# Patient Record
Sex: MALE | Race: WHITE
[De-identification: names, ages, dates, MRNs, and addresses within clinical notes are randomized per-mention and may not be internally consistent; named-entity substitution may affect disease eponyms.]

---

## 2017-12-27 ENCOUNTER — HOSPITAL ENCOUNTER (INPATIENT)
Dept: HOSPITAL 74 - JER | Age: 82
LOS: 26 days | Discharge: HOSPICE | DRG: 871 | End: 2018-01-22
Attending: INTERNAL MEDICINE | Admitting: INTERNAL MEDICINE
Payer: COMMERCIAL

## 2017-12-27 VITALS — BODY MASS INDEX: 28.2 KG/M2

## 2017-12-27 DIAGNOSIS — M54.9: ICD-10-CM

## 2017-12-27 DIAGNOSIS — J96.22: ICD-10-CM

## 2017-12-27 DIAGNOSIS — D72.829: ICD-10-CM

## 2017-12-27 DIAGNOSIS — E87.6: ICD-10-CM

## 2017-12-27 DIAGNOSIS — E83.39: ICD-10-CM

## 2017-12-27 DIAGNOSIS — J44.1: ICD-10-CM

## 2017-12-27 DIAGNOSIS — K27.9: ICD-10-CM

## 2017-12-27 DIAGNOSIS — R13.10: ICD-10-CM

## 2017-12-27 DIAGNOSIS — J47.1: ICD-10-CM

## 2017-12-27 DIAGNOSIS — E87.70: ICD-10-CM

## 2017-12-27 DIAGNOSIS — R10.9: ICD-10-CM

## 2017-12-27 DIAGNOSIS — I10: ICD-10-CM

## 2017-12-27 DIAGNOSIS — J96.21: ICD-10-CM

## 2017-12-27 DIAGNOSIS — A41.9: Primary | ICD-10-CM

## 2017-12-27 DIAGNOSIS — J69.0: ICD-10-CM

## 2017-12-27 LAB
ALBUMIN SERPL-MCNC: 2.6 G/DL (ref 3.4–5)
ALP SERPL-CCNC: 96 U/L (ref 45–117)
ALT SERPL-CCNC: 12 U/L (ref 12–78)
ANION GAP SERPL CALC-SCNC: 7 MMOL/L (ref 8–16)
APPEARANCE UR: (no result)
APTT BLD: 40.1 SECONDS (ref 26.9–34.4)
ARTERIAL BLOOD GAS PCO2: 55.6 MMHG (ref 35–45)
ARTERIAL PATENCY WRIST A: (no result)
AST SERPL-CCNC: 14 U/L (ref 15–37)
BACTERIA #/AREA URNS HPF: (no result) /HPF
BASE EXCESS BLDA CALC-SCNC: 7.5 MEQ/L (ref -2–2)
BASOPHILS # BLD: 0.1 % (ref 0–2)
BILIRUB SERPL-MCNC: 0.4 MG/DL (ref 0.2–1)
BILIRUB UR STRIP.AUTO-MCNC: NEGATIVE MG/DL
BUN SERPL-MCNC: 13 MG/DL (ref 7–18)
CALCIUM SERPL-MCNC: 8.8 MG/DL (ref 8.5–10.1)
CHLORIDE SERPL-SCNC: 100 MMOL/L (ref 98–107)
CO2 SERPL-SCNC: 34 MMOL/L (ref 21–32)
COHGB MFR BLD: 1.4 GM% (ref 0.5–2)
COLOR UR: YELLOW
CREAT SERPL-MCNC: 0.4 MG/DL (ref 0.7–1.3)
DEPRECATED RDW RBC AUTO: 16.5 % (ref 11.9–15.9)
EOSINOPHIL # BLD: 0 % (ref 0–4.5)
EPITH CASTS URNS QL MICRO: (no result) /HPF
GLUCOSE SERPL-MCNC: 129 MG/DL (ref 74–106)
HCT VFR BLD CALC: 39.6 % (ref 35.4–49)
HGB BLD-MCNC: 11.7 GM/DL (ref 11.7–16.9)
INR BLD: 1.12 (ref 0.82–1.09)
KETONES UR QL STRIP: NEGATIVE
LEUKOCYTE ESTERASE UR QL STRIP.AUTO: NEGATIVE
LYMPHOCYTES # BLD: 5.7 % (ref 8–40)
MCH RBC QN AUTO: 28.3 PG (ref 25.7–33.7)
MCHC RBC AUTO-ENTMCNC: 29.5 G/DL (ref 32–35.9)
MCV RBC: 95.8 FL (ref 80–96)
MONOCYTES # BLD AUTO: 9.2 % (ref 3.8–10.2)
MUCOUS THREADS URNS QL MICRO: (no result)
NEUTROPHILS # BLD: 85 % (ref 42.8–82.8)
NITRITE UR QL STRIP: NEGATIVE
PH UR: 5 [PH] (ref 5–8)
PLATELET BLD QL SMEAR: (no result)
PMV BLD: 8.5 FL (ref 7.5–11.1)
PO2 BLDA: 63.3 MMHG (ref 68–100)
POTASSIUM SERPLBLD-SCNC: 3.6 MMOL/L (ref 3.5–5.1)
PROT SERPL-MCNC: 6.5 G/DL (ref 6.4–8.2)
PROT UR QL STRIP: (no result)
PROT UR QL STRIP: NEGATIVE
PT PNL PPP: 12.7 SEC (ref 9.98–11.88)
RBC # BLD AUTO: 4.13 M/MM3 (ref 4–5.6)
RBC # UR STRIP: NEGATIVE /UL
SAO2 % BLDA: 87.5 % (ref 90–98.9)
SODIUM SERPL-SCNC: 141 MMOL/L (ref 136–145)
SP GR UR: 1.03 (ref 1–1.03)
UROBILINOGEN UR STRIP-MCNC: NEGATIVE MG/DL (ref 0.2–1)
WBC # BLD AUTO: 12.8 K/MM3 (ref 4–10)

## 2017-12-27 PROCEDURE — G0480 DRUG TEST DEF 1-7 CLASSES: HCPCS

## 2017-12-27 RX ADMIN — METHYLPREDNISOLONE SODIUM SUCCINATE SCH MG: 40 INJECTION, POWDER, FOR SOLUTION INTRAMUSCULAR; INTRAVENOUS at 14:12

## 2017-12-27 RX ADMIN — IPRATROPIUM BROMIDE AND ALBUTEROL SULFATE SCH AMP: .5; 3 SOLUTION RESPIRATORY (INHALATION) at 17:53

## 2017-12-27 RX ADMIN — MUPIROCIN SCH APPLIC: 20 OINTMENT TOPICAL at 21:17

## 2017-12-27 RX ADMIN — PANTOPRAZOLE SODIUM SCH MG: 40 INJECTION, POWDER, FOR SOLUTION INTRAVENOUS at 15:56

## 2017-12-27 RX ADMIN — PIPERACILLIN SODIUM,TAZOBACTAM SODIUM SCH MLS/HR: 3; .375 INJECTION, POWDER, FOR SOLUTION INTRAVENOUS at 17:31

## 2017-12-27 RX ADMIN — HEPARIN SODIUM SCH UNIT: 5000 INJECTION, SOLUTION INTRAVENOUS; SUBCUTANEOUS at 15:56

## 2017-12-27 RX ADMIN — VANCOMYCIN HYDROCHLORIDE SCH MLS/HR: 1 INJECTION, POWDER, LYOPHILIZED, FOR SOLUTION INTRAVENOUS at 21:17

## 2017-12-27 RX ADMIN — HEPARIN SODIUM SCH UNIT: 5000 INJECTION, SOLUTION INTRAVENOUS; SUBCUTANEOUS at 21:17

## 2017-12-27 NOTE — CONSULT
Consult


Consult Specialty:: Pulmonary Critical Care 





- History of Present Illness


Chief Complaint: SOB


History of Present Illness: 





Pt is a 87 yo male with h/o COPD, HTN, diverticulosis, who was recently 

hospitalized (11/14-12/6) for GIB 2/2 duodenal ulcer with course at the time c/

b PNA. He has completed a course of Levaquin and was discharged to NH. Today 

EMS activated for SOB x 2 days,  and pt was brought to ED for evaluation. 

Admission labs notable for WBC 12, Trop 0.18. EKG with no ischemic changes. Pt 

placed on BiPAP with improvement in his respiratory status and SpO2. Pt was 

started on nebs, methylpred, zosyn/vanco and was transferred to the ICU for 

further management. On arrival to the ICU BiPAP changed to HFNC. 








 Current Medications





Acetaminophen (Ofirmev Injection -)  1,000 mg IVPB Q6H PRN


   PRN Reason: FEVER OR PAIN


Albuterol Sulfate (Ventolin 0.083% Nebulizer Soln -)  1 amp NEB Q4H PRN


   PRN Reason: SHORT OF BREATH/WHEEZING


Albuterol/Ipratropium (Duoneb -)  1 amp NEB QIDR [facility]


   Last Admin: 12/27/17 17:53 Dose:  1 amp


Chlorhexidine Gluconate (Hibiclens For Decolonization -)  1 applic TP HS [facility]


Heparin Sodium (Porcine) (Heparin -)  5,000 unit SQ TID [facility]


   Last Admin: 12/27/17 15:56 Dose:  5,000 unit


Dextrose/Sodium Chloride (D5-Ns -)  1,000 mls @ 75 mls/hr IV ASDIR [facility]


   Last Admin: 12/27/17 15:52 Dose:  75 mls/hr


Vancomycin HCl 1,000 mg/ (Dextrose)  250 mls @ 250 mls/hr IVPB BID [facility]


   PRN Reason: Protocol


Piperacillin Sod/Tazobactam (Sod 3.375 gm/ Dextrose)  100 mls @ 200 mls/hr IVPB 

Q8H-IV [facility]


   Last Admin: 12/27/17 17:31 Dose:  200 mls/hr


Methylprednisolone Sodium Succinate (Solu-Medrol -)  40 mg IVPUSH DAILY [facility]


   Last Admin: 12/27/17 14:12 Dose:  40 mg


Mupirocin (Bactroban Ointment (For Decolonization) -)  1 applic NS BID [facility]


   Stop: 01/01/18 21:59


Pantoprazole Sodium (Protonix Iv)  40 mg IVPUSH DAILY [facility]


   Last Admin: 12/27/17 15:56 Dose:  40 mg





 





- Past Medical History


CNS: No: Alzheimer's


Cardio/Vascular: No: AFIB


Pulmonary: Yes: COPD, O2 Dependent, Pneumonia


Gastrointestinal: Yes: Diverticulosis, GI Bleed, Peptic Ulcer Disease, Other (

GASTRIC OUTLET OBSTRUCTION)


Hepatobiliary: No: Cirrhosis


Renal/: No: Renal Failure


Infectious Disease: No: AIDS


Psych: No: Addictions





- Past Surgical History


Additional Surgical History: UNABLE TO OBTAIN ACCURATE INFO/BACK SURG MULTIPLE





- Alcohol/Substance Use


Hx Alcohol Use: No


History of Substance Use: reports: None





- Smoking History


Smoking history: Unknown if ever smoked





- Social History


Usual Living Arrangement: Nursing Home


ADL: Support Services


History of Recent Travel: No





Home Medications





- Allergies


Allergies/Adverse Reactions: 


 Allergies











Allergy/AdvReac Type Severity Reaction Status Date / Time


 


No Known Allergies Allergy   Verified 12/27/17 08:24














- Home Medications


Home Medications: 


Ambulatory Orders





Acetaminophen [Tylenol] 2 tab PO Q6H PRN 12/27/17 


Albuterol 2.5/Ipratropium 0.5 [Duoneb -] 1 amp NEB TID 12/27/17 


Budesonide [Pulmicort 0.5 mg Nebulizer -] 1 neb PO BID 12/27/17 


Ipratropium 0.02% Nebulizer [Atrovent 0.02% Nebulizer -] 1 amp NEB Q6H PRN 12/27 /17 


Menthol/Phenol [Cepastat Lozenge -] 1 each MM QID PRN 12/27/17 


Metoclopramide HCl 10 mg PO TID 12/27/17 


Prednisone 10 mg PO BID 12/27/17 


Tramadol HCl [Ultram] 2 mg PO DAILY 12/27/17 











Physical Exam


Vital Signs: 


 Vital Signs











Temperature  97.5 F L  12/27/17 16:00


 


Pulse Rate  75   12/27/17 16:40


 


Respiratory Rate  19   12/27/17 19:28


 


Blood Pressure  112/61   12/27/17 16:40


 


O2 Sat by Pulse Oximetry (%)  93 L  12/27/17 19:28











Constitutional: Yes: No Distress


Cardiovascular: Yes: Regular Rate and Rhythm, S1, S2


Respiratory: Yes: Regular, On Nasal O2, Other (crackles at bases)


Gastrointestinal: Yes: Normal Bowel Sounds, Soft.  No: Tenderness


Edema: No


Labs: 


 CBC, BMP





 12/27/17 09:15 





 12/27/17 09:15 





 CBCD











WBC  12.8 K/mm3 (4.0-10.0)  H  12/27/17  09:15    


 


RBC  4.13 M/mm3 (4.00-5.60)   12/27/17  09:15    


 


Hgb  11.7 GM/dL (11.7-16.9)   12/27/17  09:15    


 


Hct  39.6 % (35.4-49)   12/27/17  09:15    


 


MCV  95.8 fl (80-96)   12/27/17  09:15    


 


MCHC  29.5 g/dl (32.0-35.9)  L  12/27/17  09:15    


 


RDW  16.5 % (11.9-15.9)  H  12/27/17  09:15    


 


Plt Count  No Result Required.   12/27/17  09:15    


 


MPV  8.5 fl (7.5-11.1)   12/27/17  09:15    








 CMP











Sodium  141 mmol/L (136-145)   12/27/17  09:15    


 


Potassium  3.6 mmol/L (3.5-5.1)   12/27/17  09:15    


 


Chloride  100 mmol/L ()   12/27/17  09:15    


 


Carbon Dioxide  34 mmol/L (21-32)  H  12/27/17  09:15    


 


Anion Gap  7  (8-16)  L  12/27/17  09:15    


 


BUN  13 mg/dL (7-18)   12/27/17  09:15    


 


Creatinine  0.4 mg/dL (0.7-1.3)  L  12/27/17  09:15    


 


Creat Clearance w eGFR  > 60  (>60)   12/27/17  09:15    


 


Calcium  8.8 mg/dL (8.5-10.1)   12/27/17  09:15    


 


Total Bilirubin  0.4 mg/dL (0.2-1.0)   12/27/17  09:15    


 


AST  14 U/L (15-37)  L  12/27/17  09:15    


 


ALT  12 U/L (12-78)   12/27/17  09:15    


 


Alkaline Phosphatase  96 U/L ()   12/27/17  09:15    


 


Total Protein  6.5 g/dl (6.4-8.2)   12/27/17  09:15    


 


Albumin  2.6 g/dl (3.4-5.0)  L  12/27/17  09:15    














Imaging





- Results


Chest X-ray: Report Reviewed





Problem List





- Problems


(1) COPD (chronic obstructive pulmonary disease)


Code(s): J44.9 - CHRONIC OBSTRUCTIVE PULMONARY DISEASE, UNSPECIFIED   


Qualifiers: 


   COPD type: COPD with acute lower respiratory infection   Qualified Code(s): 

J44.0 - Chronic obstructive pulmonary disease with acute lower respiratory 

infection   





(2) Hypertension


Code(s): I10 - ESSENTIAL (PRIMARY) HYPERTENSION   





(3) Pneumonia


Code(s): J18.9 - PNEUMONIA, UNSPECIFIED ORGANISM   


Qualifiers: 


   Pneumonia type: due to unspecified organism   Laterality: left   Lung 

location: lower lobe of lung   Qualified Code(s): J18.1 - Lobar pneumonia, 

unspecified organism   





(4) Respiratory failure


Code(s): J96.90 - RESPIRATORY FAILURE, UNSP, UNSP W HYPOXIA OR HYPERCAPNIA   





Assessment/Plan





Hypoxemic respiratory failure, PNA (bacterial or viral) vs fluid overload vs 

COPD exacerbation 


Troponin leak likely demand ischemia 


HTN


Recent GIB, now with stable Hgb





-f/u respiratory swab


-send sputum if able


-cont Zosyn/Vanco


-cont nebs


-cont methylpred 


-cont HFNC --> wean flow and FiO2 for SpO2 88-94% (currently at 50%/60L)


-TTE 


-stop standing fluids


-consider diuresis if depressed EF 


-trend troponin 


-not hypertensive now, monitor 


-LE dopplers


-heparin SubQ for ppx 


-protonix for GI ppx (recent GIB)


-DNR/DNI/no invasive procedures as per prior GOC conversations





LUIS FELIPE Christianson 


Critical Care time: 35 min

## 2017-12-27 NOTE — CON.PULM
Consult


Consult Specialty:: PULMONARY


Referred by:: DONTE


Reason for Consultation:: RESP FAILURE





- History of Present Illness


Chief Complaint: SOB/COUGH/FEVER


History of Present Illness: 





86 W MALE ADMITTED FROM [facility] FOUND TO BE SOB/WITH O2 DESAT, BIBA.


NOW WITH NIPPV AND IS BEING SEEN IN ER  PT HAS H/O RECENT [facility] ADMISSION FOR 

ACUTE DIVERTICULITIS/PUD WITH HEMORRHAGE


AND POSSIBLE PNEUMONIA. THESE EVENTS ARE OBTAINED FROM THE CHART. 


PATIENT HAS H/O COPD ON O2 AT NIGHT/OA/B/L ILIAC ART ANEURYSMS.





- History Source


History Provided By: Family Member, Medical Record


Limitations to Obtaining History: Clinical Condition





- Past Medical History


CNS: No: Alzheimer's


Cardio/Vascular: No: AFIB


Pulmonary: Yes: COPD, O2 Dependent, Pneumonia


Gastrointestinal: Yes: Diverticulosis, GI Bleed, Peptic Ulcer Disease, Other (

GASTRIC OUTLET OBSTRUCTION)


Hepatobiliary: No: Cirrhosis


Renal/: No: Renal Failure


Heme/Onc: Yes: Anemia


Infectious Disease: No: AIDS


Psych: No: Addictions





- Past Surgical History


Additional Surgical History: UNABLE TO OBTAIN ACCURATE INFO/BACK SURG MULTIPLE





- Alcohol/Substance Use


Hx Alcohol Use: No


History of Substance Use: reports: None





- Smoking History


Smoking history: Unknown if ever smoked





- Social History


Usual Living Arrangement: Nursing Home


ADL: Support Services


Place of Birth: [place]


History of Recent Travel: No





Home Medications





- Allergies


Allergies/Adverse Reactions: 


 Allergies











Allergy/AdvReac Type Severity Reaction Status Date / Time


 


No Known Allergies Allergy   Verified 12/27/17 08:24














- Home Medications


Home Medications: 


Ambulatory Orders





Albuterol 2.5/Ipratropium 0.5 [Duoneb -] 1 amp NEB TID 12/27/17 


Budesonide [Pulmicort Flexhaler] 90 mcg IH BID 12/27/17 


Ipratropium 0.02% Nebulizer [Atrovent 0.02% Nebulizer -] 1 amp NEB Q6H PRN 12/27 /17 


Metoclopramide HCl 10 mg PO TID 12/27/17 


Prednisone 10 mg PO BID 12/27/17 


Tramadol HCl [Ultram] 50 mg PO DAILY 12/27/17 











Family Disease History





- Family Disease History


Family History: Unable to Obtain





Review of Systems


Unable to obtain ROS, reason: UNABLE





Physical Exam


Vital Sings: 


 Vital Signs











Temperature  99 F   12/27/17 14:13


 


Pulse Rate  96 H  12/27/17 12:59


 


Respiratory Rate  24   12/27/17 12:59


 


Blood Pressure  125/78   12/27/17 12:59


 


O2 Sat by Pulse Oximetry (%)  86 L  12/27/17 12:59











Constitutional: Yes: Moderate Distress


Eyes: Yes: EOM Intact


HENT: Yes: Normocephalic


Neck: Yes: Other (NIPPV MASK)


Cardiovascular: Yes: Tachycardia, S1, S2


Respiratory: Yes: Diminished, Rhonchi (SCATTERED B/L)


Gastrointestinal: Yes: Normal Bowel Sounds, Soft


Renal/: Yes: WNL


Extremities: Yes: Other (HEEL OFFLOADERS)


Labs: 


 CBC, BMP





 12/27/17 09:15 





 12/27/17 09:15 





 ABG Results











ABG pH  7.40  (7.35-7.45)   12/27/17  10:25    


 


ABG pCO2 at Pt Temp  55.6 mmHg (35-45)  H  12/27/17  10:25    


 


ABG pO2 at Pt Temp  63.3 mmHg ()  L  12/27/17  10:25    


 


ABG HCO3  33.4 meq/L (22-26)  H  12/27/17  10:25    


 


ABG O2 Sat (Measured)  87.5 % (90-98.9)  L  12/27/17  10:25    


 


ABG O2 Content  13.2 % vol (15-22)  L  12/27/17  10:25    


 


ABG Base Excess  7.5 meq/l (-2-2)  H  12/27/17  10:25    








REST REVIEWED





Imaging





- Results


Chest X-ray: Report Reviewed, Image Reviewed


X-ray: Report Reviewed, Image Reviewed





Problem List





- Problems


(1) COPD (chronic obstructive pulmonary disease)


Code(s): J44.9 - CHRONIC OBSTRUCTIVE PULMONARY DISEASE, UNSPECIFIED   


Qualifiers: 


   COPD type: COPD with acute lower respiratory infection   Qualified Code(s): 

J44.0 - Chronic obstructive pulmonary disease with acute lower respiratory 

infection   





(2) Pneumonia


Code(s): J18.9 - PNEUMONIA, UNSPECIFIED ORGANISM   


Qualifiers: 


   Pneumonia type: due to unspecified organism   Laterality: left   Lung 

location: lower lobe of lung   Qualified Code(s): J18.1 - Lobar pneumonia, 

unspecified organism   





(3) Anemia


Code(s): D64.9 - ANEMIA, UNSPECIFIED   





(4) Sepsis


Code(s): A41.9 - SEPSIS, UNSPECIFIED ORGANISM   


Qualifiers: 


   Sepsis type: sepsis due to unspecified organism   Qualified Code(s): A41.9 - 

Sepsis, unspecified organism   





(5) Hypertension


Code(s): I10 - ESSENTIAL (PRIMARY) HYPERTENSION   





(6) Chronic steroid use


Code(s): COA5812 -    





Assessment/Plan





HYPOXEMIC HYPERCAPNEIC RESP FAILURE LIKELY MULTI-FACTORIAL


R/O PNEUMONIA (NO PREVIOUS XRAYS FOR COMPARISON)


MULTIPLE CO-MORBID CONDITIONS AS LISTED IN HX


FAILURE TO THRIVE





WOULD OBTAIN RECORDS FROM RECENT ADMISSION TO [facility] AS NOT TO 

REPEAT TESTING


PANCULTURE/BLOOD/SPUTUM/URINE


CONTINUE NIPPV WITH O2 TO KEEP SAT GREATER THAN 90%


EMPIRIC ANTIBIOTICS TO COVER FACILITY ACQUIRED PATHOGENS


INFLU SWAB/URINARY ANTIGENS


CYCLE TROPONINS/SERIAL EKG'S/TELEMETRY MONITORING/CARDIAC CONSULT


CHECK ECHO/BNP


FOLLOW CXR





BRIAN TRAN MD

## 2017-12-27 NOTE — PDOC
Attending Attestation





- HPI


HPI: 








12/27/17 10:41


The patient is a 86 year old male, with a significant past medical history of 

COPD, pneumonia (bilaterally, with recent hospitalization at [facility] 2 weeks ago, 

treated with Levaquin), hypertension, diverticulosis, and osteoarthritis, who 

presents to the emergency department [facility] from [facility] with shortness of 

breath for approximately 2 days. Per EMS patients O2 sat was found to be in the 

low 30s earlier this morning. EMS reports patient was given a Duoneb treatment 

with minimal relief of symptoms. However, patients O2 sat went up to 88, which 

is part of his baseline. Patient reports associated diaphoresis and 

lightheadedness, but denies any chest pain or palpitations. He denies any 

recent fever, chills, or headache. He denies any abdominal pain, nausea, or 

vomiting. Patients history is limited, as he only responds to yes or no 

questions due to CPap in place.





Allergies: NKDA


Past Surgical History: None reported








- Physicial Exam


PE: 





12/27/17 10:41


GENERAL: The patient is awake, alert, and fully oriented, Nontoxic - in no 

acute distress.


HEAD: Normocephalic, atraumatic.


EYES: extraocular movements intact, sclera anicteric, conjunctiva clear.


ENT: + Mildly dry mucous membranes. Normal voice.


NECK: Normal range of motion, supple


LUNGS: Breath sounds equal, clear to auscultation bilaterally. No wheezes, no 

rhonchi, no rales.


HEART: Regular rate and rhythm, without murmur, rub or gallop.


ABDOMEN: Soft, nontender, normoactive bowel sounds. No guarding, no rebound.No 

CVA tenderness


EXTREMITIES: Normal range of motion, no edema. No clubbing or cyanosis. No cords

, erythema, or tenderness.


NEUROLOGICAL: No facial asymmetry, Normal speech,


PSYCH: Normal mood, normal affect.


SKIN: Warm, Dry, normal turgor.


Back: No midline tenderness to the cervical, thoracic or lumbar spine


Musculoskelatal: FROM of b/l shoulders, elbows, wrist. FROM of hips, knees, 

ankles - No signs of ecchymosis, erythema, or crepitus noted on palpation 

extremities, chest wall, clavicals, ribs, back.





- Medical Decision Making





12/27/17 10:41








Documentation prepared by Zena Bowden, acting as medical scribe for Omid Miller MD.





<Zena Bowden - Last Filed: 12/27/17 10:41>





- Resident


Resident Name: Vlad Camara





- ED Attending Attestation


I have performed the following: I have examined & evaluated the patient, The 

case was reviewed & discussed with the resident, I agree w/resident's findings 

& plan, Exceptions are as noted





- HPI


HPI: 





12/27/17 10:24


86y M hx of anxiety, osteo, htn, copd on3 l of o2, recent admission for b/l pna 

in early dec sent from [facility] on [address] for evaluation of sob, pt notes he was 

feeling ok yesterday, this morning started feeling sob. pt notes he felt sweaty 

yetserday, but denies any fever/chills, cp, n/v, palptitations, abd pain, 

diarrhea.  


o2 sat in the 80s per EMS, started on cpap in the field with mild improvement





on arrival the pt was started on bipap


noted hypoxic to 80 on RA, improvement with bipap to 90s


with improvement of respiratory effort.


pts pulm exam revealed deiminshed breath sounds at the bases





differential ncludes pna, chf, copd exacerbation


sepsis orderset initiated


continue bipap


anticipate admission for further managment











- Medical Decision Making








12/27/17 11:39





CRITICAL CARE DOCUMENTATION:





I spent ~35 minutes of Critical Care time, excluding separately billable 

procedures, involving high complexity decision making to assess, manipulate and 

support vital system function(s) to treat single or multiple vital organ system 

failure and/or to prevent further life threatening deterioration of the patient'

s condition.





12/27/17 11:51


case dw Dr. Ryder - agree with admission under dr. gupta service





Case discussed in detail with admitting physician including history, physical 

exam and ancillary studies.


Admitting physician has assumed care for the patient, will follow all pending 

diagnostics and will complete the evaluation and treatment. 








<Omid Miller - Last Filed: 12/27/17 11:51>





**Heart Score/ECG Review





- ECG Impressions


Comment:: 





12/27/17 10:35


Twelve-lead EKG was performed and reviewed by me. 


There is normal sinus rhythm 


Right bundle-branch block 


No ST changes suggestive of acute ischemia 








<Omid Miller - Last Filed: 12/27/17 11:51>

## 2017-12-27 NOTE — CONS
INFECTIOUS DISEASE CONSULTATION

 

DATE OF CONSULTATION:  12/27/2017

 

REQUESTING PROVIDER:  The hospitalist service.

 

HISTORY OF PRESENT ILLNESS:  This is an 86-year-old man who was admitted for rehab

following a hospitalization at [facility] from November 14 to December 6.  He had more diverticulosis, a GI bleed, was found to have, on

endoscopy, 3 ulcers in his duodenum.  He was found as well to have bilateral

pneumonia and was treated with a course of Levaquin, apparently which he finished on

the 20th of December.  This morning, he was noted to have respiratory distress.  He

was given 2 nebulizers at the nursing home with no improvement.  He was hypoxic, he

had a fever of 101.1, and he was transferred to the hospital.  He was given

vancomycin and Zosyn in the ER.  His respiratory status improved in the emergency

room.  He is DNR/DNI.  He is currently in the ICU on high-flow oxygen.

 

PAST MEDICAL HISTORY:  He has adjustment disorder.  He has difficulty walking.  He

has primary osteoarthritis.  He has a history of pneumonia; pressure ulcer of the

right hip, stage 2; pressure ulcer of the left hip, stage 2; COPD; rhabdomyolysis;

hypertension; BPH; and he has a history of dysphagia, hypoxemia.

 

ALLERGIES:  He has no known drug allergies.

 

MEDICATIONS:  At the nursing home include Tylenol, Reglan, prednisone, Pulmicort,

Cepastat throat lozenges, DuoNebs, and tramadol.

 

SURGICAL HISTORY:  Not available.

 

SOCIAL HISTORY:  As well is not available.  He is currently residing at the nursing

home.

 

REVIEW OF SYSTEMS:  As per the nursing home note.

 

PHYSICAL EXAMINATION:

General:  He is currently on high-flow oxygen, resting comfortably.  He has been

transferred to the ICU.

Vital Signs:  Temperature is 98.3.  T-max was 100.8, was 101.1 this morning at the

nursing home.  Pulse is 83, blood pressure 123/73, respiratory rate is 26, saturating

87%.

HEENT:  He is normocephalic.  His eyes are anicteric.  He has no thrush.

Neck:  Supple.

Lungs:  Diminished breath sounds at the bases.

Heart:  Regular rate and rhythm.

Abdomen:  Soft, nontender.

Extremities:  Without edema.

 

DIAGNOSTIC DATA:  His white count is 12.8, hemoglobin 11.7, platelet count is

pending.  His INR is 1.1.  BUN is 13 and creatinine 0.4.  Blood cultures are pending.

 

Chest x-ray:  He has a right effusion, with congestive changes with questionable

superimposed infiltrate.

 

In summary, this is an elderly man recently hospitalized with impending respiratory

failure, pneumonia (hospital-acquired), and chronic obstructive pulmonary disease. 

Given the recent Levaquin use, vancomycin and Zosyn appear to be appropriate.  Would

follow up his cultures, obtain an influenza screen, and check legionella and

pneumococcal antigens.  Further recommendations to follow based on his clinical

course.

 

 

CAREY JUSTICE M.D.

 

[id number]

DD: 12/27/2017 16:41

DT: 12/27/2017 17:29

Job #:  [id number]

## 2017-12-27 NOTE — PDOC
History of Present Illness





- General


History Source: Patient, Nursing Home Records


Exam Limitations: No Limitations





- History of Present Illness


Initial Comments: 





12/27/17 09:32


Patient is an 86M with history of recent inpatient admission to [facility], left 

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 

head avascular necrosis, duodenal ulcers, gastrict obstruction, COPD, 

osteoarthritis, pressure ulcers in heels and HTN here today with respiratory 

distress. EMS reports patient was found in respiratory distress and was given 3 

rounds of duonebs. Patient denies fevers. Endorses cough, chest pain, shortness 

of breath. States that he just doesn't feel good. Patient was admitted to [facility] 

from 11/14-12/6 for sigmoid diverticulosis. Patient finished levaquin 7 day 

course on 12/20 for bilateral pneumoina. 





<Vlad Camara - Last Filed: 12/27/17 11:39>





<Omid Miller - Last Filed: 12/27/17 11:50>





- General


Chief Complaint: Shortness of Breath


Stated Complaint: SOB


Time Seen by Provider: 12/27/17 08:25





Past History





<Vlad Camara - Last Filed: 12/27/17 11:39>





<Omid Miller - Last Filed: 12/27/17 11:50>





- Past Medical History


Allergies/Adverse Reactions: 


 Allergies











Allergy/AdvReac Type Severity Reaction Status Date / Time


 


No Known Allergies Allergy   Verified 12/27/17 08:24











Home Medications: 


Ambulatory Orders





Albuterol 2.5/Ipratropium 0.5 [Duoneb -] 1 amp NEB TID 12/27/17 


Budesonide [Pulmicort Flexhaler] 90 mcg IH BID 12/27/17 


Ipratropium 0.02% Nebulizer [Atrovent 0.02% Nebulizer -] 1 amp NEB Q6H PRN 12/27 /17 


Metoclopramide HCl 10 mg PO TID 12/27/17 


Prednisone 10 mg PO BID 12/27/17 


Tramadol HCl [Ultram] 50 mg PO DAILY 12/27/17 











**Review of Systems





- Review of Systems


Comments:: 





12/27/17 09:48


GENERAL/CONSTITUTIONAL: No fever or chills.


HEAD, EYES, EARS, NOSE AND THROAT: No change in vision. Positive for sore 

throat.


CARDIOVASCULAR: Positive for chest pain and shortness of breath.


RESPIRATORY: Positive for cough and wheezing.


GASTROINTESTINAL: No nausea, vomiting, diarrhea or constipation.


GENITOURINARY: No dysuria, frequency, or change in urination.


MUSCULOSKELETAL: Positive for arthritis issues, chronic hip/back pain.


SKIN: No rash


NEUROLOGIC: No headache, vertigo, loss of consciousness, or change in strength/

sensation.


HEMATOLOGIC/LYMPHATIC: No anemia, easy bleeding, or history of blood clots.








<Vlad Camara - Last Filed: 12/27/17 11:39>





*Physical Exam





- Vital Signs


 Last Vital Signs











Temp Pulse Resp BP Pulse Ox


 


             91 L


 


             12/27/17 08:35














- Physical Exam


Comments: 





12/27/17 09:57


GENERAL: Awake, alert, and fully oriented, on bipap, ill appearing


HEAD: No signs of trauma, normocephalic, atraumatic


EYES: PERRLA, EOMI, sclera anicteric, conjunctiva clear


ENT: Auricles normal inspection, hearing grossly normal, nares patent, 

oropharynx clear without


exudates. Dry mucosa


NECK: Normal ROM, supple, no lymphadenopathy, JVD, or masses


LUNGS: On bipap, shaking head yes/no, not speaking sentences, equal breath 

rounds, coarse breath sounds


HEART: Regular rate, irregular rhythm, normal S1 and S2, no murmurs, rubs or 

gallops, peripheral pulses normal and equal bilaterally.


ABDOMEN: Soft, nontender, normoactive bowel sounds.  No guarding, no rebound.  

No masses


EXTREMITIES: Normal inspection, Normal range of motion, no edema.  No clubbing 

or cyanosis.


NEUROLOGICAL: Cranial nerves II through XII grossly intact.  Normal speech, no 

focal sensorimotor deficits


SKIN: Warm, Dry, normal turgor, in ulna boots, sacral ulcer








<Vlad Camara - Last Filed: 12/27/17 11:39>





- Vital Signs


 Last Vital Signs











Temp Pulse Resp BP Pulse Ox


 


 100.8 F H  78   22   113/71   86 L


 


 12/27/17 09:46  12/27/17 11:13  12/27/17 11:13  12/27/17 11:13  12/27/17 11:13














<Omid Miller - Last Filed: 12/27/17 11:50>





ED Treatment Course





- LABORATORY


CBC & Chemistry Diagram: 


 12/27/17 09:15





 12/27/17 09:15





<HoaVlad - Last Filed: 12/27/17 11:39>





- LABORATORY


CBC & Chemistry Diagram: 


 12/27/17 09:15





 12/27/17 09:15





- ADDITIONAL ORDERS


Additional order review: 


 Laboratory  Results











  12/27/17 12/27/17 12/27/17





  10:25 09:15 09:15


 


PT with INR   


 


INR   


 


PTT (Actin FS)   


 


Puncture Site  Right brachial  


 


ABG pH  7.40  


 


ABG pCO2 at Pt Temp  55.6 H  


 


ABG pO2 at Pt Temp  63.3 L  


 


ABG HCO3  33.4 H  


 


ABG O2 Sat (Measured)  87.5 L  


 


ABG O2 Content  13.2 L  


 


ABG Base Excess  7.5 H  


 


Jeffy Test  Not applicable  


 


VBG pH   


 


POC VBG pCO2   


 


POC VBG pO2   


 


Mixed VBG HCO3   


 


Carboxyhemoglobin  1.4  


 


Methemoglobin  1.1  


 


O2 Delivery Device  Bipap  


 


Oxygen Flow Rate  50  


 


Vent Mode  S/t 12/6  


 


Vent Rate  10  


 


Mechanical Rate  Yes  


 


Sodium   


 


Potassium   


 


Chloride   


 


Carbon Dioxide   


 


Anion Gap   


 


BUN   


 


Creatinine   


 


Creat Clearance w eGFR   


 


Random Glucose   


 


Lactic Acid    1.5


 


Calcium   


 


Total Bilirubin   


 


AST   


 


ALT   


 


Alkaline Phosphatase   


 


Creatine Kinase   


 


Troponin I   


 


Total Protein   


 


Albumin   


 


Blood Type   A POSITIVE 


 


Antibody Screen   Negative 














  12/27/17 12/27/17 12/27/17





  09:15 09:15 09:15


 


PT with INR    12.70 H


 


INR    1.12


 


PTT (Actin FS)    40.1 H


 


Puncture Site   


 


ABG pH   


 


ABG pCO2 at Pt Temp   


 


ABG pO2 at Pt Temp   


 


ABG HCO3   


 


ABG O2 Sat (Measured)   


 


ABG O2 Content   


 


ABG Base Excess   


 


Jeffy Test   


 


VBG pH   No Result Required. 


 


POC VBG pCO2   No Result Required. 


 


POC VBG pO2   No Result Required. 


 


Mixed VBG HCO3   No Result Required. 


 


Carboxyhemoglobin   


 


Methemoglobin   


 


O2 Delivery Device   


 


Oxygen Flow Rate   


 


Vent Mode   


 


Vent Rate   


 


Mechanical Rate   


 


Sodium  141  


 


Potassium  3.6  


 


Chloride  100  


 


Carbon Dioxide  34 H  


 


Anion Gap  7 L  


 


BUN  13  


 


Creatinine  0.4 L  


 


Creat Clearance w eGFR  > 60  


 


Random Glucose  129 H  


 


Lactic Acid   


 


Calcium  8.8  


 


Total Bilirubin  0.4  


 


AST  14 L  


 


ALT  12  


 


Alkaline Phosphatase  96  


 


Creatine Kinase  36 L  


 


Troponin I  0.18 H  


 


Total Protein  6.5  


 


Albumin  2.6 L  


 


Blood Type   


 


Antibody Screen   








 











  12/27/17





  09:15


 


RBC  4.13


 


MCV  95.8


 


MCHC  29.5 L


 


RDW  16.5 H


 


MPV  8.5


 


Neutrophils %  85.0 H


 


Lymphocytes %  5.7 L


 


Monocytes %  9.2


 


Eosinophils %  0.0


 


Basophils %  0.1














- Medications


Given in the ED: 


ED Medications














Discontinued Medications














Generic Name Dose Route Start Last Admin





  Trade Name Mercedez  PRN Reason Stop Dose Admin


 


Acetaminophen  1,000 mg  12/27/17 09:56  12/27/17 10:15





  Ofirmev Injection -  IVPB  12/27/17 09:57  1,000 mg





  ONCE ONE   Administration


 


Sodium Chloride  1,000 mls @ 1,000 mls/hr  12/27/17 10:07  12/27/17 10:38





  Normal Saline -  IV  12/27/17 11:06  1,000 mls/hr





  ASDIR STA   Administration


 


Magnesium Sulfate  1 gm  12/27/17 08:39  12/27/17 09:35





  Magnesium Sulfate  IVPB  12/27/17 08:40  1 gm





  ONCE ONE   Administration


 


Piperacillin/Tazobactam/Dextrose  3.375 gm  12/27/17 10:15  12/27/17 10:38





  Zosyn 3.375gm Ivpb (Premix)  IVPB  12/27/17 10:16  3.375 gm





  ONCE ONE   Administration














<Omid Miller - Last Filed: 12/27/17 11:50>





Medical Decision Making





- Critical Care Time


Total Critical Care Time (minutes): 30


Critical Care Statement: The care of this patient involved high complexity 

decision making to prevent further life threatening deterioration of the patient

's condition and/or to evaluate & treat vital organ system(s) failure or risk 

of failure.





- Medical Decision Making





12/27/17 10:02


86M with history of recent inpatient admission to [facility], left common iliac and rt 

internal iliac arter aneurysm, chronic bilateral femoral head avascular necrosis

, duodenal ulcers, gastrict obstruction, COPD, osteoarthritis, pressure ulcers 

in heels and HTN here today with respiratory distress. Vital signs notable for 

fever, tachycardia, tachypnea. PE notable for coarse breath sounds and dry 

mucosa. Suspect the patient is septic secondary to pneumonia. Differential also 

includes: pulmonary edema 2/2 CHF, ACS, arrhythmia. Will draw septic workup, 

give 1L NS and vanc/zosyn.





EKG shows sinus tachy (rate 104) with RBBB. QTc 536. . No ST elevations/

depressions. No prior EKG available for comparison. Will give mag 1g for QTc 

prolongation.





12/27/17 10:14


CXR shows possible infiltrate and effusion. 


12/27/17 11:16


 Laboratory Tests











  12/27/17 12/27/17





  09:15 10:25


 


ABG pH   7.40


 


ABG pCO2 at Pt Temp   55.6 H


 


ABG pO2 at Pt Temp   63.3 L


 


ABG HCO3   33.4 H


 


BUN  13 


 


Creatinine  0.4 L 


 


Troponin I  0.18 H 








ABG shows carbon dioxide retention. Trop positive to .18, no pre-renal picture 

on labs. Pending CBC. Believe trop is secondary to demand 2/2 sepsis.


12/27/17 11:23 


 Laboratory Tests











  12/27/17





  09:15


 


WBC  12.8 H


 


Hgb  11.7


 


Hct  39.6





CBC shows leukocytosis, no anemia. Will admit to hospitalist to ICU for sepsis 2

/2 pneumonia.








<Vlad Camara - Last Filed: 12/27/17 11:39>





*DC/Admit/Observation/Transfer





<Vlad Camara - Last Filed: 12/27/17 11:39>





- Discharge Dispostion


Admit: Yes





<Omid Miller - Last Filed: 12/27/17 11:50>


Diagnosis at time of Disposition: 


COPD (chronic obstructive pulmonary disease)


Qualifiers:


 COPD type: COPD with acute lower respiratory infection Qualified Code(s): 

J44.0 - Chronic obstructive pulmonary disease with acute lower respiratory 

infection





Pneumonia


Qualifiers:


 Pneumonia type: due to unspecified organism Laterality: left Lung location: 

lower lobe of lung Qualified Code(s): J18.1 - Lobar pneumonia, unspecified 

organism





Sepsis


Qualifiers:


 Sepsis type: sepsis due to unspecified organism Qualified Code(s): A41.9 - 

Sepsis, unspecified organism








- Discharge Dispostion


Condition at time of disposition: Guarded





- Referrals


Referrals: 


Kostas Reeves MD [Primary Care Provider] - 





- Patient Instructions





- Post Discharge Activity

## 2017-12-27 NOTE — HP
CHIEF COMPLAINT:





PCP:





HISTORY OF PRESENT ILLNESS:


   HPI limited due to medical condition. Information obtained through HCP and 

chart.





87yo M with history of COPD (3LNC at night), HTN, OA, and PUD who was brought 

by ambulance from [facility] due to respiratory distress noted during the 

nursing home rounds this morning. Pt has a recent admission to [facility] 

for GI bleed and received an endoscopy showing duodenal ulcers. Pt was also 

noted during his previous hospitalization to have bilateral lobe PNA and has 

completed a 7 day course of Levaquin (terminated 12/20/17). Pt this morning was 

having respiratory distress and was noted to have tachypnea to 20 breaths per 

minute and notable accessory muscle use.





ER course was notable for:


(1) Vanc and Zosyn x1


(2) CXR - Elevated left hemidiaphragm and congestive changes with questionable 

superimposed infiltrate. R effusion noted


(3) BCx x2


(4) DNR/DNI, no pressors, no central lines per HCP confirmed


(5) QTc on EKG >530; 1gm IV Magnesium administered


Recent Travel:





PAST MEDICAL HISTORY:


   COPD (3LNC at night)


   HTN


   OA


   PUD - recent endoscopy 12/2017





PAST SURGICAL HISTORY:


   Endoscopy





Social History:


Smoking:


Alcohol:


Drugs: 





Family History:


Allergies





No Known Allergies Allergy (Verified 12/27/17 08:24)


 








HOME MEDICATIONS:


 Home Medications











 Medication  Instructions  Recorded


 


Albuterol 2.5/Ipratropium 0.5 1 amp NEB TID 12/27/17





[Duoneb -]  


 


Budesonide [Pulmicort Flexhaler] 90 mcg IH BID 12/27/17


 


Ipratropium 0.02% Nebulizer 1 amp NEB Q6H PRN 12/27/17





[Atrovent 0.02% Nebulizer -]  


 


Metoclopramide HCl 10 mg PO TID 12/27/17


 


Prednisone 10 mg PO BID 12/27/17


 


Tramadol HCl [Ultram] 50 mg PO DAILY 12/27/17








REVIEW OF SYSTEMS


ROS limited due to medical condition


CONSTITUTIONAL: 


Absent:  fever, chills, generalized weakness


HEENT: 


Absent:  rhinorrhea, nasal congestion, throat pain


CARDIOVASCULAR: 


Absent: chest pain, palpitations, irregular heart rat


RESPIRATORY: 


Present: Shortness of breath, accessory muscle use


Absent: cough, orthopnea, stridor, hemoptysis


GASTROINTESTINAL:


Absent: abdominal pain, nausea, vomiting, diarrhea


NEUROLOGIC: 


Absent: headache, focal weakness or paresthesias, dizziness, bladder or bowel 

incontinence











PHYSICAL EXAMINATION


 Vital Signs - 24 hr











  12/27/17 12/27/17 12/27/17





  08:35 09:46 11:13


 


Temperature  100.8 F H 


 


Pulse Rate  94 H 


 


Pulse Rate [   78





Apical]   


 


Respiratory  26 H 22





Rate   


 


Blood Pressure  120/68 


 


Blood Pressure   113/71





[Left Arm]   


 


O2 Sat by Pulse 91 L 91 L 86 L





Oximetry (%)   














  12/27/17





  12:59


 


Temperature 


 


Pulse Rate 


 


Pulse Rate [ 96 H





Apical] 


 


Respiratory 24





Rate 


 


Blood Pressure 


 


Blood Pressure 125/78





[Left Arm] 


 


O2 Sat by Pulse 86 L





Oximetry (%) 











GENERAL: Moderate distress, awake, unable to speak in full sentences 


EYES: DESEAN, sclera anicteric, conjunctiva clear. No lid lag.


LUNGS: Coarse breath sounds noted bilaterally.  Accessory muscle use. On BIPAP


HEART: Tachycardic with regular rhythm, normal S1 and S2 without murmur, rub or 

gallop.


ABDOMEN: Soft, nontender, nondistended, normoactive bowel sounds, no guarding, 

no masses. 


EXTREMITIES: No peripheral edema. 2+ DP pulses, no calf tenderness, ecchymosis 

on R anterior lower leg


NEUROLOGICAL:  unable to fully assess due to patients medical condition. Face 

symmetrical at baseline





 Laboratory Results - last 24 hr











  12/27/17 12/27/17 12/27/17





  09:15 09:15 09:15


 


WBC  12.8 H  


 


RBC  4.13  


 


Hgb  11.7  


 


Hct  39.6  


 


MCV  95.8  


 


MCH  28.3  


 


MCHC  29.5 L  


 


RDW  16.5 H  


 


MPV  8.5  


 


Neutrophils %  85.0 H  


 


Lymphocytes %  5.7 L  


 


Monocytes %  9.2  


 


Eosinophils %  0.0  


 


Basophils %  0.1  


 


Platelet Estimate  Decreased  


 


Platelet Comment  Present  


 


PT with INR   12.70 H 


 


INR   1.12 


 


PTT (Actin FS)   40.1 H 


 


Puncture Site   


 


ABG pH   


 


ABG pCO2 at Pt Temp   


 


ABG pO2 at Pt Temp   


 


ABG HCO3   


 


ABG O2 Sat (Measured)   


 


ABG O2 Content   


 


ABG Base Excess   


 


Jeffy Test   


 


VBG pH    No Result Required.


 


POC VBG pCO2    No Result Required.


 


POC VBG pO2    No Result Required.


 


Mixed VBG HCO3    No Result Required.


 


Carboxyhemoglobin   


 


Methemoglobin   


 


O2 Delivery Device   


 


Oxygen Flow Rate   


 


Vent Mode   


 


Vent Rate   


 


Mechanical Rate   


 


Sodium   


 


Potassium   


 


Chloride   


 


Carbon Dioxide   


 


Anion Gap   


 


BUN   


 


Creatinine   


 


Creat Clearance w eGFR   


 


Random Glucose   


 


Lactic Acid   


 


Calcium   


 


Total Bilirubin   


 


AST   


 


ALT   


 


Alkaline Phosphatase   


 


Creatine Kinase   


 


Troponin I   


 


Total Protein   


 


Albumin   


 


Blood Type   


 


Antibody Screen   














  12/27/17 12/27/17 12/27/17





  09:15 09:15 09:15


 


WBC   


 


RBC   


 


Hgb   


 


Hct   


 


MCV   


 


MCH   


 


MCHC   


 


RDW   


 


MPV   


 


Neutrophils %   


 


Lymphocytes %   


 


Monocytes %   


 


Eosinophils %   


 


Basophils %   


 


Platelet Estimate   


 


Platelet Comment   


 


PT with INR   


 


INR   


 


PTT (Actin FS)   


 


Puncture Site   


 


ABG pH   


 


ABG pCO2 at Pt Temp   


 


ABG pO2 at Pt Temp   


 


ABG HCO3   


 


ABG O2 Sat (Measured)   


 


ABG O2 Content   


 


ABG Base Excess   


 


Jeffy Test   


 


VBG pH   


 


POC VBG pCO2   


 


POC VBG pO2   


 


Mixed VBG HCO3   


 


Carboxyhemoglobin   


 


Methemoglobin   


 


O2 Delivery Device   


 


Oxygen Flow Rate   


 


Vent Mode   


 


Vent Rate   


 


Mechanical Rate   


 


Sodium  141  


 


Potassium  3.6  


 


Chloride  100  


 


Carbon Dioxide  34 H  


 


Anion Gap  7 L  


 


BUN  13  


 


Creatinine  0.4 L  


 


Creat Clearance w eGFR  > 60  


 


Random Glucose  129 H  


 


Lactic Acid   1.5 


 


Calcium  8.8  


 


Total Bilirubin  0.4  


 


AST  14 L  


 


ALT  12  


 


Alkaline Phosphatase  96  


 


Creatine Kinase  36 L  


 


Troponin I  0.18 H  


 


Total Protein  6.5  


 


Albumin  2.6 L  


 


Blood Type    A POSITIVE


 


Antibody Screen    Negative














  12/27/17





  10:25


 


WBC 


 


RBC 


 


Hgb 


 


Hct 


 


MCV 


 


MCH 


 


MCHC 


 


RDW 


 


MPV 


 


Neutrophils % 


 


Lymphocytes % 


 


Monocytes % 


 


Eosinophils % 


 


Basophils % 


 


Platelet Estimate 


 


Platelet Comment 


 


PT with INR 


 


INR 


 


PTT (Actin FS) 


 


Puncture Site  Right brachial


 


ABG pH  7.40


 


ABG pCO2 at Pt Temp  55.6 H


 


ABG pO2 at Pt Temp  63.3 L


 


ABG HCO3  33.4 H


 


ABG O2 Sat (Measured)  87.5 L


 


ABG O2 Content  13.2 L


 


ABG Base Excess  7.5 H


 


Jeffy Test  Not applicable


 


VBG pH 


 


POC VBG pCO2 


 


POC VBG pO2 


 


Mixed VBG HCO3 


 


Carboxyhemoglobin  1.4


 


Methemoglobin  1.1


 


O2 Delivery Device  Bipap


 


Oxygen Flow Rate  50


 


Vent Mode  S/t 12/6


 


Vent Rate  10


 


Mechanical Rate  Yes


 


Sodium 


 


Potassium 


 


Chloride 


 


Carbon Dioxide 


 


Anion Gap 


 


BUN 


 


Creatinine 


 


Creat Clearance w eGFR 


 


Random Glucose 


 


Lactic Acid 


 


Calcium 


 


Total Bilirubin 


 


AST 


 


ALT 


 


Alkaline Phosphatase 


 


Creatine Kinase 


 


Troponin I 


 


Total Protein 


 


Albumin 


 


Blood Type 


 


Antibody Screen 











ASSESSMENT/PLAN:


87yo M with history of COPD (3LNC at night), HTN, OA, and PUD who was brought 

by ambulance from [facility] due to respiratory distress noted during the 

nursing home rounds this morning








1) Severe sepsis 2/2 suspected aspiration PNA


   --SIRS 4/4 met


   --CXR noted with superimposed infiltrates


   --f/u Cultures


   --Sputum culture, urine antigens, mycoplasma IgM, and flu swab ordered


   --Continue Vancomycin and Zosyn


      --ID consulted


   --IVF D5NS @75cc/hr


   --Ofirmev PRN for fever


   --Aspiration precautions


      --head of bed elevated >30 degrees





2) Acute hypoxic hypercapnic respiratory failure


   --Pulmonology consulted


   --Duoneb QID scheduled


   --Albuterol PRN


   --Medrol 40mg qDaily


   --Would benefit from high-flow O2, however if unable continue BiPap due to 

improving status


   --Maintain SpO2 >90%





3) Elevated Troponin


   --Most likely deman ischemia in the setting of severe sepsis


   --Continue to trend (initial trop 0.18)


   --EKG - RBBB (unknown if new), no ST elevations or depressions noted; QTc 530


      --Mag given in ED





4) HTN


   --Continue to monitor


   --No home medications noted; will have to confirm





FEN:


   Fluids: D5NS @75cc/hr


   Electrolyte abnormalities: None currently


   Nutrition: None currently; will need speech and swallow eval eventually





PPX


   DVT - Moderate risk pt; Heparin SQ TID


   GI - Due to initiation of steroids and possible prolonged ICU stay: Protonix 

40mg IV qDaily





Prognosis: Guarded


Code Status: DNR/DNI, no central lines, no pressor support; confirmed by HCP 

Smita


Dispoisition: Admit to ICU





Case discussed with Dr. Turner and Dr. Aleksey Davila, DO - Internal Medicine PGY-1





Visit type





- Emergency Visit


Emergency Visit: Yes


ED Registration Date: 12/27/17


Care time: The patient presented to the Emergency Department on the above date 

and was hospitalized for further evaluation of their emergent condition.





- New Patient


This patient is new to me today: Yes


Date on this admission: 01/15/18





- Critical Care


Critical Care patient: Yes


Total Critical Care Time (in minutes): 38


Critical Care Statement: The care of this patient involved high complexity 

decision making to prevent further life threatening deterioration of the patient

's condition and/or to evaluate & treat vital organ system(s) failure or risk 

of failure.

## 2017-12-27 NOTE — PN
Teaching Attending Note


Name of Resident: Earl Davila





ATTENDING PHYSICIAN STATEMENT





I saw and evaluated the patient.


I reviewed the resident's note and discussed the case with the resident.


I agree with the resident's findings and plan as documented.








SUBJECTIVE:pt is non verbal. HPI is from resident report


86 year old male with a PMHx of HTN, COPD (3L NC at night), Osteoarthritis, 

Dysphagia, PUD with recent admission to [facility] for GI bleed, who was BIBEMS from 

[facility] due to respiratory distress during rounds at the nursing 

[place] at approximately 07:15 this morning.  Patient's Respiratory rate was in 

the mid 20's with accessory muscle use and abdominal breathing.  According to 

the [facility] records, patient stated that he "doesn't feel good."  Patient 

was given two rounds of DuoNebs at the [facility] with no improvement and 

transferred here.  


Patient's health care proxy at bedside who states he was recently hospitalized 

for GI bleed with an endoscopy done that revealed duodenal ulcers and 

eventually acquired bilateral pneumonia for which he just completed a 7 day 

course of Levaquin on 12/20/17.  











OBJECTIVE:


 Last Vital Signs











Temp Pulse Resp BP Pulse Ox


 


 97.5 F L  75   19   112/61   89 L


 


 12/27/17 16:00  12/27/17 16:40  12/27/17 16:40  12/27/17 16:40  12/27/17 16:13








General resting comfortbale, non verbal, alert


CV S1 S2 +


Lungs coarse breath sounds anteriorly


Abdomen soft NT/ND


Extremities no pedal edema





ASSESSMENT AND PLAN:


86 year old male with a PMHx of HTN, COPD (3L NC at night), Osteoarthritis, 

Dysphagia, PUD presented to the ER with acute hypoxic respiratory distress


1. Acute hypoxic hypercapnic respiratory distress- ICU admission. due to 

possible PNA vs acute on chronic COPD. currently saturating well on high flow 

oxygen. start medrol 40mg Daily. supplemental oxygen to maintain spO2 >88%. 

pulmonary on board


2. Sepsis due to PNA- Tm 100.8 with tachycardia and leukocytosis,. lactic acid 

normal. y[place] HCAP with recent hospitalization. start on Vanco/zosyn. check flu

, legionella, strep. consult ID


3. HTN- currently normotensive. not on medications at home. cont to monitor. 


4. PUD- no sings of bleeding. start ppi as on steroids


5. DVT ppx- hep sq


6. family decided to make status DNR/DNI, no central line or pressors. pt nods 

in agreement to decision.





The care of this patient involved high complexity decision making to prevent 

further life threatening deterioration of the patient's condition and/or to 

evaluate & treat vital organ system(s) failure or risk of failure. 40 minutes

## 2017-12-27 NOTE — EKG
Test Reason : 

Blood Pressure : ***/*** mmHG

Vent. Rate : 104 BPM     Atrial Rate : 104 BPM

   P-R Int : 156 ms          QRS Dur : 148 ms

    QT Int : 408 ms       P-R-T Axes : [phone] degrees

   QTc Int : 536 ms

 

SINUS TACHYCARDIA WITH PREMATURE ATRIAL COMPLEXES WITH ABERRANT

CONDUCTION

RIGHT BUNDLE BRANCH BLOCK

ABNORMAL ECG

NO PREVIOUS ECGS AVAILABLE

Confirmed by CARLA BORJAS MD ([id number]) on 12/27/2017 1:05:52 PM

 

Referred By:             Confirmed By:CARLA BORJAS MD

## 2017-12-27 NOTE — PN
Progress Note (short form)





- Note


Progress Note: 





ID consult dictated





imp/reccd





86 [facility] old man [facility] with fever/hypoxia impending respiratory 

failure


treated with nebs in the field, now awake and alert on high flow nasal canulla





recent admission to [facility] 11/24 to 12/6 for  gi bleed with endoscopy showing 

duodenal ulcers- had bilateral pneumonia- treated with levaquin that he 

completed 12/20





impending resp failure


pneumonia (HAP)


COPD








vanco/zosyn to continue 


f/u cultures


influenza screen


legionella/pneumococcal urinary antigens














Problem List





- Problems


(1) Pneumonia


Code(s): J18.9 - PNEUMONIA, UNSPECIFIED ORGANISM   


Qualifiers: 


   Pneumonia type: due to unspecified organism   Laterality: left   Lung 

location: lower lobe of lung   Qualified Code(s): J18.1 - Lobar pneumonia, 

unspecified organism   





(2) Respiratory failure


Code(s): J96.90 - RESPIRATORY FAILURE, UNSP, UNSP W HYPOXIA OR HYPERCAPNIA   





(3) COPD (chronic obstructive pulmonary disease)


Code(s): J44.9 - CHRONIC OBSTRUCTIVE PULMONARY DISEASE, UNSPECIFIED   


Qualifiers: 


   COPD type: COPD with acute lower respiratory infection   Qualified Code(s): 

J44.0 - Chronic obstructive pulmonary disease with acute lower respiratory 

infection

## 2017-12-27 NOTE — HP
HISTORY OF PRESENT ILLNESS: Unable to obtain information due to patients 

medical condition.  Most information obtained from health care proxy and ED 

staff. 





Patient is an 86 year old male with a PMHx of HTN, COPD (oxygen at night), 

Osteoarthritis, Dysphagia, PUD with recent admission to [facility] for GI bleed, who 

was BIBEMS from [facility] due to respiratory distress during rounds 

at the [facility] at approximately 07:15 this morning.  Patient's Respiratory 

rate was in the mid 20's with accessory muscle use and abdominal breathing.  

According to the [facility] records, patient stated that he "doesn't feel 

good."  Patient was given two rounds of DuoNebs at the [facility] with no 

improvement and transferred here.  


Patient's health care proxy at bedside who states he was recently hospitalized 

for GI bleed with an endoscopy done that revealed duodenal ulcers and 

eventually acquired bilateral pneumonia for which he just completed a 7 day 

course of Levaquin on 12/20/17.  








PHYSICAL EXAMINATION


 Vital Signs - 24 hr











  12/27/17 12/27/17 12/27/17





  08:35 09:46 11:13


 


Temperature  100.8 F H 


 


Pulse Rate  94 H 


 


Pulse Rate [   78





Apical]   


 


Respiratory  26 H 22





Rate   


 


Blood Pressure  120/68 


 


Blood Pressure   113/71





[Left Arm]   


 


O2 Sat by Pulse 91 L 91 L 86 L





Oximetry (%)   











GENERAL: Awake, alert, in respiratory distress on BIPAP, unable to speak in 

full sentences 


EYES: Pupils equal, round and reactive to light, extraocular movements intact, 

sclera anicteric, conjunctiva clear. No lid lag.


LUNGS: Bilateral coarse breath sounds nteriorly.  Accessory muscle use and 

abdominal breathing on BIPAP


HEART: Tachycardic with regular rhythm, normal S1 and S2 without murmur, rub or 

gallop.


ABDOMEN: Soft, nontender, not distended, normoactive bowel sounds, no guarding, 

no rebound, no masses. 


UPPER EXTREMITIES: No peripheral edema.


LOWER EXTREMITIES: 2+ pulses, warm, well-perfused. No calf tenderness. No 

peripheral edema. Echymosis of the right anterior shin 


NEUROLOGICAL:  unable to fully assess due to patients medical condition. No 

facial droop.





 Laboratory Results - last 24 hr











  12/27/17 12/27/17 12/27/17





  09:15 09:15 09:15


 


WBC  12.8 H  


 


RBC  4.13  


 


Hgb  11.7  


 


Hct  39.6  


 


MCV  95.8  


 


MCH  28.3  


 


MCHC  29.5 L  


 


RDW  16.5 H  


 


MPV  8.5  


 


Neutrophils %  85.0 H  


 


Lymphocytes %  5.7 L  


 


Monocytes %  9.2  


 


Eosinophils %  0.0  


 


Basophils %  0.1  


 


PT with INR   12.70 H 


 


INR   1.12 


 


PTT (Actin FS)   40.1 H 


 


Puncture Site   


 


ABG pH   


 


ABG pCO2 at Pt Temp   


 


ABG pO2 at Pt Temp   


 


ABG HCO3   


 


ABG O2 Sat (Measured)   


 


ABG O2 Content   


 


ABG Base Excess   


 


Jeffy Test   


 


VBG pH    No Result Required.


 


POC VBG pCO2    No Result Required.


 


POC VBG pO2    No Result Required.


 


Mixed VBG HCO3    No Result Required.


 


Carboxyhemoglobin   


 


Methemoglobin   


 


O2 Delivery Device   


 


Oxygen Flow Rate   


 


Vent Mode   


 


Vent Rate   


 


Mechanical Rate   


 


Sodium   


 


Potassium   


 


Chloride   


 


Carbon Dioxide   


 


Anion Gap   


 


BUN   


 


Creatinine   


 


Creat Clearance w eGFR   


 


Random Glucose   


 


Lactic Acid   


 


Calcium   


 


Total Bilirubin   


 


AST   


 


ALT   


 


Alkaline Phosphatase   


 


Creatine Kinase   


 


Troponin I   


 


Total Protein   


 


Albumin   


 


Blood Type   


 


Antibody Screen   














  12/27/17 12/27/17 12/27/17





  09:15 09:15 09:15


 


WBC   


 


RBC   


 


Hgb   


 


Hct   


 


MCV   


 


MCH   


 


MCHC   


 


RDW   


 


MPV   


 


Neutrophils %   


 


Lymphocytes %   


 


Monocytes %   


 


Eosinophils %   


 


Basophils %   


 


PT with INR   


 


INR   


 


PTT (Actin FS)   


 


Puncture Site   


 


ABG pH   


 


ABG pCO2 at Pt Temp   


 


ABG pO2 at Pt Temp   


 


ABG HCO3   


 


ABG O2 Sat (Measured)   


 


ABG O2 Content   


 


ABG Base Excess   


 


Jeffy Test   


 


VBG pH   


 


POC VBG pCO2   


 


POC VBG pO2   


 


Mixed VBG HCO3   


 


Carboxyhemoglobin   


 


Methemoglobin   


 


O2 Delivery Device   


 


Oxygen Flow Rate   


 


Vent Mode   


 


Vent Rate   


 


Mechanical Rate   


 


Sodium  141  


 


Potassium  3.6  


 


Chloride  100  


 


Carbon Dioxide  34 H  


 


Anion Gap  7 L  


 


BUN  13  


 


Creatinine  0.4 L  


 


Creat Clearance w eGFR  > 60  


 


Random Glucose  129 H  


 


Lactic Acid   1.5 


 


Calcium  8.8  


 


Total Bilirubin  0.4  


 


AST  14 L  


 


ALT  12  


 


Alkaline Phosphatase  96  


 


Creatine Kinase  36 L  


 


Troponin I  0.18 H  


 


Total Protein  6.5  


 


Albumin  2.6 L  


 


Blood Type    A POSITIVE


 


Antibody Screen    Negative














  12/27/17





  10:25


 


WBC 


 


RBC 


 


Hgb 


 


Hct 


 


MCV 


 


MCH 


 


MCHC 


 


RDW 


 


MPV 


 


Neutrophils % 


 


Lymphocytes % 


 


Monocytes % 


 


Eosinophils % 


 


Basophils % 


 


PT with INR 


 


INR 


 


PTT (Actin FS) 


 


Puncture Site  Right brachial


 


ABG pH  7.40


 


ABG pCO2 at Pt Temp  55.6 H


 


ABG pO2 at Pt Temp  63.3 L


 


ABG HCO3  33.4 H


 


ABG O2 Sat (Measured)  87.5 L


 


ABG O2 Content  13.2 L


 


ABG Base Excess  7.5 H


 


Jeffy Test  Not applicable


 


VBG pH 


 


POC VBG pCO2 


 


POC VBG pO2 


 


Mixed VBG HCO3 


 


Carboxyhemoglobin  1.4


 


Methemoglobin  1.1


 


O2 Delivery Device  Bipap


 


Oxygen Flow Rate  50


 


Vent Mode  S/t 12/6


 


Vent Rate  10


 


Mechanical Rate  Yes


 


Sodium 


 


Potassium 


 


Chloride 


 


Carbon Dioxide 


 


Anion Gap 


 


BUN 


 


Creatinine 


 


Creat Clearance w eGFR 


 


Random Glucose 


 


Lactic Acid 


 


Calcium 


 


Total Bilirubin 


 


AST 


 


ALT 


 


Alkaline Phosphatase 


 


Creatine Kinase 


 


Troponin I 


 


Total Protein 


 


Albumin 


 


Blood Type 


 


Antibody Screen 





IMAGES





Chest X-Ray (12/27/17): Imaging reveals scoliosis with degenerative changes, 

convexity to the right, elevated left hemidiaphragm, normal heart, sclerotic 

knob and congestive ages with questionable superimposed infiltrate. There is a 

right effusion. The soft tissues are intact. 





ASSESSMENT/PLAN:


Patient is an 86 year old male who was found to be in respiratory distress at 

the nursing home and was found to have sepsis with acute respiratory distress.  

Patient admitted for further monitoring and management.





Sepsis Likely secondary to Healthcare Acquired Pneumonia


-Fever 100.8, tachy >90, Tachypnia >20, Leukocytosis 12.8 


-Chest X-Ray showing infiltrates 


-Recently discharged from [facility] due to GI bleed from duodenal ulcer 

and bilateral PNA.  Completed a course of Levaquin on 12/20/17 due to bilateral 

Pneumonia. 


-Urine antigens, Mycoplasma IgM, sputum cultures, and influenza swab ordered 


-Zosyn 3.375mg IVPB ordered Q8H with one dose of Vancomycin 1000mg given. Will 

resume if ID approves.


-IV fluids with D5NS @75mls/hr 


-IV Tylenol 1000mg IVPB PRN for fevers


-Blood cultures pending 


-HOB


-Aspiration precautions


-ICU monitoring 


-Pulmonology consult appreciated


-ID consult placed 





Acute Hypoxic Hypercapnic Respiratory failure 


-Possibly secondary to PNA vs. COPD exacerbation. Patient has history of COPD 

with 02 dependence (3L).  Patient also has history of Dysphagia and possibly 

aspirated.


-DuoNeb QID standing 


-Albuterol PRN


-MethylPrednisone 40mg daily 


-Continue IV antibiotics with Zosyn and Vancomycin


-Continue NIPPV and maintain 02 >90% 





Elevated Troponins


-Likely secondary to demand ischemia 


-Initial Troponins 0.18


-EKG revealed no ST elevations or depressions but did show RBBB. No previous 

EKG for comparison


-Will continue to trend troponins


-Cardiac monitoring 





QTc Prolongation


-Initial EKG revealed >530


-Magnesium 1gm IV given in ED


-Will need to avoid medications that prolong QTc such as Zofran, azithromycin 

and Levaquin 


-Serial EKG's





HTN


-On no medications currently 


-Will continue to monitor BP 





F/E/N


-IV D5/NS@75mls/hr


-Electrolytes wnl


-NPO





Prophylaxis


-Moderate risk. Heparin 5000 units SQ Q8H for DVT


-Protonix 40mg IV daily due to steroid use 





Disposition


-DNR/DNI. Patient's healthcare proxy at bedside who has official documentation 

of DNR/DNI.  Also asked patient three times and nods with agreement  


-Patient remains hypoxic on BIPAP and will need to be in the ICU for further 

monitoring.  Will likely require three days minimum as inpatient. 








Visit type





- Emergency Visit


Emergency Visit: Yes


ED Registration Date: 12/27/17


Care time: The patient presented to the Emergency Department on the above date 

and was hospitalized for further evaluation of their emergent condition.





- New Patient


This patient is new to me today: Yes


Date on this admission: 12/27/17





- Critical Care


Critical Care patient: Yes


Total Critical Care Time (in minutes): 45


Critical Care Statement: The care of this patient involved high complexity 

decision making to prevent further life threatening deterioration of the patient

's condition and/or to evaluate & treat vital organ system(s) failure or risk 

of failure.

## 2017-12-28 LAB
ANION GAP SERPL CALC-SCNC: 5 MMOL/L (ref 8–16)
ARTERIAL BLOOD GAS PCO2: 53.1 MMHG (ref 35–45)
ARTERIAL PATENCY WRIST A: POSITIVE
BASE EXCESS BLDA CALC-SCNC: 8.6 MEQ/L (ref -2–2)
BUN SERPL-MCNC: 9 MG/DL (ref 7–18)
CALCIUM SERPL-MCNC: 8.3 MG/DL (ref 8.5–10.1)
CHLORIDE SERPL-SCNC: 101 MMOL/L (ref 98–107)
CO2 SERPL-SCNC: 35 MMOL/L (ref 21–32)
CREAT SERPL-MCNC: 0.2 MG/DL (ref 0.7–1.3)
DEPRECATED RDW RBC AUTO: 16.4 % (ref 11.9–15.9)
GLUCOSE SERPL-MCNC: 124 MG/DL (ref 74–106)
HCT VFR BLD CALC: 35.4 % (ref 35.4–49)
HGB BLD-MCNC: 11 GM/DL (ref 11.7–16.9)
MAGNESIUM SERPL-MCNC: 2.2 MG/DL (ref 1.8–2.4)
MCH RBC QN AUTO: 29.6 PG (ref 25.7–33.7)
MCHC RBC AUTO-ENTMCNC: 31.1 G/DL (ref 32–35.9)
MCV RBC: 95.1 FL (ref 80–96)
PHOSPHATE SERPL-MCNC: 2 MG/DL (ref 2.5–4.9)
PLATELET # BLD AUTO: 217 K/MM3 (ref 134–434)
PMV BLD: 8.5 FL (ref 7.5–11.1)
PO2 BLDA: 62.3 MMHG (ref 68–100)
POTASSIUM SERPLBLD-SCNC: 3.4 MMOL/L (ref 3.5–5.1)
RBC # BLD AUTO: 3.72 M/MM3 (ref 4–5.6)
SAO2 % BLDA: 92.7 % (ref 90–98.9)
SODIUM SERPL-SCNC: 141 MMOL/L (ref 136–145)
WBC # BLD AUTO: 14.7 K/MM3 (ref 4–10)

## 2017-12-28 RX ADMIN — HEPARIN SODIUM SCH UNIT: 5000 INJECTION, SOLUTION INTRAVENOUS; SUBCUTANEOUS at 13:21

## 2017-12-28 RX ADMIN — IPRATROPIUM BROMIDE AND ALBUTEROL SULFATE SCH AMP: .5; 3 SOLUTION RESPIRATORY (INHALATION) at 22:20

## 2017-12-28 RX ADMIN — PIPERACILLIN SODIUM,TAZOBACTAM SODIUM SCH MLS/HR: 3; .375 INJECTION, POWDER, FOR SOLUTION INTRAVENOUS at 02:01

## 2017-12-28 RX ADMIN — HEPARIN SODIUM SCH UNIT: 5000 INJECTION, SOLUTION INTRAVENOUS; SUBCUTANEOUS at 06:27

## 2017-12-28 RX ADMIN — IPRATROPIUM BROMIDE AND ALBUTEROL SULFATE SCH AMP: .5; 3 SOLUTION RESPIRATORY (INHALATION) at 00:00

## 2017-12-28 RX ADMIN — PANTOPRAZOLE SODIUM SCH MG: 40 INJECTION, POWDER, FOR SOLUTION INTRAVENOUS at 10:03

## 2017-12-28 RX ADMIN — VANCOMYCIN HYDROCHLORIDE SCH MLS/HR: 1 INJECTION, POWDER, LYOPHILIZED, FOR SOLUTION INTRAVENOUS at 21:05

## 2017-12-28 RX ADMIN — MUPIROCIN SCH APPLIC: 20 OINTMENT TOPICAL at 10:38

## 2017-12-28 RX ADMIN — PIPERACILLIN SODIUM,TAZOBACTAM SODIUM SCH MLS/HR: 3; .375 INJECTION, POWDER, FOR SOLUTION INTRAVENOUS at 10:04

## 2017-12-28 RX ADMIN — IPRATROPIUM BROMIDE AND ALBUTEROL SULFATE SCH AMP: .5; 3 SOLUTION RESPIRATORY (INHALATION) at 05:44

## 2017-12-28 RX ADMIN — IPRATROPIUM BROMIDE AND ALBUTEROL SULFATE SCH AMP: .5; 3 SOLUTION RESPIRATORY (INHALATION) at 12:36

## 2017-12-28 RX ADMIN — IPRATROPIUM BROMIDE AND ALBUTEROL SULFATE SCH AMP: .5; 3 SOLUTION RESPIRATORY (INHALATION) at 18:34

## 2017-12-28 RX ADMIN — VANCOMYCIN HYDROCHLORIDE SCH MLS/HR: 1 INJECTION, POWDER, LYOPHILIZED, FOR SOLUTION INTRAVENOUS at 10:04

## 2017-12-28 RX ADMIN — METHYLPREDNISOLONE SODIUM SUCCINATE SCH MG: 40 INJECTION, POWDER, FOR SOLUTION INTRAMUSCULAR; INTRAVENOUS at 10:04

## 2017-12-28 RX ADMIN — HEPARIN SODIUM SCH UNIT: 5000 INJECTION, SOLUTION INTRAVENOUS; SUBCUTANEOUS at 21:06

## 2017-12-28 RX ADMIN — PIPERACILLIN SODIUM,TAZOBACTAM SODIUM SCH MLS/HR: 3; .375 INJECTION, POWDER, FOR SOLUTION INTRAVENOUS at 17:39

## 2017-12-28 NOTE — PN
Progress Note (short form)





- Note


Progress Note: 





much more comfortable today


on nc 


alert and conversant


no complaints





 Vital Signs











 Period  Temp  Pulse  Resp  BP Sys/Penaloza  Pulse Ox


 


 Last 24 Hr  97.5 F-98.8 F  71-89  13-32  107-134/50-73  89-98








cor-rrr


lungs decreased bs at bases


abd soft,nt


ext no edema





 CBC, BMP





 12/28/17 05:10 





 12/28/17 05:10 





 Microbiology





12/27/17 09:15   Blood - Peripheral Venous   Blood Culture - Preliminary


                            NO GROWTH OBTAINED AFTER 24 HOURS, INCUBATION TO 

CONTINUE


                            FOR 4 DAYS.


12/27/17 09:15   Blood - Peripheral Venous   Blood Culture - Preliminary


                            NO GROWTH OBTAINED AFTER 24 HOURS, INCUBATION TO 

CONTINUE


                            FOR 4 DAYS.


12/27/17 17:30   Nasopharyngeal Swab   Influenza Types A,B Antigen (JAISON) - Final


12/27/17 17:30   Nasopharyngeal Swab    - Final





Active Medications











Generic Name Dose Route Start Last Admin





  Trade Name Freq  PRN Reason Stop Dose Admin


 


Acetaminophen  1,000 mg  12/27/17 13:29  12/28/17 09:00





  Ofirmev Injection -  IVPB   1,000 mg





  Q6H PRN   Administration





  FEVER OR PAIN   


 


Albuterol Sulfate  1 amp  12/27/17 12:43  





  Ventolin 0.083% Nebulizer Soln -  NEB   





  Q4H PRN   





  SHORT OF BREATH/WHEEZING   


 


Albuterol/Ipratropium  1 amp  12/27/17 18:00  12/28/17 12:36





  Duoneb -  NEB   1 amp





  QIDR LIV   Administration


 


Chlorhexidine Gluconate  1 applic  12/27/17 22:00  12/27/17 21:18





  Hibiclens For Decolonization -  TP   1 applic





  HS LIV   Administration


 


Heparin Sodium (Porcine)  5,000 unit  12/27/17 12:30  12/28/17 13:21





  Heparin -  SQ   5,000 unit





  TID LIV   Administration


 


Vancomycin HCl 1,000 mg/  250 mls @ 250 mls/hr  12/27/17 22:00  12/28/17 10:04





  Dextrose  IVPB   250 mls/hr





  BID LIV   Administration





  Protocol   


 


Piperacillin Sod/Tazobactam  100 mls @ 200 mls/hr  12/27/17 18:00  12/28/17 10:

04





  Sod 3.375 gm/ Dextrose  IVPB   200 mls/hr





  Q8H-IV LIV   Administration


 


Methylprednisolone Sodium Succinate  40 mg  12/27/17 13:30  12/28/17 10:04





  Solu-Medrol -  IVPUSH   40 mg





  DAILY LIV   Administration


 


Mupirocin  1 applic  12/27/17 22:00  12/28/17 10:38





  Bactroban Ointment (For Decolonization) -  NS  01/01/18 21:59  1 applic





  BID LIV   Administration


 


Pantoprazole Sodium  40 mg  12/27/17 15:15  12/28/17 10:03





  Protonix Iv  IVPUSH   40 mg





  DAILY LIV   Administration








a/p


i


pneumonia (HAP)


COPD








vanco/zosyn to continue 


f/u cultures


influenza screen negative


legionella/pneumococcal urinary antigens-reorder


check vanco trough














Problem List





- Problems


(1) Pneumonia


Code(s): J18.9 - PNEUMONIA, UNSPECIFIED ORGANISM   


Qualifiers: 


   Pneumonia type: due to unspecified organism   Laterality: left   Lung 

location: lower lobe of lung   Qualified Code(s): J18.1 - Lobar pneumonia, 

unspecified organism   





(2) Respiratory failure


Code(s): J96.90 - RESPIRATORY FAILURE, UNSP, UNSP W HYPOXIA OR HYPERCAPNIA   





(3) COPD (chronic obstructive pulmonary disease)


Code(s): J44.9 - CHRONIC OBSTRUCTIVE PULMONARY DISEASE, UNSPECIFIED   


Qualifiers: 


   COPD type: COPD with acute lower respiratory infection   Qualified Code(s): 

J44.0 - Chronic obstructive pulmonary disease with acute lower respiratory 

infection

## 2017-12-28 NOTE — PN
Teaching Attending Note


Name of Resident: Earl Davila





ATTENDING PHYSICIAN STATEMENT





I saw and evaluated the patient.


I reviewed the resident's note and discussed the case with the resident.


I agree with the resident's findings and plan as documented.








SUBJECTIVE:resting comfortable. answers no to all questions








OBJECTIVE:


 Last Vital Signs











Temp Pulse Resp BP Pulse Ox


 


 98.6 F   80   24   129/64   90 L


 


 12/28/17 16:00  12/28/17 16:00  12/28/17 16:00  12/28/17 16:00  12/28/17 12:19











General NAD


CV S1 S2 +


Lungs coarse breath sounds anteriorly


Abdomen soft NT/ND


Extremities no pedal edema





ASSESSMENT AND PLAN:


86 year old male with a PMHx of HTN, COPD (3L NC at night), Osteoarthritis, 

Dysphagia, PUD presented to the ER with acute hypoxic respiratory distress


1. Acute hypoxic hypercapnic respiratory distress- due to possible PNA vs acute 

on chronic COPD. currently saturating well on high flow oxygen.on medrol 40mg 

Daily,. supplemental oxygen to maintain spO2 >88%. pulmonary on board


2. Sepsis due to PNA- afebrile. leukocytosis improving. on Vanco/zosyn day 2. 

Flu negative. check legionella, strep. consult ID


3. HTN- currently normotensive. not on medications at home. cont to monitor. 


4. PUD- no sings of bleeding. stress ulcer ppx


5. DVT ppx- hep sq


6. family decided to make status DNR/DNI, no central line or pressors. 





The care of this patient involved high complexity decision making to prevent 

further life threatening deterioration of the patient's condition and/or to 

evaluate & treat vital organ system(s) failure or risk of failure. 35 minutes

## 2017-12-28 NOTE — RAPID
Physical Examination


Vital Signs: 


 Vital Signs











Temperature  97.5 F L  12/28/17 17:41


 


Pulse Rate  98 H  12/28/17 17:41


 


Respiratory Rate  20   12/28/17 17:41


 


Blood Pressure  127/73   12/28/17 17:41


 


O2 Sat by Pulse Oximetry (%)  90 L  12/28/17 12:19











Constitutional: Yes: Anxious


Eyes: Yes: WNL


HENT: Yes: WNL, Atraumatic, Normocephalic


Neck: Yes: WNL, Trachea Midline


Cardiovascular: Yes: WNL, Regular Rate and Rhythm


Respiratory: Yes: Tachypnea, Other (BL diffuse crackles, decreased lung sounds 

at bases)


Gastrointestinal: Yes: WNL, Normal Bowel Sounds, Soft


Extremities: Yes: WNL


Edema: No


Peripheral Pulses WNL: Yes


Labs: 


 CBC, BMP





 12/28/17 05:10 





 12/28/17 05:10 











Rapid Response





- Rapid Response


Assessment: 





Rapid Response called by nurse due to pt tachypnea, decreased sat to 85%, 

increased WOB





Vitals 121/78 pulse 95 sat 85% on 3L NC, T 97.5


PE:


NCAT


S1S2 no m/c/r/g


BL pulmonary crackles, decreased breath sounds at base


Abdomen S/NT/ND


No LE edema. 2+ pulses DP, PT





Patient placed on Venti mask w/ correction of sats to low 90s. Given one dose 

lasix 40mg IV. Stat EKG, CXR, BMP, Trops ordered.





Assessment:


f/u CXR, EKG


f/u BMP, Trops


Venti mask w/ escalation to NRB as needed. If persistently hypoxic, start on 

bipap and send stat ABG


Replete K, given lasix dose

## 2017-12-28 NOTE — PN
Physical Exam: 


SUBJECTIVE: Patient looks comfortable today. Minimal speaking, but verbal with 

yes and no questions. Says no to associated symptoms, but unsure if reliable at 

this point.








OBJECTIVE:





 Vital Signs











 Period  Temp  Pulse  Resp  BP Sys/Penaloza  Pulse Ox


 


 Last 24 Hr  97.6 F-98.8 F  71-89  19-32  107-134/50-71  89-98











GENERAL: NAD, awake, alert to verbal stimulus


HEENT: NC/AT, No JVD, sclera anicteric, EOMI, DESEAN


LUNGS: Coarse breath sounds anteriorly, questionable superimposed rales? No 

accessory muscle use. 


HEART: RRR, S1, S2 without murmur


ABDOMEN: Soft, nontender, nondistended, normoactive bowel sounds, no guarding


EXTREMITIES: 2+ DP pulses, no edema, DIP joint deviation noted on hands b/l 


NEUROLOGICAL: Cannot assess due to clinical condition


SKIN: Warm, no rashes noted, sacral ulcer noted














 Laboratory Results - last 24 hr











  12/27/17 12/27/17 12/27/17





  09:15 09:15 18:00


 


WBC   


 


RBC   


 


Hgb   


 


Hct   


 


MCV   


 


MCH   


 


MCHC   


 


RDW   


 


Plt Count   


 


MPV   


 


Platelet Comment   


 


Puncture Site   


 


ABG pH   


 


ABG pCO2 at Pt Temp   


 


ABG pO2 at Pt Temp   


 


ABG HCO3   


 


ABG O2 Sat (Measured)   


 


ABG O2 Content   


 


ABG Base Excess   


 


Jeffy Test   


 


VBG pH  Np  


 


PEEP   


 


Pressure Support Vent   


 


Sodium   


 


Potassium   


 


Chloride   


 


Carbon Dioxide   


 


Anion Gap   


 


BUN   


 


Creatinine   


 


Random Glucose   


 


Lactic Acid   1.5 


 


Calcium   


 


Phosphorus   


 


Magnesium   


 


Troponin I    0.12 H D


 


B-Natriuretic Peptide   


 


Urine Color   


 


Urine Appearance   


 


Urine pH   


 


Ur Specific Gravity   


 


Urine Protein   


 


Urine Glucose (UA)   


 


Urine Ketones   


 


Urine Blood   


 


Urine Nitrite   


 


Urine Bilirubin   


 


Urine Urobilinogen   


 


Ur Leukocyte Esterase   


 


Urine WBC (Auto)   


 


Urine RBC (Auto)   


 


Ur Epithelial Cells   


 


Urine Bacteria   


 


Urine Mucus   














  12/27/17 12/27/17 12/28/17





  18:00 18:08 05:10


 


WBC   


 


RBC   


 


Hgb   


 


Hct   


 


MCV   


 


MCH   


 


MCHC   


 


RDW   


 


Plt Count   


 


MPV   


 


Platelet Comment   


 


Puncture Site   


 


ABG pH   


 


ABG pCO2 at Pt Temp   


 


ABG pO2 at Pt Temp   


 


ABG HCO3   


 


ABG O2 Sat (Measured)   


 


ABG O2 Content   


 


ABG Base Excess   


 


Jeffy Test   


 


VBG pH   


 


PEEP   


 


Pressure Support Vent   


 


Sodium    141


 


Potassium    3.4 L


 


Chloride    101


 


Carbon Dioxide    35 H


 


Anion Gap    5 L


 


BUN    9  D


 


Creatinine    0.2 L D


 


Random Glucose    124 H


 


Lactic Acid   


 


Calcium    8.3 L


 


Phosphorus    2.0 L


 


Magnesium    2.2


 


Troponin I   


 


B-Natriuretic Peptide  1599.52 H  


 


Urine Color   Yellow 


 


Urine Appearance   Cloudy 


 


Urine pH   5.0 


 


Ur Specific Gravity   1.032 


 


Urine Protein   1+ H 


 


Urine Glucose (UA)   Negative 


 


Urine Ketones   Negative 


 


Urine Blood   Negative 


 


Urine Nitrite   Negative 


 


Urine Bilirubin   Negative 


 


Urine Urobilinogen   Negative 


 


Ur Leukocyte Esterase   Negative 


 


Urine WBC (Auto)   3 


 


Urine RBC (Auto)   7 


 


Ur Epithelial Cells   Rare 


 


Urine Bacteria   Rare 


 


Urine Mucus   Rare 














  12/28/17 12/28/17





  05:10 09:30


 


WBC  14.7 H 


 


RBC  3.72 L 


 


Hgb  11.0 L 


 


Hct  35.4 


 


MCV  95.1 


 


MCH  29.6 


 


MCHC  31.1 L 


 


RDW  16.4 H 


 


Plt Count  217 


 


MPV  8.5 


 


Platelet Comment  No clumping noted 


 


Puncture Site   Right radial


 


ABG pH   7.42


 


ABG pCO2 at Pt Temp   53.1 H


 


ABG pO2 at Pt Temp   62.3 L


 


ABG HCO3   34.1 H


 


ABG O2 Sat (Measured)   92.7


 


ABG O2 Content   14.1 L


 


ABG Base Excess   8.6 H


 


Jeffy Test   Positive


 


VBG pH  


 


PEEP   5.0


 


Pressure Support Vent   300


 


Sodium  


 


Potassium  


 


Chloride  


 


Carbon Dioxide  


 


Anion Gap  


 


BUN  


 


Creatinine  


 


Random Glucose  


 


Lactic Acid  


 


Calcium  


 


Phosphorus  


 


Magnesium  


 


Troponin I  


 


B-Natriuretic Peptide  


 


Urine Color  


 


Urine Appearance  


 


Urine pH  


 


Ur Specific Gravity  


 


Urine Protein  


 


Urine Glucose (UA)  


 


Urine Ketones  


 


Urine Blood  


 


Urine Nitrite  


 


Urine Bilirubin  


 


Urine Urobilinogen  


 


Ur Leukocyte Esterase  


 


Urine WBC (Auto)  


 


Urine RBC (Auto)  


 


Ur Epithelial Cells  


 


Urine Bacteria  


 


Urine Mucus  








Active Medications











Generic Name Dose Route Start Last Admin





  Trade Name Freq  PRN Reason Stop Dose Admin


 


Acetaminophen  1,000 mg  12/27/17 13:29  12/28/17 09:00





  Ofirmev Injection -  IVPB   1,000 mg





  Q6H PRN   Administration





  FEVER OR PAIN   


 


Albuterol Sulfate  1 amp  12/27/17 12:43  





  Ventolin 0.083% Nebulizer Soln -  NEB   





  Q4H PRN   





  SHORT OF BREATH/WHEEZING   


 


Albuterol/Ipratropium  1 amp  12/27/17 18:00  12/28/17 12:36





  Duoneb -  NEB   1 amp





  QIDR LIV   Administration


 


Chlorhexidine Gluconate  1 applic  12/27/17 22:00  12/27/17 21:18





  Hibiclens For Decolonization -  TP   1 applic





  HS LIV   Administration


 


Heparin Sodium (Porcine)  5,000 unit  12/27/17 12:30  12/28/17 13:21





  Heparin -  SQ   5,000 unit





  TID LIV   Administration


 


Vancomycin HCl 1,000 mg/  250 mls @ 250 mls/hr  12/27/17 22:00  12/28/17 10:04





  Dextrose  IVPB   250 mls/hr





  BID LIV   Administration





  Protocol   


 


Piperacillin Sod/Tazobactam  100 mls @ 200 mls/hr  12/27/17 18:00  12/28/17 10:

04





  Sod 3.375 gm/ Dextrose  IVPB   200 mls/hr





  Q8H-IV LIV   Administration


 


Methylprednisolone Sodium Succinate  40 mg  12/27/17 13:30  12/28/17 10:04





  Solu-Medrol -  IVPUSH   40 mg





  DAILY LIV   Administration


 


Mupirocin  1 applic  12/27/17 22:00  12/28/17 10:38





  Bactroban Ointment (For Decolonization) -  NS  01/01/18 21:59  1 applic





  BID LIV   Administration


 


Pantoprazole Sodium  40 mg  12/27/17 15:15  12/28/17 10:03





  Protonix Iv  IVPUSH   40 mg





  DAILY LIV   Administration











ASSESSMENT/PLAN:


87yo M with history of COPD (3LNC at night), HTN, OA, and PUD who was brought 

by ambulance from [facility] due to respiratory distress noted during the 

nursing home rounds this morning





1) Severe sepsis 2/2 suspected aspiration PNA


   --SIRS 4/4 met


   --CXR noted with superimposed infiltrates


   --f/u Cultures


   --Sputum culture, urine antigens, mycoplasma IgM, and flu swab ordered


   --Continue Vancomycin and Zosyn Day 2


      --ID consulted


   --Ofirmev PRN for fever


   --Aspiration precautions


      --head of bed elevated >30 degrees





2) Acute hypoxic hypercapnic respiratory failure


   --Pulmonology consulted


   --Duoneb QID scheduled


   --Albuterol PRN


   --Continue solu-medrol 40mg qDaily


   --Would benefit from high-flow O2, however if unable continue BiPap due to 

improving status


   --Maintain SpO2 >90%





3) Elevated Troponin


   --Most likely demand ischemia in the setting of severe sepsis


   --Continue to trend (initial trop 0.18)


   --EKG - RBBB (unknown if new), no ST elevations or depressions noted; QTc 530


      --Mag given in ED





4) HTN


   --Continue to monitor


   --No home medications noted; will have to confirm





FEN:


   Fluids: Hold fluids due to overload?


   Electrolyte abnormalities: None currently


   Nutrition: None currently; will need speech and swallow eval eventually





PPX


   DVT - Moderate risk pt; Heparin SQ TID


   GI - Due to initiation of steroids and possible prolonged ICU stay: Protonix 

40mg IV qDaily





Code Status: DNR/DNI, no central lines, no pressor support; confirmed by HCP 

Smita


Dispoisition: Continue ICU monitoring





Case discussed with Dr. Johnny Davila, DO - Internal Medicine PGY-1








Visit type





- Emergency Visit


Emergency Visit: No





- New Patient


This patient is new to me today: No





- Critical Care


Critical Care patient: Yes


Total Critical Care Time (in minutes): 36


Critical Care Statement: The care of this patient involved high complexity 

decision making to prevent further life threatening deterioration of the patient

's condition and/or to evaluate & treat vital organ system(s) failure or risk 

of failure.

## 2017-12-28 NOTE — CONSULT
Admitting History and Physical





- Primary Care Physician


PCP: Sonia Turner





- Admission


History of Present Illness: 


Per EMR:


hypoxemic respiratory failure


R/O PNA vs fluid overload 


Acute COPD exacerbation 


Troponin leak likely demand ischemia 


HTN


Recent GIB, now with stable Hgb








pt transferred from [facility], with h/o PNA/dysphagia on transfer summary. Pt was 

on pureed diet and thin liquid. Per [facility] Sp path, he refuses to sit up at NH.





Pt referred as noted to cough on thin liquids in ICU. He is on a soft diet, 

mashed by CNA, with persistent difficulty.


History Source: Patient, Medical Record


Limitations to Obtaining History: No Limitations





- Past Medical History


CNS: No: Alzheimer's


Cardiovascular: No: AFIB


Pulmonary: Yes: COPD, O2 Dependent, Pneumonia


Gastrointestinal: Yes: Diverticulosis, GI Bleed, Peptic Ulcer Disease, Other (

GASTRIC OUTLET OBSTRUCTION)


Hepatobiliary: No: Cirrhosis


Renal/: No: Renal Failure


Heme/Onc: Yes: Anemia


Infectious Disease: No: AIDS


Psych: No: Addictions





- Advance Directives


Advance Directives: Yes: Health Care Proxy, DNR





- Smoking History


Smoking history: Unknown if ever smoked





- Alcohol/Substance Use


Hx Alcohol Use: No


History of Substance Use: reports: None





- Social History


ADL: Support Services


History of Recent Travel: No





History





- Admission


Reason For Visit: SEPSIS,COPD,PNEUMONIA





- Diagnostics


X-ray: Report Reviewed





- General


Mental Status: Alert and Oriented, Awake and Alert, Able to Follow Commands


Attention: Intact


Ability to Follow Directions: Good


Head/Neck Control: WFL





- Hearing


Hearing: Impaired





Speech Evaluation





- Communication


Primary Language: ENGLISH


Communication: Yes: Within Normal Limits


Oral Expression Ability: Yes: No Impairment





- Speech Production


Able to Make Needs Known: Yes: WNL


Intelligibility: Yes: Mildly Impaired





- Speech Characteristics


Voice Loudness: Mildly Soft/Quiet, Hypophonia


Voice Pitch: Yes: Pitch Breaks


Voice Phonatory-based Quality: Yes: Breathy, Weak, Dysphonia


Speech Pattern: Impaired


Speech Clarity: < 100%


Nasal Resonance: Normal


Articulation: Yes: Precise





- Language/Auditory Comprehension


Follows: Yes: 2 Stage Simple Commands





- Language/Verbal Expression


Able to Respond to Simple Queries: Yes: WNL


Able to Communicate Wants and Needs: Yes: WNL


Functional Communication Status: Yes: WNL





- Memory/Perception


Long term Memory: Yes: WNL


Short Term Memory: Yes: WNL





- Swallow Evaluation/Bedside Assessment


Current Nutritional Intake: Soft, Thin Liquids


Dentition: Yes: Edentulous


Facial Symmetry at Rest: Symmetrical


Facial Symmetry on Retraction: Symmetrical


Facial Movement: Controlled


Sensation: Normal


Jaw Position: Open at Rest


Against Resistance Opening: Weak


Pucker Lips: Normal


Smile: Normal


Lingual Movement: Symmetric


Lingual Speed of Movement: Normal


Lingual Movement Strgth Against Opposition: Normal


Lingual Movement Characteristics: Normal


Velopharyngeal Movement: Normal


Laryngeal Elevation: Impaired


Laryngeal Movement: Reduced Excursion, Labored,delay initiation, Reduced 

Velocity


Bolus Size: WFL


Labial Seal: WFL


Chewing: Impaired


Oral Prep Time: WFL


A-P Transit: WFL


Pocketing: None


Timing of Swallow: Delayed


Coughing/Throat Clear: Yes





Recommendations





- Speech Evaluation, Impression/Plan


Impression: Dysphonia with impaired breath support for speech. Swallow reflex 

is quite weak, with delayed onset and reduced laryngeal excursion and rate. 

Suspect pharyngeal stasis and intermittent aspiration. Pt was a good historian 

for me. o2 saturation was 85-93 with nasal o2.





- Disposition


[facility]





- Dysphagia Impressions/Plan


Swallowing Skills: Impaired


Dysphagia Impressions: Moderate Impairment


*Silent aspiration: cannot be R/O at bedside


Dysphagia Treatment Plan: Small Bites, Chin Tuck/Down, 1/2 tsp. at a time, 

Other (Swallow is weak. HOB fully elevated, chin tucked down, tell pt to 

swallow HARD, TWICE, per 1/2 tsp)


Recommendations: Modified Barium Swallow, Other (monitor tolerance. NPO if cough

, congestion, fever)





- Recommendations


Diet Consistency: Dysphagia Pureed (thinned out with gravy)


Liquids: Nectar Thick


Supplement: Other (ensure compact)

## 2017-12-28 NOTE — PN
Physical Exam: 


SUBJECTIVE: Patient seen and examined. Patient did not respond well to Bipap 

and was put on high flow oxygen. Had a choking episode on water this am.








OBJECTIVE:





 Vital Signs











 Period  Temp  Pulse  Resp  BP Sys/Penaloza  Pulse Ox


 


 Last 24 Hr  97.5 F-100.8 F  71-96  13-32  112-134/57-78  86-96











GENERAL: The patient is awake, alert, and fully oriented, on high flow oxygen


HEAD: Normal with no signs of trauma.


EYES: PERRL, extraocular movements intact, sclera anicteric, conjunctiva clear. 

No ptosis. 


ENT: Ears normal, nares patent, oropharynx clear without exudates, moist mucous 


membranes.


NECK: supple. 


LUNGS: tachypneic, rhonchi and scattered creps 


HEART: Regular rate and rhythm, S1, S2 without murmur, rub or gallop.


ABDOMEN: Soft, nontender, nondistended, normoactive bowel sounds, no guarding, 

no 


rebound, no hepatosplenomegaly, no masses.


EXTREMITIES: 2+ pulses, warm, well-perfused, no edema. 


NEUROLOGICAL: Cranial nerves II through XII grossly intact. Normal speech, gait 

not 


observed.


PSYCH: Normal mood, normal affect.


SKIN: Warm, dry, normal turgor, no rashes or lesions noted














 Laboratory Results - last 24 hr











  12/27/17 12/27/17 12/27/17





  09:15 09:15 09:15


 


WBC  12.8 H  


 


RBC  4.13  


 


Hgb  11.7  


 


Hct  39.6  


 


MCV  95.8  


 


MCH  28.3  


 


MCHC  29.5 L  


 


RDW  16.5 H  


 


Plt Count  No Result Required.  


 


MPV  8.5  


 


Neutrophils %  85.0 H  


 


Lymphocytes %  5.7 L  


 


Monocytes %  9.2  


 


Eosinophils %  0.0  


 


Basophils %  0.1  


 


Platelet Estimate  Decreased  


 


Platelet Comment  Present  


 


PT with INR   12.70 H 


 


INR   1.12 


 


PTT (Actin FS)   40.1 H 


 


Puncture Site   


 


ABG pH   


 


ABG pCO2 at Pt Temp   


 


ABG pO2 at Pt Temp   


 


ABG HCO3   


 


ABG O2 Sat (Measured)   


 


ABG O2 Content   


 


ABG Base Excess   


 


Jeffy Test   


 


VBG pH    Np


 


POC VBG pCO2    No Result Required.


 


POC VBG pO2    No Result Required.


 


Mixed VBG HCO3    No Result Required.


 


Carboxyhemoglobin   


 


Methemoglobin   


 


O2 Delivery Device   


 


Oxygen Flow Rate   


 


Vent Mode   


 


Vent Rate   


 


Mechanical Rate   


 


Sodium   


 


Potassium   


 


Chloride   


 


Carbon Dioxide   


 


Anion Gap   


 


BUN   


 


Creatinine   


 


Creat Clearance w eGFR   


 


Random Glucose   


 


Lactic Acid   


 


Calcium   


 


Phosphorus   


 


Magnesium   


 


Total Bilirubin   


 


AST   


 


ALT   


 


Alkaline Phosphatase   


 


Creatine Kinase   


 


Troponin I   


 


B-Natriuretic Peptide   


 


Total Protein   


 


Albumin   


 


Urine Color   


 


Urine Appearance   


 


Urine pH   


 


Ur Specific Gravity   


 


Urine Protein   


 


Urine Glucose (UA)   


 


Urine Ketones   


 


Urine Blood   


 


Urine Nitrite   


 


Urine Bilirubin   


 


Urine Urobilinogen   


 


Ur Leukocyte Esterase   


 


Urine WBC (Auto)   


 


Urine RBC (Auto)   


 


Ur Epithelial Cells   


 


Urine Bacteria   


 


Urine Mucus   


 


Blood Type   


 


Antibody Screen   














  12/27/17 12/27/17 12/27/17





  09:15 09:15 09:15


 


WBC   


 


RBC   


 


Hgb   


 


Hct   


 


MCV   


 


MCH   


 


MCHC   


 


RDW   


 


Plt Count   


 


MPV   


 


Neutrophils %   


 


Lymphocytes %   


 


Monocytes %   


 


Eosinophils %   


 


Basophils %   


 


Platelet Estimate   


 


Platelet Comment   


 


PT with INR   


 


INR   


 


PTT (Actin FS)   


 


Puncture Site   


 


ABG pH   


 


ABG pCO2 at Pt Temp   


 


ABG pO2 at Pt Temp   


 


ABG HCO3   


 


ABG O2 Sat (Measured)   


 


ABG O2 Content   


 


ABG Base Excess   


 


Jeffy Test   


 


VBG pH   


 


POC VBG pCO2   


 


POC VBG pO2   


 


Mixed VBG HCO3   


 


Carboxyhemoglobin   


 


Methemoglobin   


 


O2 Delivery Device   


 


Oxygen Flow Rate   


 


Vent Mode   


 


Vent Rate   


 


Mechanical Rate   


 


Sodium  141  


 


Potassium  3.6  


 


Chloride  100  


 


Carbon Dioxide  34 H  


 


Anion Gap  7 L  


 


BUN  13  


 


Creatinine  0.4 L  


 


Creat Clearance w eGFR  > 60  


 


Random Glucose  129 H  


 


Lactic Acid   1.5 


 


Calcium  8.8  


 


Phosphorus   


 


Magnesium   


 


Total Bilirubin  0.4  


 


AST  14 L  


 


ALT  12  


 


Alkaline Phosphatase  96  


 


Creatine Kinase  36 L  


 


Troponin I  0.18 H  


 


B-Natriuretic Peptide   


 


Total Protein  6.5  


 


Albumin  2.6 L  


 


Urine Color   


 


Urine Appearance   


 


Urine pH   


 


Ur Specific Gravity   


 


Urine Protein   


 


Urine Glucose (UA)   


 


Urine Ketones   


 


Urine Blood   


 


Urine Nitrite   


 


Urine Bilirubin   


 


Urine Urobilinogen   


 


Ur Leukocyte Esterase   


 


Urine WBC (Auto)   


 


Urine RBC (Auto)   


 


Ur Epithelial Cells   


 


Urine Bacteria   


 


Urine Mucus   


 


Blood Type    A POSITIVE


 


Antibody Screen    Negative














  12/27/17 12/27/17 12/27/17





  10:25 18:00 18:00


 


WBC   


 


RBC   


 


Hgb   


 


Hct   


 


MCV   


 


MCH   


 


MCHC   


 


RDW   


 


Plt Count   


 


MPV   


 


Neutrophils %   


 


Lymphocytes %   


 


Monocytes %   


 


Eosinophils %   


 


Basophils %   


 


Platelet Estimate   


 


Platelet Comment   


 


PT with INR   


 


INR   


 


PTT (Actin FS)   


 


Puncture Site  Right brachial  


 


ABG pH  7.40  


 


ABG pCO2 at Pt Temp  55.6 H  


 


ABG pO2 at Pt Temp  63.3 L  


 


ABG HCO3  33.4 H  


 


ABG O2 Sat (Measured)  87.5 L  


 


ABG O2 Content  13.2 L  


 


ABG Base Excess  7.5 H  


 


Jeffy Test  Not applicable  


 


VBG pH   


 


POC VBG pCO2   


 


POC VBG pO2   


 


Mixed VBG HCO3   


 


Carboxyhemoglobin  1.4  


 


Methemoglobin  1.1  


 


O2 Delivery Device  Bipap  


 


Oxygen Flow Rate  50  


 


Vent Mode  S/t 12/6  


 


Vent Rate  10  


 


Mechanical Rate  Yes  


 


Sodium   


 


Potassium   


 


Chloride   


 


Carbon Dioxide   


 


Anion Gap   


 


BUN   


 


Creatinine   


 


Creat Clearance w eGFR   


 


Random Glucose   


 


Lactic Acid   


 


Calcium   


 


Phosphorus   


 


Magnesium   


 


Total Bilirubin   


 


AST   


 


ALT   


 


Alkaline Phosphatase   


 


Creatine Kinase   


 


Troponin I   0.12 H D 


 


B-Natriuretic Peptide    1599.52 H


 


Total Protein   


 


Albumin   


 


Urine Color   


 


Urine Appearance   


 


Urine pH   


 


Ur Specific Gravity   


 


Urine Protein   


 


Urine Glucose (UA)   


 


Urine Ketones   


 


Urine Blood   


 


Urine Nitrite   


 


Urine Bilirubin   


 


Urine Urobilinogen   


 


Ur Leukocyte Esterase   


 


Urine WBC (Auto)   


 


Urine RBC (Auto)   


 


Ur Epithelial Cells   


 


Urine Bacteria   


 


Urine Mucus   


 


Blood Type   


 


Antibody Screen   














  12/27/17 12/28/17 12/28/17





  18:08 05:10 05:10


 


WBC    14.7 H


 


RBC    3.72 L


 


Hgb    11.0 L


 


Hct    35.4


 


MCV    95.1


 


MCH    29.6


 


MCHC    31.1 L


 


RDW    16.4 H


 


Plt Count   


 


MPV   


 


Neutrophils %   


 


Lymphocytes %   


 


Monocytes %   


 


Eosinophils %   


 


Basophils %   


 


Platelet Estimate   


 


Platelet Comment   


 


PT with INR   


 


INR   


 


PTT (Actin FS)   


 


Puncture Site   


 


ABG pH   


 


ABG pCO2 at Pt Temp   


 


ABG pO2 at Pt Temp   


 


ABG HCO3   


 


ABG O2 Sat (Measured)   


 


ABG O2 Content   


 


ABG Base Excess   


 


Jeffy Test   


 


VBG pH   


 


POC VBG pCO2   


 


POC VBG pO2   


 


Mixed VBG HCO3   


 


Carboxyhemoglobin   


 


Methemoglobin   


 


O2 Delivery Device   


 


Oxygen Flow Rate   


 


Vent Mode   


 


Vent Rate   


 


Mechanical Rate   


 


Sodium   141 


 


Potassium   3.4 L 


 


Chloride   101 


 


Carbon Dioxide   35 H 


 


Anion Gap   5 L 


 


BUN   9  D 


 


Creatinine   0.2 L D 


 


Creat Clearance w eGFR   


 


Random Glucose   124 H 


 


Lactic Acid   


 


Calcium   8.3 L 


 


Phosphorus   2.0 L 


 


Magnesium   2.2 


 


Total Bilirubin   


 


AST   


 


ALT   


 


Alkaline Phosphatase   


 


Creatine Kinase   


 


Troponin I   


 


B-Natriuretic Peptide   


 


Total Protein   


 


Albumin   


 


Urine Color  Yellow  


 


Urine Appearance  Cloudy  


 


Urine pH  5.0  


 


Ur Specific Gravity  1.032  


 


Urine Protein  1+ H  


 


Urine Glucose (UA)  Negative  


 


Urine Ketones  Negative  


 


Urine Blood  Negative  


 


Urine Nitrite  Negative  


 


Urine Bilirubin  Negative  


 


Urine Urobilinogen  Negative  


 


Ur Leukocyte Esterase  Negative  


 


Urine WBC (Auto)  3  


 


Urine RBC (Auto)  7  


 


Ur Epithelial Cells  Rare  


 


Urine Bacteria  Rare  


 


Urine Mucus  Rare  


 


Blood Type   


 


Antibody Screen   








Active Medications











Generic Name Dose Route Start Last Admin





  Trade Name Freq  PRN Reason Stop Dose Admin


 


Acetaminophen  1,000 mg  12/27/17 13:29  





  Ofirmev Injection -  IVPB   





  Q6H PRN   





  FEVER OR PAIN   


 


Albuterol Sulfate  1 amp  12/27/17 12:43  





  Ventolin 0.083% Nebulizer Soln -  NEB   





  Q4H PRN   





  SHORT OF BREATH/WHEEZING   


 


Albuterol/Ipratropium  1 amp  12/27/17 18:00  12/28/17 05:44





  Duoneb -  NEB   1 amp





  QIDR LIV   Administration


 


Chlorhexidine Gluconate  1 applic  12/27/17 22:00  12/27/17 21:18





  Hibiclens For Decolonization -  TP   1 applic





  HS LIV   Administration


 


Heparin Sodium (Porcine)  5,000 unit  12/27/17 12:30  12/28/17 06:27





  Heparin -  SQ   5,000 unit





  TID LIV   Administration


 


Vancomycin HCl 1,000 mg/  250 mls @ 250 mls/hr  12/27/17 22:00  12/27/17 21:17





  Dextrose  IVPB   250 mls/hr





  BID LIV   Administration





  Protocol   


 


Piperacillin Sod/Tazobactam  100 mls @ 200 mls/hr  12/27/17 18:00  12/28/17 02:

01





  Sod 3.375 gm/ Dextrose  IVPB   200 mls/hr





  Q8H-IV LIV   Administration


 


Methylprednisolone Sodium Succinate  40 mg  12/27/17 13:30  12/27/17 14:12





  Solu-Medrol -  IVPUSH   40 mg





  DAILY LIV   Administration


 


Mupirocin  1 applic  12/27/17 22:00  12/27/17 21:17





  Bactroban Ointment (For Decolonization) -  NS  01/01/18 21:59  1 applic





  BID LIV   Administration


 


Pantoprazole Sodium  40 mg  12/27/17 15:15  12/27/17 15:56





  Protonix Iv  IVPUSH   40 mg





  DAILY LIV   Administration











ASSESSMENT/PLAN:





86 year old male with a PMHx of HTN, COPD (3L NC at night), Osteoarthritis, 

Dysphagia, PUD presented to the ER with acute hypoxic respiratory distress





Resp:


Acute hypoxic hypercapnic respiratory distress


due to possible PNA vs acute on chronic COPD.


currently saturating well on high flow oxygen.


on medrol 40mg Daily


supplemental oxygen to maintain spO2 >88%. 


pulmonary on board


wean to nasal cannular


monitor respiratory status





ID:


Sepsis due to PNA- afebrile. 


leukocytosis improving. 


on Vanco/zosyn day 2. 


Flu negative.


check legionella, strep. 


consult ID





Cardio:


HTN- currently normotensive. 


not on medications at home.


monitor. 





GI:


R/o poor swallow 


speech and swallow consult


PUD- no signs of bleeding. 


stress ulcer ppx





Prophylaxis:


DVT ppx- hep sq


GI prophylaxis








Dispo:


Currently DNR/DNI, 


no central line or pressors. 


For likely transfer to [facility] if stable on NC





Visit type





- Emergency Visit


Emergency Visit: Yes


ED Registration Date: 12/27/17


Care time: The patient presented to the Emergency Department on the above date 

and was hospitalized for further evaluation of their emergent condition.





- New Patient


This patient is new to me today: Yes


Date on this admission: 01/02/18





- Critical Care


Critical Care patient: Yes


Total Critical Care Time (in minutes): 35


Critical Care Statement: The care of this patient involved high complexity 

decision making to prevent further life threatening deterioration of the patient

's condition and/or to evaluate & treat vital organ system(s) failure or risk 

of failure.





- Discharge Referral


Referred to [facility] No

## 2017-12-28 NOTE — PN
Teaching Attending Note


Name of Resident: Chel Irby





ATTENDING PHYSICIAN STATEMENT





I saw and evaluated the patient.


I reviewed the resident's note and discussed the case with the resident.


I agree with the resident's findings and plan as documented.








SUBJECTIVE:


Patient seen and examined in the ICU.  Awake and responsive on HFOT.  


Reports some improvement in his breathing. 


Congested cough.





CXR: rotated, minimal improvement in congestion/infiltrates. 





 Intake & Output











 12/25/17 12/26/17 12/27/17 12/28/17





 23:59 23:59 23:59 23:59


 


Intake Total   550 950


 


Output Total   75 


 


Balance   475 950


 


Weight   219 lb 15.988 oz 164 lb 6 oz








 Last Vital Signs











Temp Pulse Resp BP Pulse Ox


 


 98.6 F   80   24   129/65   90 L


 


 12/28/17 12:00  12/28/17 12:19  12/28/17 12:00  12/28/17 12:00  12/28/17 12:19








Active Medications





Acetaminophen (Ofirmev Injection -)  1,000 mg IVPB Q6H PRN


   PRN Reason: FEVER OR PAIN


   Last Admin: 12/28/17 09:00 Dose:  1,000 mg


Albuterol Sulfate (Ventolin 0.083% Nebulizer Soln -)  1 amp NEB Q4H PRN


   PRN Reason: SHORT OF BREATH/WHEEZING


Albuterol/Ipratropium (Duoneb -)  1 amp NEB QIDR LIV


   Last Admin: 12/28/17 12:36 Dose:  1 amp


Chlorhexidine Gluconate (Hibiclens For Decolonization -)  1 applic TP HS LIV


   Last Admin: 12/27/17 21:18 Dose:  1 applic


Heparin Sodium (Porcine) (Heparin -)  5,000 unit SQ TID LIV


   Last Admin: 12/28/17 06:27 Dose:  5,000 unit


Vancomycin HCl 1,000 mg/ (Dextrose)  250 mls @ 250 mls/hr IVPB BID LIV


   PRN Reason: Protocol


   Last Admin: 12/28/17 10:04 Dose:  250 mls/hr


Piperacillin Sod/Tazobactam (Sod 3.375 gm/ Dextrose)  100 mls @ 200 mls/hr IVPB 

Q8H-IV LIV


   Last Admin: 12/28/17 10:04 Dose:  200 mls/hr


Methylprednisolone Sodium Succinate (Solu-Medrol -)  40 mg IVPUSH DAILY LIV


   Last Admin: 12/28/17 10:04 Dose:  40 mg


Mupirocin (Bactroban Ointment (For Decolonization) -)  1 applic NS BID [facility]


   Stop: 01/01/18 21:59


   Last Admin: 12/28/17 10:38 Dose:  1 applic


Pantoprazole Sodium (Protonix Iv)  40 mg IVPUSH DAILY [facility]


   Last Admin: 12/28/17 10:03 Dose:  40 mg








Constitutional: Yes: Mildly tachypneic 


Cardiovascular: Yes: Regular Rate and Rhythm, S1, S2


Respiratory: Yes: On HFOT, bibasilar crackles, mild expiratory wheeze 


Gastrointestinal: Yes: Normal Bowel Sounds, Soft.  No: Tenderness


Edema: No








Labs: 


 Laboratory Results - last 24 hr











  12/27/17 12/27/17 12/27/17





  09:15 09:15 09:15


 


WBC   


 


RBC   


 


Hgb   


 


Hct   


 


MCV   


 


MCH   


 


MCHC   


 


RDW   


 


Plt Count  No Result Required.  


 


Platelet Estimate  Decreased  


 


Platelet Comment  Present  


 


Puncture Site   


 


ABG pH   


 


ABG pCO2 at Pt Temp   


 


ABG pO2 at Pt Temp   


 


ABG HCO3   


 


ABG O2 Sat (Measured)   


 


ABG O2 Content   


 


ABG Base Excess   


 


Jeffy Test   


 


VBG pH   Np 


 


PEEP   


 


Pressure Support Vent   


 


Sodium   


 


Potassium   


 


Chloride   


 


Carbon Dioxide   


 


Anion Gap   


 


BUN   


 


Creatinine   


 


Random Glucose   


 


Lactic Acid    1.5


 


Calcium   


 


Phosphorus   


 


Magnesium   


 


Troponin I   


 


B-Natriuretic Peptide   


 


Urine Color   


 


Urine Appearance   


 


Urine pH   


 


Ur Specific Gravity   


 


Urine Protein   


 


Urine Glucose (UA)   


 


Urine Ketones   


 


Urine Blood   


 


Urine Nitrite   


 


Urine Bilirubin   


 


Urine Urobilinogen   


 


Ur Leukocyte Esterase   


 


Urine WBC (Auto)   


 


Urine RBC (Auto)   


 


Ur Epithelial Cells   


 


Urine Bacteria   


 


Urine Mucus   














  12/27/17 12/27/17 12/27/17





  18:00 18:00 18:08


 


WBC   


 


RBC   


 


Hgb   


 


Hct   


 


MCV   


 


MCH   


 


MCHC   


 


RDW   


 


Plt Count   


 


Platelet Estimate   


 


Platelet Comment   


 


Puncture Site   


 


ABG pH   


 


ABG pCO2 at Pt Temp   


 


ABG pO2 at Pt Temp   


 


ABG HCO3   


 


ABG O2 Sat (Measured)   


 


ABG O2 Content   


 


ABG Base Excess   


 


Jeffy Test   


 


VBG pH   


 


PEEP   


 


Pressure Support Vent   


 


Sodium   


 


Potassium   


 


Chloride   


 


Carbon Dioxide   


 


Anion Gap   


 


BUN   


 


Creatinine   


 


Random Glucose   


 


Lactic Acid   


 


Calcium   


 


Phosphorus   


 


Magnesium   


 


Troponin I  0.12 H D  


 


B-Natriuretic Peptide   1599.52 H 


 


Urine Color    Yellow


 


Urine Appearance    Cloudy


 


Urine pH    5.0


 


Ur Specific Gravity    1.032


 


Urine Protein    1+ H


 


Urine Glucose (UA)    Negative


 


Urine Ketones    Negative


 


Urine Blood    Negative


 


Urine Nitrite    Negative


 


Urine Bilirubin    Negative


 


Urine Urobilinogen    Negative


 


Ur Leukocyte Esterase    Negative


 


Urine WBC (Auto)    3


 


Urine RBC (Auto)    7


 


Ur Epithelial Cells    Rare


 


Urine Bacteria    Rare


 


Urine Mucus    Rare














  12/28/17 12/28/17 12/28/17





  05:10 05:10 09:30


 


WBC   14.7 H 


 


RBC   3.72 L 


 


Hgb   11.0 L 


 


Hct   35.4 


 


MCV   95.1 


 


MCH   29.6 


 


MCHC   31.1 L 


 


RDW   16.4 H 


 


Plt Count   


 


Platelet Estimate   


 


Platelet Comment   


 


Puncture Site    Right radial


 


ABG pH    7.42


 


ABG pCO2 at Pt Temp    53.1 H


 


ABG pO2 at Pt Temp    62.3 L


 


ABG HCO3    34.1 H


 


ABG O2 Sat (Measured)    92.7


 


ABG O2 Content    14.1 L


 


ABG Base Excess    8.6 H


 


Jeffy Test    Positive


 


VBG pH   


 


PEEP    5.0


 


Pressure Support Vent    300


 


Sodium  141  


 


Potassium  3.4 L  


 


Chloride  101  


 


Carbon Dioxide  35 H  


 


Anion Gap  5 L  


 


BUN  9  D  


 


Creatinine  0.2 L D  


 


Random Glucose  124 H  


 


Lactic Acid   


 


Calcium  8.3 L  


 


Phosphorus  2.0 L  


 


Magnesium  2.2  


 


Troponin I   


 


B-Natriuretic Peptide   


 


Urine Color   


 


Urine Appearance   


 


Urine pH   


 


Ur Specific Gravity   


 


Urine Protein   


 


Urine Glucose (UA)   


 


Urine Ketones   


 


Urine Blood   


 


Urine Nitrite   


 


Urine Bilirubin   


 


Urine Urobilinogen   


 


Ur Leukocyte Esterase   


 


Urine WBC (Auto)   


 


Urine RBC (Auto)   


 


Ur Epithelial Cells   


 


Urine Bacteria   


 


Urine Mucus   














Problem List


- Problems


(1) COPD (chronic obstructive pulmonary disease)


Code(s): J44.9 - CHRONIC OBSTRUCTIVE PULMONARY DISEASE, UNSPECIFIED   


Qualifiers: 


   COPD type: COPD with acute lower respiratory infection   Qualified Code(s): 

J44.0 - Chronic obstructive pulmonary disease with acute lower respiratory 

infection   





(2) Hypertension


Code(s): I10 - ESSENTIAL (PRIMARY) HYPERTENSION   





(3) Pneumonia


Code(s): J18.9 - PNEUMONIA, UNSPECIFIED ORGANISM   


Qualifiers: 


   Pneumonia type: due to unspecified organism   Laterality: left   Lung 

location: lower lobe of lung   Qualified Code(s): J18.1 - Lobar pneumonia, 

unspecified organism   





(4) Respiratory failure


Code(s): J96.90 - RESPIRATORY FAILURE, UNSP, UNSP W HYPOXIA OR HYPERCAPNIA   








Assessment/Plan


Hypoxemic respiratory failure


R/O PNA vs fluid overload 


Acute COPD exacerbation 


Troponin leak likely demand ischemia 


HTN


Recent GIB, now with stable Hgb











-f/u respiratory swab


-Check sputum if able


-ABX per ID 


-BD TX 


-Medrol  


-Wean HFOT 


-TTE 


-Monitor off IVF 


-trend troponin 


-LE dopplers


-heparin SubQ


-protonix for GI ppx (recent GIB)


-DNR/DNI/no invasive procedures as per prior GOC conversations








Dr Miner 


Critical care time spent in reviewing chart, evaluating patient and formulating 

plan - 40 minutes.

## 2017-12-29 LAB
ALBUMIN SERPL-MCNC: 2.2 G/DL (ref 3.4–5)
ALP SERPL-CCNC: 102 U/L (ref 45–117)
ALT SERPL-CCNC: 20 U/L (ref 12–78)
ANION GAP SERPL CALC-SCNC: 11 MMOL/L (ref 8–16)
ANION GAP SERPL CALC-SCNC: 9 MMOL/L (ref 8–16)
AST SERPL-CCNC: 34 U/L (ref 15–37)
BASOPHILS # BLD: 0.2 % (ref 0–2)
BILIRUB SERPL-MCNC: 0.5 MG/DL (ref 0.2–1)
BUN SERPL-MCNC: 11 MG/DL (ref 7–18)
BUN SERPL-MCNC: 11 MG/DL (ref 7–18)
CALCIUM SERPL-MCNC: 8.9 MG/DL (ref 8.5–10.1)
CALCIUM SERPL-MCNC: 8.9 MG/DL (ref 8.5–10.1)
CHLORIDE SERPL-SCNC: 97 MMOL/L (ref 98–107)
CHLORIDE SERPL-SCNC: 98 MMOL/L (ref 98–107)
CO2 SERPL-SCNC: 33 MMOL/L (ref 21–32)
CO2 SERPL-SCNC: 35 MMOL/L (ref 21–32)
CREAT SERPL-MCNC: 0.3 MG/DL (ref 0.7–1.3)
CREAT SERPL-MCNC: 0.5 MG/DL (ref 0.7–1.3)
DEPRECATED RDW RBC AUTO: 16.4 % (ref 11.9–15.9)
EOSINOPHIL # BLD: 0 % (ref 0–4.5)
GLUCOSE SERPL-MCNC: 234 MG/DL (ref 74–106)
GLUCOSE SERPL-MCNC: 94 MG/DL (ref 74–106)
HCT VFR BLD CALC: 36.2 % (ref 35.4–49)
HGB BLD-MCNC: 11.1 GM/DL (ref 11.7–16.9)
LYMPHOCYTES # BLD: 3.7 % (ref 8–40)
MAGNESIUM SERPL-MCNC: 2.1 MG/DL (ref 1.8–2.4)
MCH RBC QN AUTO: 28.6 PG (ref 25.7–33.7)
MCHC RBC AUTO-ENTMCNC: 30.6 G/DL (ref 32–35.9)
MCV RBC: 93.5 FL (ref 80–96)
MONOCYTES # BLD AUTO: 6.4 % (ref 3.8–10.2)
NEUTROPHILS # BLD: 89.7 % (ref 42.8–82.8)
PHOSPHATE SERPL-MCNC: 1.9 MG/DL (ref 2.5–4.9)
PLATELET # BLD AUTO: 278 K/MM3 (ref 134–434)
PMV BLD: 8.4 FL (ref 7.5–11.1)
POTASSIUM SERPLBLD-SCNC: 3.3 MMOL/L (ref 3.5–5.1)
POTASSIUM SERPLBLD-SCNC: 3.9 MMOL/L (ref 3.5–5.1)
PROT SERPL-MCNC: 6.1 G/DL (ref 6.4–8.2)
RBC # BLD AUTO: 3.87 M/MM3 (ref 4–5.6)
SODIUM SERPL-SCNC: 141 MMOL/L (ref 136–145)
SODIUM SERPL-SCNC: 142 MMOL/L (ref 136–145)
WBC # BLD AUTO: 16 K/MM3 (ref 4–10)

## 2017-12-29 RX ADMIN — HEPARIN SODIUM SCH UNIT: 5000 INJECTION, SOLUTION INTRAVENOUS; SUBCUTANEOUS at 05:10

## 2017-12-29 RX ADMIN — VANCOMYCIN HYDROCHLORIDE SCH MLS/HR: 1 INJECTION, POWDER, LYOPHILIZED, FOR SOLUTION INTRAVENOUS at 21:38

## 2017-12-29 RX ADMIN — PIPERACILLIN SODIUM,TAZOBACTAM SODIUM SCH MLS/HR: 3; .375 INJECTION, POWDER, FOR SOLUTION INTRAVENOUS at 09:20

## 2017-12-29 RX ADMIN — HEPARIN SODIUM SCH UNIT: 5000 INJECTION, SOLUTION INTRAVENOUS; SUBCUTANEOUS at 21:38

## 2017-12-29 RX ADMIN — MULTIVITAMIN TABLET SCH TAB: TABLET at 10:45

## 2017-12-29 RX ADMIN — METHYLPREDNISOLONE SODIUM SUCCINATE SCH MG: 40 INJECTION, POWDER, FOR SOLUTION INTRAMUSCULAR; INTRAVENOUS at 09:20

## 2017-12-29 RX ADMIN — PANTOPRAZOLE SODIUM SCH MG: 40 INJECTION, POWDER, FOR SOLUTION INTRAVENOUS at 09:21

## 2017-12-29 RX ADMIN — HEPARIN SODIUM SCH UNIT: 5000 INJECTION, SOLUTION INTRAVENOUS; SUBCUTANEOUS at 14:12

## 2017-12-29 RX ADMIN — HEPARIN SODIUM SCH: 5000 INJECTION, SOLUTION INTRAVENOUS; SUBCUTANEOUS at 00:30

## 2017-12-29 RX ADMIN — IPRATROPIUM BROMIDE AND ALBUTEROL SULFATE SCH AMP: .5; 3 SOLUTION RESPIRATORY (INHALATION) at 23:25

## 2017-12-29 RX ADMIN — PIPERACILLIN SODIUM,TAZOBACTAM SODIUM SCH MLS/HR: 3; .375 INJECTION, POWDER, FOR SOLUTION INTRAVENOUS at 17:33

## 2017-12-29 RX ADMIN — Medication SCH ML: at 17:33

## 2017-12-29 RX ADMIN — IPRATROPIUM BROMIDE AND ALBUTEROL SULFATE SCH AMP: .5; 3 SOLUTION RESPIRATORY (INHALATION) at 18:14

## 2017-12-29 RX ADMIN — VANCOMYCIN HYDROCHLORIDE SCH MLS/HR: 1 INJECTION, POWDER, LYOPHILIZED, FOR SOLUTION INTRAVENOUS at 10:45

## 2017-12-29 RX ADMIN — PIPERACILLIN SODIUM,TAZOBACTAM SODIUM SCH MLS/HR: 3; .375 INJECTION, POWDER, FOR SOLUTION INTRAVENOUS at 02:07

## 2017-12-29 RX ADMIN — IPRATROPIUM BROMIDE AND ALBUTEROL SULFATE SCH AMP: .5; 3 SOLUTION RESPIRATORY (INHALATION) at 06:35

## 2017-12-29 RX ADMIN — IPRATROPIUM BROMIDE AND ALBUTEROL SULFATE SCH AMP: .5; 3 SOLUTION RESPIRATORY (INHALATION) at 11:35

## 2017-12-29 NOTE — PN
Physical Exam: 


SUBJECTIVE: Pt alert, will nod yes and no per baseline and sometimes speak a 

few words. Denies any pain or problems. Overnight rapid response was called for 

hypoxic episode down to 84% SpO2 while on 3LNC. Pt was given one dose of Lasix 

40mg IVP and was placed onto VM 35% mask. Pt rapidly regained saturation 

afterward and had good urine output.








OBJECTIVE:





 Vital Signs











 Period  Temp  Pulse  Resp  BP Sys/Penaloza  Pulse Ox


 


 Last 24 Hr  97.4 F-97.8 F  80-98  20-30  121-142/69-93  83











GENERAL: NAD, awake, alert, laying in bed with VM on face.


HEENT: No JVD, sclera anicteric, EOMI, DESEAN


LUNGS: Rales noted in bilateral lower lobes with good air entry. No wheezes or 

rhonchi noted. No accessory muscle use. Currently on VM 35%


HEART: RRR, S1, S2 without murmur


ABDOMEN: Soft, nontender, nondistended, normoactive bowel sounds, no guarding, 

no hepatomegaly


EXTREMITIES: 2+ DP pulses, warm, well-perfused, no 1+ edema b/l


NEUROLOGICAL: Cranial nerves II through XII grossly intact. Normal speech, gait 

not observed.


SKIN: Warm, fragile skin, dry, no rashes noted














 Laboratory Results - last 24 hr











  12/28/17 12/28/17 12/29/17





  22:00 23:47 06:20


 


WBC    16.0 H


 


RBC    3.87 L


 


Hgb    11.1 L


 


Hct    36.2


 


MCV    93.5


 


MCH    28.6


 


MCHC    30.6 L


 


RDW    16.4 H


 


Plt Count    278  D


 


MPV    8.4


 


Neutrophils %    89.7 H


 


Lymphocytes %    3.7 L D


 


Monocytes %    6.4


 


Eosinophils %    0.0


 


Basophils %    0.2


 


Sodium  Cancelled  141 


 


Potassium  Cancelled  3.3 L 


 


Chloride  Cancelled  97 L 


 


Carbon Dioxide  Cancelled  35 H 


 


Anion Gap  Cancelled  9 


 


BUN  Cancelled  11  D 


 


Creatinine  Cancelled  0.5 L D 


 


Creat Clearance w eGFR   


 


Random Glucose  Cancelled  234 H D 


 


Calcium  Cancelled  8.9 


 


Phosphorus   


 


Magnesium   


 


Total Bilirubin   


 


AST   


 


ALT   


 


Alkaline Phosphatase   


 


Creatine Kinase  Cancelled  18 L 


 


Troponin I  Cancelled  0.04  D 


 


Total Protein   


 


Albumin   














  12/29/17





  06:20


 


WBC 


 


RBC 


 


Hgb 


 


Hct 


 


MCV 


 


MCH 


 


MCHC 


 


RDW 


 


Plt Count 


 


MPV 


 


Neutrophils % 


 


Lymphocytes % 


 


Monocytes % 


 


Eosinophils % 


 


Basophils % 


 


Sodium  142


 


Potassium  3.9


 


Chloride  98


 


Carbon Dioxide  33 H


 


Anion Gap  11


 


BUN  11


 


Creatinine  0.3 L D


 


Creat Clearance w eGFR  > 60


 


Random Glucose  94  D


 


Calcium  8.9


 


Phosphorus  1.9 L


 


Magnesium  2.1


 


Total Bilirubin  0.5  D


 


AST  34  D


 


ALT  20  D


 


Alkaline Phosphatase  102


 


Creatine Kinase 


 


Troponin I 


 


Total Protein  6.1 L


 


Albumin  2.2 L








Active Medications











Generic Name Dose Route Start Last Admin





  Trade Name Freq  PRN Reason Stop Dose Admin


 


Acetaminophen  1,000 mg  12/28/17 16:51  





  Ofirmev Injection -  IVPB   





  Q6H PRN   





  FEVER OR PAIN   


 


Albuterol Sulfate  1 amp  12/28/17 16:51  





  Ventolin 0.083% Nebulizer Soln -  NEB   





  Q4H PRN   





  SHORT OF BREATH/WHEEZING   


 


Albuterol/Ipratropium  1 amp  12/28/17 18:00  12/29/17 11:35





  Duoneb -  NEB   1 amp





  QIDR LIV   Administration


 


Amino Acids  30 ml  12/29/17 17:30  





  Prosource No Carb Liquid Pkt  PO   





  BID@0800,1730 LIV   


 


Heparin Sodium (Porcine)  5,000 unit  12/28/17 22:00  12/29/17 14:12





  Heparin -  SQ   5,000 unit





  TID LIV   Administration


 


Piperacillin Sod/Tazobactam  100 mls @ 200 mls/hr  12/28/17 18:00  12/29/17 09:

20





  Sod 3.375 gm/ Dextrose  IVPB   200 mls/hr





  Q8H-IV LIV   Administration


 


Vancomycin HCl 1,000 mg/  250 mls @ 166.667 mls/hr  12/28/17 22:00  12/29/17 10:

45





  Dextrose  IVPB   166.667 mls/hr





  BID LIV   Administration





  Protocol   


 


Methylprednisolone Sodium Succinate  40 mg  12/29/17 10:00  12/29/17 09:20





  Solu-Medrol -  IVPUSH   40 mg





  DAILY LIV   Administration


 


Multivitamins/Minerals/Vitamin C  1 tab  12/29/17 10:00  12/29/17 10:45





  Tab-A-Vit -  PO   1 tab





  DAILY LIV   Administration


 


Pantoprazole Sodium  40 mg  12/29/17 10:00  12/29/17 09:21





  Protonix Iv  IVPUSH   40 mg





  DAILY LIV   Administration











ASSESSMENT/PLAN:


87yo M with h/o COPD (baseline 3LNC at night), HTN, OA who was found to acute 

hypoxic hypercapnic distress. 





1) Acute hypoxic hypercapnic respiratory distress


   --2/2 to PNA most likely with component of COPD


   --Lasix given last night


      --One more dose of Lasix 40mg IVP 


   --Continue Solumedrol 40mg IVP qDaily


      --Will begin to transition to oral tomorrow


   --Maintain SpO2 >88-90% on VM 35%


   --Pulmonology on board





2) Sepsis 2/2 to PNA


   --Afebrile


   --WBC noted to increase; 2/2 to steroids


   --Suspect aspiration


      --S&S evaluation noted, but pt currently refusing MBS (will reassess later

)


   --Zosyn Day 3





3) HTN


   --Continue to hold medications due to being normotensive


   --Monitor BP and if elevation occurs can start to add on regiment





FEN:


   Fluids: Avoid currently due to hypervolemia


   Electrolyte abnormalities: Hypophosphatemia (repleted with phos packet)


   Nutrition: 





PPX:


   DVT - Heparin SQ TID





Dispo: Continue M/S


Case discussed with Dr. Johnny Davila, DO - Internal Medicine PGY-1








Visit type





- Emergency Visit


Emergency Visit: No





- New Patient


This patient is new to me today: No





- Critical Care


Critical Care patient: No

## 2017-12-29 NOTE — PN
Progress Note, Physician


History of Present Illness: 





PULMONARY





  AWAKE,NON-VERBAL,CONGESTED ,MILDLY DYSPNEIC





- Current Medication List


Current Medications: 


Active Medications





Acetaminophen (Ofirmev Injection -)  1,000 mg IVPB Q6H PRN


   PRN Reason: FEVER OR PAIN


Albuterol Sulfate (Ventolin 0.083% Nebulizer Soln -)  1 amp NEB Q4H PRN


   PRN Reason: SHORT OF BREATH/WHEEZING


Albuterol/Ipratropium (Duoneb -)  1 amp NEB QIDR [facility]


   Last Admin: 12/29/17 11:35 Dose:  1 amp


Amino Acids (Prosource No Carb Liquid Pkt)  30 ml PO BID@0800,1730 [facility]


Heparin Sodium (Porcine) (Heparin -)  5,000 unit SQ TID [facility]


   Last Admin: 12/29/17 05:10 Dose:  5,000 unit


Piperacillin Sod/Tazobactam (Sod 3.375 gm/ Dextrose)  100 mls @ 200 mls/hr IVPB 

Q8H-IV [facility]


   Last Admin: 12/29/17 09:20 Dose:  200 mls/hr


Vancomycin HCl 1,000 mg/ (Dextrose)  250 mls @ 166.667 mls/hr IVPB BID LIV


   PRN Reason: Protocol


   Last Admin: 12/29/17 10:45 Dose:  166.667 mls/hr


Methylprednisolone Sodium Succinate (Solu-Medrol -)  40 mg IVPUSH DAILY [facility]


   Last Admin: 12/29/17 09:20 Dose:  40 mg


Multivitamins/Minerals/Vitamin C (Tab-A-Vit -)  1 tab PO DAILY [facility]


   Last Admin: 12/29/17 10:45 Dose:  1 tab


Pantoprazole Sodium (Protonix Iv)  40 mg IVPUSH DAILY [facility]


   Last Admin: 12/29/17 09:21 Dose:  40 mg











- Objective


Vital Signs: 


 Vital Signs











Temperature  97.8 F   12/29/17 09:33


 


Pulse Rate  98 H  12/29/17 09:33


 


Respiratory Rate  20   12/29/17 09:33


 


Blood Pressure  142/93   12/29/17 09:33


 


O2 Sat by Pulse Oximetry (%)  83 L  12/28/17 20:00











Constitutional: Yes: Well Nourished, Calm, Other (DYSPNEIC)


Eyes: Yes: WNL


HENT: Yes: WNL


Neck: Yes: WNL


Cardiovascular: Yes: Regular Rate and Rhythm, S1, S2


Respiratory: Yes: Rhonchi (SCATTERED GUNJAN RHONCHI)


Gastrointestinal: Yes: Normal Bowel Sounds, Soft


Extremities: Yes: WNL


Edema: No


Labs: 


 CBC, BMP





 12/29/17 06:20 





 12/29/17 06:20 





 INR, PTT











INR  1.12  (0.82-1.09)   12/27/17  09:15    














- ....Imaging


Chest X-ray: Report Reviewed, Image Reviewed





Assessment/Plan








Problem List


- Problems


(1) COPD (chronic obstructive pulmonary disease)


Code(s): J44.9 - CHRONIC OBSTRUCTIVE PULMONARY DISEASE, UNSPECIFIED   


Qualifiers: 


   COPD type: COPD with acute lower respiratory infection   Qualified Code(s): 

J44.0 - Chronic obstructive pulmonary disease with acute lower respiratory 

infection   





(2) Hypertension


Code(s): I10 - ESSENTIAL (PRIMARY) HYPERTENSION   





(3) Pneumonia


Code(s): J18.9 - PNEUMONIA, UNSPECIFIED ORGANISM   


Qualifiers: 


   Pneumonia type: due to unspecified organism   Laterality: left   Lung 

location: lower lobe of lung   Qualified Code(s): J18.1 - Lobar pneumonia, 

unspecified organism   





(4) Respiratory failure


Code(s): J96.90 - RESPIRATORY FAILURE, UNSP, UNSP W HYPOXIA OR HYPERCAPNIA   








Assessment/Plan


Hypoxemic respiratory failure


R/O PNA vs fluid overload 


Acute COPD exacerbation 


Troponin leak likely demand ischemia 


HTN


Recent GIB, now with stable Hgb














-ABX per ID 


-BD TX 


-Medrol  


-TTE 


-trend troponin 


-heparin SubQ


-protonix for GI ppx (recent GIB)


-DNR/DNI/no invasive procedures as per prior GOC conversations





 DR BONNER

## 2017-12-29 NOTE — PN
Teaching Attending Note


Name of Resident: Earl Davila





ATTENDING PHYSICIAN STATEMENT





I saw and evaluated the patient.


I reviewed the resident's note and discussed the case with the resident.


I agree with the resident's findings and plan as documented.








SUBJECTIVE:offers no complaints. RRT called last night for acute hypoxic 

respiratory distress and found to have crackles on exam. now improved. rigo Cp

, SOB, fever, chills, cough








OBJECTIVE:


 Last Vital Signs











Temp Pulse Resp BP Pulse Ox


 


 97.8 F   98 H  20   142/93   83 L


 


 12/29/17 09:33  12/29/17 09:33  12/29/17 09:33  12/29/17 09:33  12/28/17 20:00











General NAD, A&O x2 (self and location)


CV S1 S2 +


Lungs crackles B/L bases


Abdomen soft NT/ND


Extremities no pedal edema





ASSESSMENT AND PLAN:


86 year old male with a PMHx of HTN, COPD (3L NC at night), Osteoarthritis, 

Dysphagia, PUD presented to the ER with acute hypoxic respiratory distress


1. Acute hypoxic hypercapnic respiratory distress- due to possible PNA vs acute 

on chronic COPD. episode of hypoxia. improved with lasix IV. will give 

additional dose of Lasix IV. monitor respiratory status. will cont Medrol 40mg 

today and transition po if improved. currently 90% on 40% face mask. 

Supplemental oxygen to maintain spO2 >88%. pulmonary on board


2. Sepsis due to PNA- afebrile. leukocytosis trending up likely due to 

steroids. concern for aspiration. swallow therapist evaluated but pt refusing 

MBS. on Vanco/zosyn day 3. check vanco trough. Flu and legonella negative. ID 

on board


3. Hypophosphatemia- neutraphos x2


4. HTN- currently normotensive. not on medications at home. cont to monitor. 


5. PUD- no sings of bleeding. stress ulcer ppx


6. DVT ppx- hep sq


7. family decided to make status DNR/DNI, no central line or pressors. 


8. d/c leatha

## 2017-12-29 NOTE — PN
Progress Note (short form)





- Note


Progress Note: 





more dyspneic today





 Vital Signs











 Period  Temp  Pulse  Resp  BP Sys/Penaloza  Pulse Ox


 


 Last 24 Hr  97.4 F-98.8 F  71-98  20-30  119-142/58-93  83-90








cor-rrr


lungs bibasilar crackles


abd soft,nt


ext no edema








 


cxray-unchanged, ?new right effusion





 CBC, BMP





 12/29/17 06:20 





 12/29/17 06:20 


 Microbiology





12/27/17 09:15   Blood - Peripheral Venous   Blood Culture - Preliminary


                            NO GROWTH OBTAINED AFTER 48 HOURS, INCUBATION TO 

CONTINUE


                            FOR 3 DAYS.


12/27/17 09:15   Blood - Peripheral Venous   Blood Culture - Preliminary


                            NO GROWTH OBTAINED AFTER 48 HOURS, INCUBATION TO 

CONTINUE


                            FOR 3 DAYS.


12/27/17 17:30   Nasopharyngeal Swab   Influenza Types A,B Antigen (JAISON) - Final


12/27/17 17:30   Nasopharyngeal Swab    - Final











a/p


i


pneumonia (HAP)


COPD


?CHF-consider echo








vanco/zosyn to continue 


f/u cultures


influenza screen negative


legionella/pneumococcal urinary antigens-reorder


check vanco trough


suspect leukocytosis may be partially due to steroids

















Problem List





- Problems


(1) Pneumonia


Code(s): J18.9 - PNEUMONIA, UNSPECIFIED ORGANISM   


Qualifiers: 


   Pneumonia type: due to unspecified organism   Laterality: left   Lung 

location: lower lobe of lung   Qualified Code(s): J18.1 - Lobar pneumonia, 

unspecified organism   





(2) Respiratory failure


Code(s): J96.90 - RESPIRATORY FAILURE, UNSP, UNSP W HYPOXIA OR HYPERCAPNIA   





(3) COPD (chronic obstructive pulmonary disease)


Code(s): J44.9 - CHRONIC OBSTRUCTIVE PULMONARY DISEASE, UNSPECIFIED   


Qualifiers: 


   COPD type: COPD with acute lower respiratory infection   Qualified Code(s): 

J44.0 - Chronic obstructive pulmonary disease with acute lower respiratory 

infection

## 2017-12-29 NOTE — PN
Progress Note, SLP





- Note


Progress Note: 





RR yesterday. Now on telemetry. On pureed diet and nectar thick liquid. Cup 

with thin water and pitcher noted at bedside. Pt reports he had thin water last 

night but not today. On VM. dyspneic with reduced respiratory support for 

speech purposes and dysphonia. CXR noted.


 Selected Entries











  12/29/17 12/29/17 12/29/17





  02:38 06:04 09:33


 


Temperature 97.8 F 97.5 F L 97.8 F








 Laboratory Tests











  12/27/17 12/28/17 12/29/17





  09:15 05:10 06:20


 


WBC  12.8 H  14.7 H  16.0 H








MBS indicated to further evaluate swallowing function and r/o silent aspiration 

on PO intake. Pt coughs responsively on thin liquid but not nectar/puree.





Pt with limited cooperation, however, oriented x 3 for me. He is refusing MBS 

for me. "No tests!" If he changes his mind, next date MBS are done is 1/2/18.





If continued aspiration is suspected, consider thick liquid on a tsp only, 

possibly downgrade to honey/NPO, pending on pulmonary status.

## 2017-12-30 LAB
ALBUMIN SERPL-MCNC: 2.3 G/DL (ref 3.4–5)
ALP SERPL-CCNC: 114 U/L (ref 45–117)
ALT SERPL-CCNC: 55 U/L (ref 12–78)
ANION GAP SERPL CALC-SCNC: 8 MMOL/L (ref 8–16)
AST SERPL-CCNC: 77 U/L (ref 15–37)
BILIRUB SERPL-MCNC: 0.7 MG/DL (ref 0.2–1)
BUN SERPL-MCNC: 10 MG/DL (ref 7–18)
CALCIUM SERPL-MCNC: 8.6 MG/DL (ref 8.5–10.1)
CHLORIDE SERPL-SCNC: 92 MMOL/L (ref 98–107)
CO2 SERPL-SCNC: 38 MMOL/L (ref 21–32)
CREAT SERPL-MCNC: 0.4 MG/DL (ref 0.7–1.3)
DEPRECATED RDW RBC AUTO: 16.6 % (ref 11.9–15.9)
GLUCOSE SERPL-MCNC: 143 MG/DL (ref 74–106)
HCT VFR BLD CALC: 38.1 % (ref 35.4–49)
HGB BLD-MCNC: 11.6 GM/DL (ref 11.7–16.9)
MCH RBC QN AUTO: 28.4 PG (ref 25.7–33.7)
MCHC RBC AUTO-ENTMCNC: 30.5 G/DL (ref 32–35.9)
MCV RBC: 93.1 FL (ref 80–96)
PLATELET # BLD AUTO: 285 K/MM3 (ref 134–434)
PMV BLD: 9 FL (ref 7.5–11.1)
POTASSIUM SERPLBLD-SCNC: 3.4 MMOL/L (ref 3.5–5.1)
PROT SERPL-MCNC: 6.4 G/DL (ref 6.4–8.2)
RBC # BLD AUTO: 4.1 M/MM3 (ref 4–5.6)
SODIUM SERPL-SCNC: 138 MMOL/L (ref 136–145)
WBC # BLD AUTO: 13.8 K/MM3 (ref 4–10)

## 2017-12-30 RX ADMIN — MULTIVITAMIN TABLET SCH TAB: TABLET at 10:12

## 2017-12-30 RX ADMIN — IPRATROPIUM BROMIDE AND ALBUTEROL SULFATE SCH AMP: .5; 3 SOLUTION RESPIRATORY (INHALATION) at 18:46

## 2017-12-30 RX ADMIN — PIPERACILLIN SODIUM,TAZOBACTAM SODIUM SCH MLS/HR: 3; .375 INJECTION, POWDER, FOR SOLUTION INTRAVENOUS at 17:30

## 2017-12-30 RX ADMIN — HEPARIN SODIUM SCH UNIT: 5000 INJECTION, SOLUTION INTRAVENOUS; SUBCUTANEOUS at 21:23

## 2017-12-30 RX ADMIN — PIPERACILLIN SODIUM,TAZOBACTAM SODIUM SCH MLS/HR: 3; .375 INJECTION, POWDER, FOR SOLUTION INTRAVENOUS at 10:12

## 2017-12-30 RX ADMIN — IPRATROPIUM BROMIDE AND ALBUTEROL SULFATE SCH AMP: .5; 3 SOLUTION RESPIRATORY (INHALATION) at 04:55

## 2017-12-30 RX ADMIN — IPRATROPIUM BROMIDE AND ALBUTEROL SULFATE SCH AMP: .5; 3 SOLUTION RESPIRATORY (INHALATION) at 23:08

## 2017-12-30 RX ADMIN — HEPARIN SODIUM SCH UNIT: 5000 INJECTION, SOLUTION INTRAVENOUS; SUBCUTANEOUS at 05:11

## 2017-12-30 RX ADMIN — PANTOPRAZOLE SODIUM SCH MG: 40 INJECTION, POWDER, FOR SOLUTION INTRAVENOUS at 10:12

## 2017-12-30 RX ADMIN — METHYLPREDNISOLONE SODIUM SUCCINATE SCH MG: 40 INJECTION, POWDER, FOR SOLUTION INTRAMUSCULAR; INTRAVENOUS at 10:12

## 2017-12-30 RX ADMIN — Medication SCH ML: at 17:30

## 2017-12-30 RX ADMIN — HEPARIN SODIUM SCH UNIT: 5000 INJECTION, SOLUTION INTRAVENOUS; SUBCUTANEOUS at 14:37

## 2017-12-30 RX ADMIN — IPRATROPIUM BROMIDE AND ALBUTEROL SULFATE SCH AMP: .5; 3 SOLUTION RESPIRATORY (INHALATION) at 11:55

## 2017-12-30 RX ADMIN — Medication SCH ML: at 10:12

## 2017-12-30 RX ADMIN — ALBUTEROL SULFATE PRN AMP: 2.5 SOLUTION RESPIRATORY (INHALATION) at 02:16

## 2017-12-30 RX ADMIN — VANCOMYCIN HYDROCHLORIDE SCH MLS/HR: 1 INJECTION, POWDER, LYOPHILIZED, FOR SOLUTION INTRAVENOUS at 11:57

## 2017-12-30 RX ADMIN — ALBUTEROL SULFATE PRN AMP: 2.5 SOLUTION RESPIRATORY (INHALATION) at 21:04

## 2017-12-30 RX ADMIN — PIPERACILLIN SODIUM,TAZOBACTAM SODIUM SCH MLS/HR: 3; .375 INJECTION, POWDER, FOR SOLUTION INTRAVENOUS at 01:31

## 2017-12-30 NOTE — PN
Progress Note (short form)





- Note


Progress Note: 


Remains mildy tachypneic at rest on 5 L NC O@. 


Congested cough is noted. 


 Intake & Output











 12/27/17 12/28/17 12/29/17 12/30/17





 23:59 23:59 23:59 23:59


 


Intake Total 550 1250 640 50


 


Output Total 75   1000


 


Balance 475 1250 640 -950


 


Weight 219 lb 15.988 oz 164 lb 6 oz  








 Last Vital Signs











Temp Pulse Resp BP Pulse Ox


 


 97.7 F   91 H  20   117/72   91 L


 


 12/30/17 10:00  12/30/17 10:00  12/30/17 10:00  12/30/17 10:00  12/29/17 21:00








Active Medications





Acetaminophen (Ofirmev Injection -)  1,000 mg IVPB Q6H PRN


   PRN Reason: FEVER OR PAIN


Albuterol Sulfate (Ventolin 0.083% Nebulizer Soln -)  1 amp NEB Q4H PRN


   PRN Reason: SHORT OF BREATH/WHEEZING


   Last Admin: 12/30/17 02:16 Dose:  1 amp


Albuterol/Ipratropium (Duoneb -)  1 amp NEB QIDR [facility]


   Last Admin: 12/30/17 04:55 Dose:  1 amp


Amino Acids (Prosource No Carb Liquid Pkt)  30 ml PO BID@0800,1730 [facility]


   Last Admin: 12/30/17 10:12 Dose:  30 ml


Heparin Sodium (Porcine) (Heparin -)  5,000 unit SQ TID [facility]


   Last Admin: 12/30/17 05:11 Dose:  5,000 unit


Piperacillin Sod/Tazobactam (Sod 3.375 gm/ Dextrose)  100 mls @ 200 mls/hr IVPB 

Q8H-IV [facility]


   Last Admin: 12/30/17 10:12 Dose:  200 mls/hr


Vancomycin HCl 1,000 mg/ (Dextrose)  250 mls @ 166.667 mls/hr IVPB BID [facility]


   PRN Reason: Protocol


   Last Admin: 12/29/17 21:38 Dose:  166.667 mls/hr


Methylprednisolone Sodium Succinate (Solu-Medrol -)  40 mg IVPUSH DAILY [facility]


   Last Admin: 12/30/17 10:12 Dose:  40 mg


Multivitamins/Minerals/Vitamin C (Tab-A-Vit -)  1 tab PO DAILY [facility]


   Last Admin: 12/30/17 10:12 Dose:  1 tab


Pantoprazole Sodium (Protonix Iv)  40 mg IVPUSH DAILY [facility]


   Last Admin: 12/30/17 10:12 Dose:  40 mg











Constitutional: Yes: Mildly tachypneic at rest 


Eyes: Yes: WNL


HENT: Yes: WNL


Neck: Yes: WNL


Cardiovascular: Yes: Regular Rate and Rhythm, S1, S2


Respiratory: Yes: Bilateral Rhonchi, no wheeze 


Gastrointestinal: Yes: Normal Bowel Sounds, Soft


Extremities: Yes: WNL


Edema: No


Labs: 


 


 Laboratory Results - last 24 hr











  12/30/17 12/30/17 12/30/17





  06:30 06:30 09:15


 


WBC  13.8 H  


 


RBC  4.10  


 


Hgb  11.6 L  


 


Hct  38.1  


 


MCV  93.1  


 


MCH  28.4  


 


MCHC  30.5 L  


 


RDW  16.6 H  


 


Plt Count  285  


 


MPV  9.0  


 


Sodium   138 


 


Potassium   3.4 L 


 


Chloride   92 L 


 


Carbon Dioxide   38 H 


 


Anion Gap   8 


 


BUN   10 


 


Creatinine   0.4 L D 


 


Creat Clearance w eGFR   > 60 


 


Random Glucose   143 H D 


 


Calcium   8.6 


 


Total Bilirubin   0.7  D 


 


AST   77 H D 


 


ALT   55  D 


 


Alkaline Phosphatase   114 


 


Total Protein   6.4 


 


Albumin   2.3 L 


 


Vancomycin Pre-Dose    17.728 H*











Assessment/Plan


Problem List


- Problems


(1) COPD (chronic obstructive pulmonary disease)


Code(s): J44.9 - CHRONIC OBSTRUCTIVE PULMONARY DISEASE, UNSPECIFIED   


Qualifiers: 


   COPD type: COPD with acute lower respiratory infection   Qualified Code(s): 

J44.0 - Chronic obstructive pulmonary disease with acute lower respiratory 

infection   





(2) Hypertension


Code(s): I10 - ESSENTIAL (PRIMARY) HYPERTENSION   





(3) Pneumonia


Code(s): J18.9 - PNEUMONIA, UNSPECIFIED ORGANISM   


Qualifiers: 


   Pneumonia type: due to unspecified organism   Laterality: left   Lung 

location: lower lobe of lung   Qualified Code(s): J18.1 - Lobar pneumonia, 

unspecified organism   





(4) Respiratory failure


Code(s): J96.90 - RESPIRATORY FAILURE, UNSP, UNSP W HYPOXIA OR HYPERCAPNIA   








Assessment/Plan


Hypoxemic respiratory failure


R/O PNA vs fluid overload 


Acute COPD exacerbation 


Troponin leak likely demand ischemia 


HTN


Recent GIB, now with stable Hgb








-ABX per ID 


-BD TX 


-Medrol  


-heparin SubQ


-protonix for GI ppx (recent GIB)


-DNR/DNI/no invasive procedures as per prior GOC conversations





Dr Miner

## 2017-12-30 NOTE — PN
Progress Note (short form)





- Note


Progress Note: 


less sob


congested cough





 


 Vital Signs











 Period  Temp  Pulse  Resp  BP Sys/Penaloza  Pulse Ox


 


 Last 24 Hr  97.3 F-98.2 F  81-96  20-20  116-146/71-86  91








cor-rrr


lungs bilateral rhonchi


abd soft,nt


ext no edema





 CBC, BMP





 12/30/17 06:30 





 12/30/17 06:30 





 Microbiology





12/27/17 22:52   Urine - Urine Clean Catch   Urine Culture - Final


                            NO GROWTH OBTAINED


12/27/17 09:15   Blood - Peripheral Venous   Blood Culture - Preliminary


                            NO GROWTH OBTAINED AFTER 72 HOURS, INCUBATION TO 

CONTINUE


                            FOR 2 DAYS.


12/27/17 09:15   Blood - Peripheral Venous   Blood Culture - Preliminary


                            NO GROWTH OBTAINED AFTER 72 HOURS, INCUBATION TO 

CONTINUE


                            FOR 2 DAYS.


12/28/17 18:15   Urine For Antigen Detection   Legionella Antigen - Final


12/28/17 18:15   Urine For Antigen Detection   Streptococcus pneumoniae Antigen 

(M - Final


12/27/17 17:30   Nasopharyngeal Swab   Influenza Types A,B Antigen (JAISON) - Final


12/27/17 17:30   Nasopharyngeal Swab    - Final





vanco trough 17











a/p


i


pneumonia (HAP)


COPD


?CHF-c








vanco/zosyn to continue , decrease vanco to daily


f/u cultures


influenza screen negative

















Problem List





- Problems


(1) Pneumonia


Code(s): J18.9 - PNEUMONIA, UNSPECIFIED ORGANISM   


Qualifiers: 


   Pneumonia type: due to unspecified organism   Laterality: left   Lung 

location: lower lobe of lung   Qualified Code(s): J18.1 - Lobar pneumonia, 

unspecified organism   





(2) Respiratory failure


Code(s): J96.90 - RESPIRATORY FAILURE, UNSP, UNSP W HYPOXIA OR HYPERCAPNIA   





(3) COPD (chronic obstructive pulmonary disease)


Code(s): J44.9 - CHRONIC OBSTRUCTIVE PULMONARY DISEASE, UNSPECIFIED   


Qualifiers: 


   COPD type: COPD with acute lower respiratory infection   Qualified Code(s): 

J44.0 - Chronic obstructive pulmonary disease with acute lower respiratory 

infection

## 2017-12-30 NOTE — EKG
Test Reason : 

Blood Pressure : ***/*** mmHG

Vent. Rate : 090 BPM     Atrial Rate : 090 BPM

   P-R Int : 168 ms          QRS Dur : 150 ms

    QT Int : 402 ms       P-R-T Axes : 038 072 026 degrees

   QTc Int : 491 ms

 

SINUS RHYTHM WITH OCCASIONAL PREMATURE VENTRICULAR COMPLEXES AND 

PREMATURE ATRIAL COMPLEXES

RIGHT BUNDLE BRANCH BLOCK

ABNORMAL ECG

WHEN COMPARED WITH ECG OF 27-DEC-2017 08:35,

PREMATURE VENTRICULAR COMPLEXES ARE NOW PRESENT

BASELINE ARTIFACT

Confirmed by SHAHRIAR BLACK, CARRIE ([id number]) on 12/30/2017 11:19:28 AM

 

Referred By:             Confirmed By:CARRIE BESS MD

## 2017-12-31 LAB
ANION GAP SERPL CALC-SCNC: 9 MMOL/L (ref 8–16)
BASOPHILS # BLD: 0.2 % (ref 0–2)
BUN SERPL-MCNC: 17 MG/DL (ref 7–18)
CALCIUM SERPL-MCNC: 8.9 MG/DL (ref 8.5–10.1)
CHLORIDE SERPL-SCNC: 90 MMOL/L (ref 98–107)
CO2 SERPL-SCNC: 39 MMOL/L (ref 21–32)
CREAT SERPL-MCNC: 0.4 MG/DL (ref 0.7–1.3)
DEPRECATED RDW RBC AUTO: 16.8 % (ref 11.9–15.9)
EOSINOPHIL # BLD: 0 % (ref 0–4.5)
GLUCOSE SERPL-MCNC: 105 MG/DL (ref 74–106)
HCT VFR BLD CALC: 39.9 % (ref 35.4–49)
HGB BLD-MCNC: 12.2 GM/DL (ref 11.7–16.9)
LYMPHOCYTES # BLD: 8.3 % (ref 8–40)
MAGNESIUM SERPL-MCNC: 2 MG/DL (ref 1.8–2.4)
MCH RBC QN AUTO: 28.5 PG (ref 25.7–33.7)
MCHC RBC AUTO-ENTMCNC: 30.7 G/DL (ref 32–35.9)
MCV RBC: 92.9 FL (ref 80–96)
MONOCYTES # BLD AUTO: 8.9 % (ref 3.8–10.2)
NEUTROPHILS # BLD: 82.6 % (ref 42.8–82.8)
PHOSPHATE SERPL-MCNC: 2.6 MG/DL (ref 2.5–4.9)
PLATELET # BLD AUTO: 381 K/MM3 (ref 134–434)
PMV BLD: 8.6 FL (ref 7.5–11.1)
POTASSIUM SERPLBLD-SCNC: 3.3 MMOL/L (ref 3.5–5.1)
RBC # BLD AUTO: 4.3 M/MM3 (ref 4–5.6)
SODIUM SERPL-SCNC: 138 MMOL/L (ref 136–145)
WBC # BLD AUTO: 12.8 K/MM3 (ref 4–10)

## 2017-12-31 RX ADMIN — PIPERACILLIN SODIUM,TAZOBACTAM SODIUM SCH MLS/HR: 3; .375 INJECTION, POWDER, FOR SOLUTION INTRAVENOUS at 17:06

## 2017-12-31 RX ADMIN — PIPERACILLIN SODIUM,TAZOBACTAM SODIUM SCH MLS/HR: 3; .375 INJECTION, POWDER, FOR SOLUTION INTRAVENOUS at 10:46

## 2017-12-31 RX ADMIN — IPRATROPIUM BROMIDE AND ALBUTEROL SULFATE SCH AMP: .5; 3 SOLUTION RESPIRATORY (INHALATION) at 05:35

## 2017-12-31 RX ADMIN — MULTIVITAMIN TABLET SCH TAB: TABLET at 10:46

## 2017-12-31 RX ADMIN — Medication SCH ML: at 19:13

## 2017-12-31 RX ADMIN — Medication SCH ML: at 10:45

## 2017-12-31 RX ADMIN — PANTOPRAZOLE SODIUM SCH MG: 40 TABLET, DELAYED RELEASE ORAL at 10:46

## 2017-12-31 RX ADMIN — HEPARIN SODIUM SCH UNIT: 5000 INJECTION, SOLUTION INTRAVENOUS; SUBCUTANEOUS at 21:00

## 2017-12-31 RX ADMIN — PIPERACILLIN SODIUM,TAZOBACTAM SODIUM SCH MLS/HR: 3; .375 INJECTION, POWDER, FOR SOLUTION INTRAVENOUS at 01:46

## 2017-12-31 RX ADMIN — PREDNISONE SCH MG: 20 TABLET ORAL at 10:46

## 2017-12-31 RX ADMIN — VANCOMYCIN HYDROCHLORIDE SCH MLS/HR: 1 INJECTION, POWDER, LYOPHILIZED, FOR SOLUTION INTRAVENOUS at 10:48

## 2017-12-31 RX ADMIN — IPRATROPIUM BROMIDE AND ALBUTEROL SULFATE SCH AMP: .5; 3 SOLUTION RESPIRATORY (INHALATION) at 05:57

## 2017-12-31 RX ADMIN — HEPARIN SODIUM SCH UNIT: 5000 INJECTION, SOLUTION INTRAVENOUS; SUBCUTANEOUS at 14:00

## 2017-12-31 RX ADMIN — HEPARIN SODIUM SCH UNIT: 5000 INJECTION, SOLUTION INTRAVENOUS; SUBCUTANEOUS at 05:32

## 2017-12-31 RX ADMIN — IPRATROPIUM BROMIDE AND ALBUTEROL SULFATE SCH AMP: .5; 3 SOLUTION RESPIRATORY (INHALATION) at 23:08

## 2017-12-31 RX ADMIN — IPRATROPIUM BROMIDE AND ALBUTEROL SULFATE SCH AMP: .5; 3 SOLUTION RESPIRATORY (INHALATION) at 12:43

## 2017-12-31 NOTE — PN
Teaching Attending Note


Name of Resident: Josee Dent





ATTENDING PHYSICIAN STATEMENT





I saw and evaluated the patient.


I reviewed the resident's note and discussed the case with the resident.


I agree with the resident's findings and plan as documented.








SUBJECTIVE:states breathing is the same. denies Cp, SOB, fever, chills, cough, N

/V/C/D. +BM yesterday


PVC noted on monitor








OBJECTIVE:


 Last Vital Signs











Temp Pulse Resp BP Pulse Ox


 


 97.5 F L  82   20   134/69   93 L


 


 12/31/17 06:00  12/31/17 06:00  12/31/17 06:00  12/31/17 06:00  12/30/17 21:00








General NAD


Lungs coarse breath sounds no crackles or wheezing


Extremities no pedal edema





ASSESSMENT AND PLAN:


86 year old male with a PMHx of HTN, COPD (3L NC at night), Osteoarthritis, 

Dysphagia, PUD presented to the ER with acute hypoxic respiratory distress


1. Acute hypoxic hypercapnic respiratory distress- due to possible PNA vs acute 

on chronic COPD. saturating 90% on 3L NC. will hold lasix today as CXR looks 

improved. no crackles on exam. on prednison po. Supplemental oxygen to maintain 

spO2 >88%. pulmonary on board


2. Sepsis due to PNA- afebrile. leukocytosis trending down. agreed to MBS and 

placed on puree diet no liquids. on Vanco/zosyn day 5. vanco trough reduced. 

check vanco trough prior to 4th dose ( tues) Flu and legonella negative. ID on 

board


3. Hypophosphatemia- resolved


4. hypokalemia- kcl 40meq po


4. HTN- currently normotensive. not on medications at home. cont to monitor. 


5. PUD- no sings of bleeding. stress ulcer ppx. protonix po


6. DVT ppx- hep sq


7. family decided to make status DNR/DNI, no central line or pressors. will 

need JOE on discharge

## 2017-12-31 NOTE — PN
Physical Exam: 


SUBJECTIVE: Patient seen and examined. He is lethargic, denies pain. No 

overnight events. PVC on monitor recorded.








OBJECTIVE:





 Vital Signs











 Period  Temp  Pulse  Resp  BP Sys/Penaloza  Pulse Ox


 


 Last 24 Hr  97.5 F-98.3 F  82-90  20-20  120-137/68-87  93











GENERAL: NAD, awake, alert, laying in bed with VM on face.


HEENT: No JVD, sclera anicteric, EOMI, MMM


LUNGS: Rhales B/L, no wheezes or rhonchi noted. No accessory muscle use. 


HEART: RRR, S1, S2 without murmur, rubs, gallops.


ABDOMEN: Soft, nontender, nondistended, normoactive bowel sounds, no guarding, 

no hepatomegaly


EXTREMITIES: 2+ DP pulses, warm, well-perfused, no edema b/l


NEUROLOGICAL: Normal speech, gait not observed.


SKIN: Warm, no rashes noted














 Laboratory Results - last 24 hr











  12/27/17 12/31/17 12/31/17





  18:00 07:30 07:30


 


WBC   12.8 H 


 


RBC   4.30 


 


Hgb   12.2 


 


Hct   39.9 


 


MCV   92.9 


 


MCH   28.5 


 


MCHC   30.7 L 


 


RDW   16.8 H 


 


Plt Count   381  D 


 


MPV   8.6 


 


Neutrophils %   82.6 


 


Lymphocytes %   8.3  D 


 


Monocytes %   8.9 


 


Eosinophils %   0.0 


 


Basophils %   0.2 


 


Sodium    138


 


Potassium    3.3 L


 


Chloride    90 L


 


Carbon Dioxide    39 H


 


Anion Gap    9


 


BUN    17  D


 


Creatinine    0.4 L


 


Random Glucose    105  D


 


Calcium    8.9


 


Phosphorus    2.6  D


 


Magnesium    2.0


 


M.pneumoniae IgM Titer  <770  








Active Medications











Generic Name Dose Route Start Last Admin





  Trade Name Freq  PRN Reason Stop Dose Admin


 


Acetaminophen  1,000 mg  12/28/17 16:51  





  Ofirmev Injection -  IVPB   





  Q6H PRN   





  FEVER OR PAIN   


 


Albuterol Sulfate  1 amp  12/28/17 16:51  12/30/17 21:04





  Ventolin 0.083% Nebulizer Soln -  NEB   1 amp





  Q4H PRN   Administration





  SHORT OF BREATH/WHEEZING   


 


Albuterol/Ipratropium  1 amp  12/28/17 18:00  12/31/17 05:57





  Duoneb -  NEB   1 amp





  QIDR LIV   Administration


 


Amino Acids  30 ml  12/29/17 17:30  12/31/17 10:45





  Prosource No Carb Liquid Pkt  PO   30 ml





  BID@0800,1730 LIV   Administration


 


Heparin Sodium (Porcine)  5,000 unit  12/28/17 22:00  12/31/17 05:32





  Heparin -  SQ   5,000 unit





  TID LIV   Administration


 


Piperacillin Sod/Tazobactam  100 mls @ 200 mls/hr  12/28/17 18:00  12/31/17 10:

46





  Sod 3.375 gm/ Dextrose  IVPB   200 mls/hr





  Q8H-IV LIV   Administration


 


Vancomycin HCl 1,000 mg/  250 mls @ 166.667 mls/hr  12/31/17 10:00  12/31/17 10:

48





  Dextrose  IVPB   166.667 mls/hr





  DAILY LIV   Administration


 


Multivitamins/Minerals/Vitamin C  1 tab  12/29/17 10:00  12/31/17 10:46





  Tab-A-Vit -  PO   1 tab





  DAILY LIV   Administration


 


Pantoprazole Sodium  40 mg  12/31/17 10:00  12/31/17 10:46





  Protonix -  PO   40 mg





  DAILY LIV   Administration


 


Prednisone  40 mg  12/31/17 10:00  12/31/17 10:46





  Deltasone -  PO   40 mg





  DAILY LIV   Administration











ASSESSMENT/PLAN:


86 year old male with a PMHx of HTN, COPD (3L NC at night), Osteoarthritis, PUD 

presented to the ER with acute hypoxic respiratory distress.





Acute hypoxic hypercapnic respiratory distress


due to PNA vs acute on chronic COPD.


saturating 93% on VM, 


continue Prednisone 40 mg PO qd 


continue supplemental oxygen


f/u Pulmonary recmmendations








Sepsis due to PNA


afebrile, leukocytosis trending down, today 12.8


on Vanco/zosyn day 5, 


vanco trough prior to 4th dose-will be on Tuesday


Flu and legonella negative


f/u ID recommendations


CXR-improvement on R base today


blood cultures neg





hypokalemia


 kcl 40meq PO ONCE added today





Hypophosphatemia


 resolved





HTN 


monitor, not on meds





PUD


cont Protonix PO





DVT ppx


hep sq





Dispo:


Med surg





Code Status: DNR/DNI








Problem List





- Problems


(1) COPD (chronic obstructive pulmonary disease)


Code(s): J44.9 - CHRONIC OBSTRUCTIVE PULMONARY DISEASE, UNSPECIFIED   


Qualifiers: 


   COPD type: COPD with acute lower respiratory infection   Qualified Code(s): 

J44.0 - Chronic obstructive pulmonary disease with acute lower respiratory 

infection   





(2) Chronic steroid use


Code(s): [id number] -    





(3) Hypertension


Code(s): I10 - ESSENTIAL (PRIMARY) HYPERTENSION   





(4) Pneumonia


Code(s): J18.9 - PNEUMONIA, UNSPECIFIED ORGANISM   


Qualifiers: 


   Pneumonia type: due to unspecified organism   Laterality: left   Lung 

location: lower lobe of lung   Qualified Code(s): J18.1 - Lobar pneumonia, 

unspecified organism   





(5) Respiratory failure


Code(s): J96.90 - RESPIRATORY FAILURE, UNSP, UNSP W HYPOXIA OR HYPERCAPNIA   





(6) Sepsis


Code(s): A41.9 - SEPSIS, UNSPECIFIED ORGANISM   


Qualifiers: 


   Sepsis type: sepsis due to unspecified organism   Qualified Code(s): A41.9 - 

Sepsis, unspecified organism   





Visit type





- Emergency Visit


Emergency Visit: Yes


ED Registration Date: 12/27/17


Care time: The patient presented to the Emergency Department on the above date 

and was hospitalized for further evaluation of their emergent condition.





- New Patient


This patient is new to me today: No





- Critical Care


Critical Care patient: No





- Discharge Referral


Referred to [facility] No

## 2017-12-31 NOTE — PN
Progress Note (short form)





- Note


Progress Note: 


Remains mildy tachypneic at rest on 5 L NC O@. 


Still with some congested cough. 


 


 Intake & Output











 12/28/17 12/29/17 12/30/17 12/31/17





 23:59 23:59 23:59 23:59


 


Intake Total 1250 640 270 100


 


Output Total   1500 500


 


Balance 1250 640 -1230 -400


 


Weight 164 lb 6 oz   








 Last Vital Signs











Temp Pulse Resp BP Pulse Ox


 


 97.5 F L  82   20   134/69   93 L


 


 12/31/17 06:00  12/31/17 06:00  12/31/17 06:00  12/31/17 06:00  12/30/17 21:00








Active Medications





Acetaminophen (Ofirmev Injection -)  1,000 mg IVPB Q6H PRN


   PRN Reason: FEVER OR PAIN


Albuterol Sulfate (Ventolin 0.083% Nebulizer Soln -)  1 amp NEB Q4H PRN


   PRN Reason: SHORT OF BREATH/WHEEZING


   Last Admin: 12/30/17 21:04 Dose:  1 amp


Albuterol/Ipratropium (Duoneb -)  1 amp NEB QIDR [facility]


   Last Admin: 12/31/17 05:57 Dose:  1 amp


Amino Acids (Prosource No Carb Liquid Pkt)  30 ml PO BID@0800,1730 [facility]


   Last Admin: 12/31/17 10:45 Dose:  30 ml


Heparin Sodium (Porcine) (Heparin -)  5,000 unit SQ TID [facility]


   Last Admin: 12/31/17 05:32 Dose:  5,000 unit


Piperacillin Sod/Tazobactam (Sod 3.375 gm/ Dextrose)  100 mls @ 200 mls/hr IVPB 

Q8H-IV [facility]


   Last Admin: 12/31/17 10:46 Dose:  200 mls/hr


Vancomycin HCl 1,000 mg/ (Dextrose)  250 mls @ 166.667 mls/hr IVPB DAILY [facility]


   Last Admin: 12/31/17 10:48 Dose:  166.667 mls/hr


Multivitamins/Minerals/Vitamin C (Tab-A-Vit -)  1 tab PO DAILY [facility]


   Last Admin: 12/31/17 10:46 Dose:  1 tab


Pantoprazole Sodium (Protonix -)  40 mg PO DAILY [facility]


   Last Admin: 12/31/17 10:46 Dose:  40 mg


Prednisone (Deltasone -)  40 mg PO DAILY [facility]


   Last Admin: 12/31/17 10:46 Dose:  40 mg











Constitutional: Yes: Mildly tachypneic at rest 


Eyes: Yes: WNL


HENT: Yes: WNL


Neck: Yes: WNL


Cardiovascular: Yes: Regular Rate and Rhythm, S1, S2


Respiratory: Yes: Bilateral Rhonchi, no wheeze 


Gastrointestinal: Yes: Normal Bowel Sounds, Soft


Extremities: Yes: WNL


Edema: No


Labs: 


 


 


 Laboratory Results - last 24 hr











  12/27/17 12/31/17 12/31/17





  18:00 07:30 07:30


 


WBC   12.8 H 


 


RBC   4.30 


 


Hgb   12.2 


 


Hct   39.9 


 


MCV   92.9 


 


MCH   28.5 


 


MCHC   30.7 L 


 


RDW   16.8 H 


 


Plt Count   381  D 


 


MPV   8.6 


 


Neutrophils %   82.6 


 


Lymphocytes %   8.3  D 


 


Monocytes %   8.9 


 


Eosinophils %   0.0 


 


Basophils %   0.2 


 


Sodium    138


 


Potassium    3.3 L


 


Chloride    90 L


 


Carbon Dioxide    39 H


 


Anion Gap    9


 


BUN    17  D


 


Creatinine    0.4 L


 


Random Glucose    105  D


 


Calcium    8.9


 


Phosphorus    2.6  D


 


Magnesium    2.0


 


M.pneumoniae IgM Titer  <770  











Assessment/Plan


Problem List


- Problems


(1) COPD (chronic obstructive pulmonary disease)


Code(s): J44.9 - CHRONIC OBSTRUCTIVE PULMONARY DISEASE, UNSPECIFIED   


Qualifiers: 


   COPD type: COPD with acute lower respiratory infection   Qualified Code(s): 

J44.0 - Chronic obstructive pulmonary disease with acute lower respiratory 

infection   





(2) Hypertension


Code(s): I10 - ESSENTIAL (PRIMARY) HYPERTENSION   





(3) Pneumonia


Code(s): J18.9 - PNEUMONIA, UNSPECIFIED ORGANISM   


Qualifiers: 


   Pneumonia type: due to unspecified organism   Laterality: left   Lung 

location: lower lobe of lung   Qualified Code(s): J18.1 - Lobar pneumonia, 

unspecified organism   





(4) Respiratory failure


Code(s): J96.90 - RESPIRATORY FAILURE, UNSP, UNSP W HYPOXIA OR HYPERCAPNIA   








Assessment/Plan


Hypoxemic respiratory failure


R/O PNA vs fluid overload 


Acute COPD exacerbation 


Troponin leak likely demand ischemia 


HTN


Recent GIB, now with stable Hgb








-ABX per ID 


-BD TX 


-Prednisone   


-heparin SubQ


-protonix for GI ppx (recent GIB)


-DNR/DNI/no invasive procedures as per prior GOC conversations





Dr Miner

## 2017-12-31 NOTE — PN
Progress Note, SLP





- Note


Progress Note: 


86 seen at bedside for f/up to MBS with recommendations for dysphagia puree and 

honey thicken liquids via spoon when congestion subsides.  Currently pt is 

tolerating the diet with minimal s/s of dysphagia / aspiration.  Chart review 

indicates pt is consuming approximately 50% of most meals at this time.   

Recommendations:  Continue current diet of dysphagia puree and honey thick via 

spoon SLP will continue to monitor and revise diet plan as needed.

## 2018-01-01 LAB
ANION GAP SERPL CALC-SCNC: 8 MMOL/L (ref 8–16)
BUN SERPL-MCNC: 16 MG/DL (ref 7–18)
CALCIUM SERPL-MCNC: 9.6 MG/DL (ref 8.5–10.1)
CHLORIDE SERPL-SCNC: 93 MMOL/L (ref 98–107)
CO2 SERPL-SCNC: 40 MMOL/L (ref 21–32)
CREAT SERPL-MCNC: 0.4 MG/DL (ref 0.7–1.3)
DEPRECATED RDW RBC AUTO: 16.9 % (ref 11.9–15.9)
GLUCOSE SERPL-MCNC: 86 MG/DL (ref 74–106)
HCT VFR BLD CALC: 41.1 % (ref 35.4–49)
HGB BLD-MCNC: 12.7 GM/DL (ref 11.7–16.9)
MCH RBC QN AUTO: 28.9 PG (ref 25.7–33.7)
MCHC RBC AUTO-ENTMCNC: 30.8 G/DL (ref 32–35.9)
MCV RBC: 93.7 FL (ref 80–96)
PLATELET # BLD AUTO: 388 K/MM3 (ref 134–434)
PLATELET BLD QL SMEAR: ADEQUATE
PMV BLD: 8.9 FL (ref 7.5–11.1)
POTASSIUM SERPLBLD-SCNC: 4 MMOL/L (ref 3.5–5.1)
RBC # BLD AUTO: 4.38 M/MM3 (ref 4–5.6)
SODIUM SERPL-SCNC: 141 MMOL/L (ref 136–145)
WBC # BLD AUTO: 11.6 K/MM3 (ref 4–10)

## 2018-01-01 RX ADMIN — IPRATROPIUM BROMIDE AND ALBUTEROL SULFATE SCH AMP: .5; 3 SOLUTION RESPIRATORY (INHALATION) at 23:21

## 2018-01-01 RX ADMIN — PIPERACILLIN SODIUM,TAZOBACTAM SODIUM SCH MLS/HR: 3; .375 INJECTION, POWDER, FOR SOLUTION INTRAVENOUS at 17:44

## 2018-01-01 RX ADMIN — IPRATROPIUM BROMIDE AND ALBUTEROL SULFATE SCH: .5; 3 SOLUTION RESPIRATORY (INHALATION) at 17:39

## 2018-01-01 RX ADMIN — Medication SCH ML: at 10:03

## 2018-01-01 RX ADMIN — VANCOMYCIN HYDROCHLORIDE SCH MLS/HR: 1 INJECTION, POWDER, LYOPHILIZED, FOR SOLUTION INTRAVENOUS at 11:35

## 2018-01-01 RX ADMIN — HEPARIN SODIUM SCH UNIT: 5000 INJECTION, SOLUTION INTRAVENOUS; SUBCUTANEOUS at 21:09

## 2018-01-01 RX ADMIN — PANTOPRAZOLE SODIUM SCH MG: 40 TABLET, DELAYED RELEASE ORAL at 10:03

## 2018-01-01 RX ADMIN — IPRATROPIUM BROMIDE AND ALBUTEROL SULFATE SCH AMP: .5; 3 SOLUTION RESPIRATORY (INHALATION) at 06:48

## 2018-01-01 RX ADMIN — PREDNISONE SCH MG: 20 TABLET ORAL at 10:03

## 2018-01-01 RX ADMIN — MULTIVITAMIN TABLET SCH TAB: TABLET at 10:03

## 2018-01-01 RX ADMIN — HEPARIN SODIUM SCH UNIT: 5000 INJECTION, SOLUTION INTRAVENOUS; SUBCUTANEOUS at 14:45

## 2018-01-01 RX ADMIN — Medication SCH ML: at 17:44

## 2018-01-01 RX ADMIN — IPRATROPIUM BROMIDE AND ALBUTEROL SULFATE SCH: .5; 3 SOLUTION RESPIRATORY (INHALATION) at 13:49

## 2018-01-01 RX ADMIN — PIPERACILLIN SODIUM,TAZOBACTAM SODIUM SCH MLS/HR: 3; .375 INJECTION, POWDER, FOR SOLUTION INTRAVENOUS at 02:12

## 2018-01-01 RX ADMIN — PIPERACILLIN SODIUM,TAZOBACTAM SODIUM SCH MLS/HR: 3; .375 INJECTION, POWDER, FOR SOLUTION INTRAVENOUS at 09:44

## 2018-01-01 RX ADMIN — HEPARIN SODIUM SCH UNIT: 5000 INJECTION, SOLUTION INTRAVENOUS; SUBCUTANEOUS at 05:32

## 2018-01-01 NOTE — PN
Progress Note, Physician


History of Present Illness: 





Awake, lethargic


Offers no complaints


Breathing non-labored


Afebrile


WBC improved





- Current Medication List


Current Medications: 


Active Medications





Acetaminophen (Ofirmev Injection -)  1,000 mg IVPB Q6H PRN


   PRN Reason: FEVER OR PAIN


Albuterol Sulfate (Ventolin 0.083% Nebulizer Soln -)  1 amp NEB Q4H PRN


   PRN Reason: SHORT OF BREATH/WHEEZING


   Last Admin: 12/30/17 21:04 Dose:  1 amp


Albuterol/Ipratropium (Duoneb -)  1 amp NEB QIDR [facility]


   Last Admin: 01/01/18 13:49 Dose:  Not Given


Amino Acids (Prosource No Carb Liquid Pkt)  30 ml PO BID@0800,1730 [facility]


   Last Admin: 01/01/18 10:03 Dose:  30 ml


Heparin Sodium (Porcine) (Heparin -)  5,000 unit SQ TID [facility]


   Last Admin: 01/01/18 14:45 Dose:  5,000 unit


Piperacillin Sod/Tazobactam (Sod 3.375 gm/ Dextrose)  100 mls @ 200 mls/hr IVPB 

Q8H-IV [facility]


   Last Admin: 01/01/18 09:44 Dose:  200 mls/hr


Vancomycin HCl 1,000 mg/ (Dextrose)  250 mls @ 166.667 mls/hr IVPB DAILY [facility]


   Last Admin: 01/01/18 11:35 Dose:  166.667 mls/hr


Multivitamins/Minerals/Vitamin C (Tab-A-Vit -)  1 tab PO DAILY [facility]


   Last Admin: 01/01/18 10:03 Dose:  1 tab


Pantoprazole Sodium (Protonix -)  40 mg PO DAILY [facility]


   Last Admin: 01/01/18 10:03 Dose:  40 mg


Prednisone (Deltasone -)  30 mg PO DAILY [facility]











- Objective


Vital Signs: 


 Vital Signs











Temperature  97.7 F   01/01/18 09:00


 


Pulse Rate  99 H  01/01/18 09:00


 


Respiratory Rate  20   01/01/18 09:00


 


Blood Pressure  132/83   01/01/18 09:00


 


O2 Sat by Pulse Oximetry (%)  90 L  01/01/18 09:00











Constitutional: Yes: No Distress


Eyes: Yes: Conjunctiva Clear


Cardiovascular: Yes: Regular Rate and Rhythm, S1, S2


Respiratory: Yes: Rhonchi


Gastrointestinal: Yes: Normal Bowel Sounds, Soft


Labs: 


 CBC, BMP





 01/01/18 08:20 





 01/01/18 08:20 





 INR, PTT











INR  1.12  (0.82-1.09)   12/27/17  09:15    














Assessment/Plan





Pneumonia


Leukocytosis


Continue zosyn/ vancomycin

## 2018-01-01 NOTE — PN
Progress Note (short form)





- Note


Progress Note: 


Remains mildy tachypneic at rest on 4 L NC O@. 


Still with some congested cough. 


 


 


 Intake & Output











 12/29/17 12/30/17 12/31/17 01/01/18





 23:59 23:59 23:59 23:59


 


Intake Total 640 270 450 100


 


Output Total  1500 900 300


 


Balance 640 -1230 -450 -200


 


Weight    170 lb 8 oz








 Last Vital Signs











Temp Pulse Resp BP Pulse Ox


 


 97.7 F   99 H  20   132/83   90 L


 


 01/01/18 09:00  01/01/18 09:00  01/01/18 09:00  01/01/18 09:00  12/31/17 21:00








Active Medications





Acetaminophen (Ofirmev Injection -)  1,000 mg IVPB Q6H PRN


   PRN Reason: FEVER OR PAIN


Albuterol Sulfate (Ventolin 0.083% Nebulizer Soln -)  1 amp NEB Q4H PRN


   PRN Reason: SHORT OF BREATH/WHEEZING


   Last Admin: 12/30/17 21:04 Dose:  1 amp


Albuterol/Ipratropium (Duoneb -)  1 amp NEB QIDR [facility]


   Last Admin: 01/01/18 06:48 Dose:  1 amp


Amino Acids (Prosource No Carb Liquid Pkt)  30 ml PO BID@0800,1730 [facility]


   Last Admin: 01/01/18 10:03 Dose:  30 ml


Heparin Sodium (Porcine) (Heparin -)  5,000 unit SQ TID [facility]


   Last Admin: 01/01/18 05:32 Dose:  5,000 unit


Piperacillin Sod/Tazobactam (Sod 3.375 gm/ Dextrose)  100 mls @ 200 mls/hr IVPB 

Q8H-IV [facility]


   Last Admin: 01/01/18 09:44 Dose:  200 mls/hr


Vancomycin HCl 1,000 mg/ (Dextrose)  250 mls @ 166.667 mls/hr IVPB DAILY [facility]


   Last Admin: 12/31/17 10:48 Dose:  166.667 mls/hr


Multivitamins/Minerals/Vitamin C (Tab-A-Vit -)  1 tab PO DAILY [facility]


   Last Admin: 01/01/18 10:03 Dose:  1 tab


Pantoprazole Sodium (Protonix -)  40 mg PO DAILY [facility]


   Last Admin: 01/01/18 10:03 Dose:  40 mg


Prednisone (Deltasone -)  40 mg PO DAILY [facility]


   Last Admin: 01/01/18 10:03 Dose:  40 mg











Constitutional: Yes: Mildly tachypneic at rest 


Eyes: Yes: WNL


HENT: Yes: WNL


Neck: Yes: WNL


Cardiovascular: Yes: Regular Rate and Rhythm, S1, S2


Respiratory: Yes: Bilateral Rhonchi, no wheeze 


Gastrointestinal: Yes: Normal Bowel Sounds, Soft


Extremities: Yes: WNL


Edema: No


Labs: 


 


 


 


 Laboratory Results - last 24 hr











  01/01/18 01/01/18





  08:20 08:20


 


WBC  11.6 H 


 


RBC  4.38 


 


Hgb  12.7 


 


Hct  41.1 


 


MCV  93.7 


 


MCH  28.9 


 


MCHC  30.8 L 


 


RDW  16.9 H 


 


Plt Count  388 


 


MPV  8.9 


 


Total Counted  100 


 


Neutrophils %  No Result Required. 


 


Neutrophils % (Manual)  68.0 


 


Band Neutrophils %  8.0 


 


Lymphocytes %  No Result Required. 


 


Lymphocytes % (Manual)  12.0 


 


Monocytes % (Manual)  11 H 


 


Platelet Estimate  Adequate 


 


Sodium   141


 


Potassium   4.0  D


 


Chloride   93 L


 


Carbon Dioxide   40 H


 


Anion Gap   8


 


BUN   16


 


Creatinine   0.4 L


 


Random Glucose   86


 


Calcium   9.6











Assessment/Plan


Problem List


- Problems


(1) COPD (chronic obstructive pulmonary disease)


Code(s): J44.9 - CHRONIC OBSTRUCTIVE PULMONARY DISEASE, UNSPECIFIED   


Qualifiers: 


   COPD type: COPD with acute lower respiratory infection   Qualified Code(s): 

J44.0 - Chronic obstructive pulmonary disease with acute lower respiratory 

infection   





(2) Hypertension


Code(s): I10 - ESSENTIAL (PRIMARY) HYPERTENSION   





(3) Pneumonia


Code(s): J18.9 - PNEUMONIA, UNSPECIFIED ORGANISM   


Qualifiers: 


   Pneumonia type: due to unspecified organism   Laterality: left   Lung 

location: lower lobe of lung   Qualified Code(s): J18.1 - Lobar pneumonia, 

unspecified organism   





(4) Respiratory failure


Code(s): J96.90 - RESPIRATORY FAILURE, UNSP, UNSP W HYPOXIA OR HYPERCAPNIA   








Assessment/Plan


Hypoxemic respiratory failure


Multilobar PNA 


Fluid overload 


Acute COPD exacerbation 


Troponin leak likely demand ischemia 


HTN


Recent GIB








-ABX per ID 


-BD TX 


-Prednisone   


-heparin SubQ


-protonix for GI ppx 


-O2 to maintain saturation 


-DNR/DNI/no invasive procedures as per prior GOC conversations





Dr Miner

## 2018-01-01 NOTE — PN
Progress Note (short form)





- Note


Progress Note: 





states breathing is improved. denies Cp, SOB, fever, chills, cough, N/V/C/D





 Current Medications











Generic Name Dose Route Start Last Admin





  Trade Name Freq  PRN Reason Stop Dose Admin


 


Acetaminophen  1,000 mg  12/28/17 16:51  





  Ofirmev Injection -  IVPB   





  Q6H PRN   





  FEVER OR PAIN   


 


Albuterol Sulfate  1 amp  12/28/17 16:51  12/30/17 21:04





  Ventolin 0.083% Nebulizer Soln -  NEB   1 amp





  Q4H PRN   Administration





  SHORT OF BREATH/WHEEZING   


 


Albuterol/Ipratropium  1 amp  12/28/17 18:00  01/01/18 06:48





  Duoneb -  NEB   1 amp





  QIDR [facility]   Administration


 


Amino Acids  30 ml  12/29/17 17:30  01/01/18 10:03





  Prosource No Carb Liquid Pkt  PO   30 ml





  BID@0800,1730 [facility]   Administration


 


Heparin Sodium (Porcine)  5,000 unit  12/28/17 22:00  01/01/18 05:32





  Heparin -  SQ   5,000 unit





  TID [facility]   Administration


 


Piperacillin Sod/Tazobactam  100 mls @ 200 mls/hr  12/28/17 18:00  01/01/18 09:

44





  Sod 3.375 gm/ Dextrose  IVPB   200 mls/hr





  Q8H-IV [facility]   Administration


 


Vancomycin HCl 1,000 mg/  250 mls @ 166.667 mls/hr  12/31/17 10:00  01/01/18 11:

35





  Dextrose  IVPB   166.667 mls/hr





  DAILY LIV   Administration


 


Multivitamins/Minerals/Vitamin C  1 tab  12/29/17 10:00  01/01/18 10:03





  Tab-A-Vit -  PO   1 tab





  DAILY LIV   Administration


 


Pantoprazole Sodium  40 mg  12/31/17 10:00  01/01/18 10:03





  Protonix -  PO   40 mg





  DAILY LIV   Administration


 


Prednisone  40 mg  12/31/17 10:00  01/01/18 10:03





  Deltasone -  PO   40 mg





  DAILY LIV   Administration








 Last Vital Signs











Temp Pulse Resp BP Pulse Ox


 


 97.7 F   99 H  20   132/83   90 L


 


 01/01/18 09:00  01/01/18 09:00  01/01/18 09:00  01/01/18 09:00  12/31/17 21:00








General NAD


Lungs mild wheezing, no crackles


Extremities no pedal edema





 CBCD











WBC  11.6 K/mm3 (4.0-10.0)  H  01/01/18  08:20    


 


RBC  4.38 M/mm3 (4.00-5.60)   01/01/18  08:20    


 


Hgb  12.7 GM/dL (11.7-16.9)   01/01/18  08:20    


 


Hct  41.1 % (35.4-49)   01/01/18  08:20    


 


MCV  93.7 fl (80-96)   01/01/18  08:20    


 


MCHC  30.8 g/dl (32.0-35.9)  L  01/01/18  08:20    


 


RDW  16.9 % (11.9-15.9)  H  01/01/18  08:20    


 


Plt Count  388 K/MM3 (134-434)   01/01/18  08:20    


 


MPV  8.9 fl (7.5-11.1)   01/01/18  08:20    








 CMP











Sodium  141 mmol/L (136-145)   01/01/18  08:20    


 


Potassium  4.0 mmol/L (3.5-5.1)  D 01/01/18  08:20    


 


Chloride  93 mmol/L ()  L  01/01/18  08:20    


 


Carbon Dioxide  40 mmol/L (21-32)  H  01/01/18  08:20    


 


Anion Gap  8  (8-16)   01/01/18  08:20    


 


BUN  16 mg/dL (7-18)   01/01/18  08:20    


 


Creatinine  0.4 mg/dL (0.7-1.3)  L  01/01/18  08:20    


 


Creat Clearance w eGFR  > 60  (>60)   12/30/17  06:30    


 


Calcium  9.6 mg/dL (8.5-10.1)   01/01/18  08:20    


 


Total Bilirubin  0.7 mg/dL (0.2-1.0)  D 12/30/17  06:30    


 


AST  77 U/L (15-37)  H D 12/30/17  06:30    


 


ALT  55 U/L (12-78)  D 12/30/17  06:30    


 


Alkaline Phosphatase  114 U/L ()   12/30/17  06:30    


 


Total Protein  6.4 g/dl (6.4-8.2)   12/30/17  06:30    


 


Albumin  2.3 g/dl (3.4-5.0)  L  12/30/17  06:30    








 Microbiology











 12/27/17 09:15 Blood Culture - Final





 Blood - Peripheral Venous    NO GROWTH AFTER 5 DAYS INCUBATION


 


 12/27/17 09:15 Blood Culture - Final





 Blood - Peripheral Venous    NO GROWTH AFTER 5 DAYS INCUBATION














ASSESSMENT AND PLAN:


86 year old male with a PMHx of HTN, COPD (3L NC at night), Osteoarthritis, 

Dysphagia, PUD presented to the ER with acute hypoxic respiratory distress


1. Acute hypoxic hypercapnic respiratory distress- due to possible PNA vs acute 

on chronic COPD. saturating 92% on 3.5L NC. clinically improved. less 

tachypnic. on prednisone taper. reduce dose tomorrow to 30mg. Supplemental 

oxygen to maintain spO2 >88%. pulmonary on board. check pre and post prior to 

discharge


2. Sepsis due to PNA- afebrile. leukocytosis trending down. tolerating puree 

diet. on Vanco/zosyn day 6. vanco trough reduced. check vanco trough prior to 

4th dose (tues) Flu and legonella negative. ID on board


3. Hypophosphatemia- resolved


4. hypokalemia-resolved


4. HTN- currently normotensive. not on medications at home. cont to monitor. 


5. PUD- no sings of bleeding. stress ulcer ppx. protonix po


6. DVT ppx- hep sq


7. family decided to make status DNR/DNI, no central line or pressors. will 

need JOE on discharge











Visit type





- Emergency Visit


Emergency Visit: Yes


ED Registration Date: 12/27/17


Care time: The patient presented to the Emergency Department on the above date 

and was hospitalized for further evaluation of their emergent condition.





- New Patient


This patient is new to me today: No





- Critical Care


Critical Care patient: No





- Discharge Referral


Referred to [facility] No

## 2018-01-02 LAB
ANION GAP SERPL CALC-SCNC: 6 MMOL/L (ref 8–16)
BNP SERPL-MCNC: 276.83 PG/ML (ref 5–450)
BUN SERPL-MCNC: 16 MG/DL (ref 7–18)
CALCIUM SERPL-MCNC: 9.5 MG/DL (ref 8.5–10.1)
CHLORIDE SERPL-SCNC: 97 MMOL/L (ref 98–107)
CO2 SERPL-SCNC: 37 MMOL/L (ref 21–32)
CREAT SERPL-MCNC: 0.4 MG/DL (ref 0.7–1.3)
GLUCOSE SERPL-MCNC: 103 MG/DL (ref 74–106)
POTASSIUM SERPLBLD-SCNC: 3.9 MMOL/L (ref 3.5–5.1)
SODIUM SERPL-SCNC: 140 MMOL/L (ref 136–145)

## 2018-01-02 RX ADMIN — HEPARIN SODIUM SCH UNIT: 5000 INJECTION, SOLUTION INTRAVENOUS; SUBCUTANEOUS at 14:19

## 2018-01-02 RX ADMIN — VANCOMYCIN HYDROCHLORIDE SCH MLS/HR: 1 INJECTION, POWDER, LYOPHILIZED, FOR SOLUTION INTRAVENOUS at 11:42

## 2018-01-02 RX ADMIN — PREDNISONE SCH MG: 20 TABLET ORAL at 10:05

## 2018-01-02 RX ADMIN — PIPERACILLIN SODIUM,TAZOBACTAM SODIUM SCH MLS/HR: 3; .375 INJECTION, POWDER, FOR SOLUTION INTRAVENOUS at 17:04

## 2018-01-02 RX ADMIN — MULTIVITAMIN TABLET SCH TAB: TABLET at 10:05

## 2018-01-02 RX ADMIN — IPRATROPIUM BROMIDE AND ALBUTEROL SULFATE SCH AMP: .5; 3 SOLUTION RESPIRATORY (INHALATION) at 11:15

## 2018-01-02 RX ADMIN — IPRATROPIUM BROMIDE AND ALBUTEROL SULFATE SCH AMP: .5; 3 SOLUTION RESPIRATORY (INHALATION) at 17:24

## 2018-01-02 RX ADMIN — HEPARIN SODIUM SCH UNIT: 5000 INJECTION, SOLUTION INTRAVENOUS; SUBCUTANEOUS at 21:57

## 2018-01-02 RX ADMIN — HEPARIN SODIUM SCH UNIT: 5000 INJECTION, SOLUTION INTRAVENOUS; SUBCUTANEOUS at 05:36

## 2018-01-02 RX ADMIN — PANTOPRAZOLE SODIUM SCH MG: 40 TABLET, DELAYED RELEASE ORAL at 10:05

## 2018-01-02 RX ADMIN — PIPERACILLIN SODIUM,TAZOBACTAM SODIUM SCH MLS/HR: 3; .375 INJECTION, POWDER, FOR SOLUTION INTRAVENOUS at 03:26

## 2018-01-02 RX ADMIN — Medication SCH ML: at 17:04

## 2018-01-02 RX ADMIN — IPRATROPIUM BROMIDE AND ALBUTEROL SULFATE SCH AMP: .5; 3 SOLUTION RESPIRATORY (INHALATION) at 06:34

## 2018-01-02 RX ADMIN — PIPERACILLIN SODIUM,TAZOBACTAM SODIUM SCH MLS/HR: 3; .375 INJECTION, POWDER, FOR SOLUTION INTRAVENOUS at 10:02

## 2018-01-02 RX ADMIN — Medication SCH ML: at 10:02

## 2018-01-02 NOTE — PN
Progress Note (short form)





- Note


Progress Note: 


less sob


feels better 


no complaints


alert





 


vanco trough 17





 Vital Signs











 Period  Temp  Pulse  Resp  BP Sys/Penaloza  Pulse Ox


 


 Last 24 Hr  97.4 F-98.2 F  74-93  20-22  117-133/50-79  90-90








cor-rrr


lungs clear, decreased bs at bases


abd soft,nt


ext no edema





 CBC, BMP





 01/01/18 08:20 





 01/02/18 09:05 











a/p





pneumonia (HAP)


COPD


?CHF-








vanco/zosyn day #6 of 7


appears clinically improved














Problem List





- Problems


(1) Pneumonia


Code(s): J18.9 - PNEUMONIA, UNSPECIFIED ORGANISM   


Qualifiers: 


   Pneumonia type: due to unspecified organism   Laterality: left   Lung 

location: lower lobe of lung   Qualified Code(s): J18.1 - Lobar pneumonia, 

unspecified organism   





(2) Respiratory failure


Code(s): J96.90 - RESPIRATORY FAILURE, UNSP, UNSP W HYPOXIA OR HYPERCAPNIA   





(3) COPD (chronic obstructive pulmonary disease)


Code(s): J44.9 - CHRONIC OBSTRUCTIVE PULMONARY DISEASE, UNSPECIFIED   


Qualifiers: 


   COPD type: COPD with acute lower respiratory infection   Qualified Code(s): 

J44.0 - Chronic obstructive pulmonary disease with acute lower respiratory 

infection

## 2018-01-02 NOTE — PN
Progress Note (short form)





- Note


Progress Note: 


Remains mildy tachypneic at rest on 4 L NC O@. 


Still with some congested cough. 


No acute events overnight. 








 


 


 Intake & Output











 12/30/17 12/31/17 01/01/18 01/02/18





 23:59 23:59 23:59 23:59


 


Intake Total 270 450 510 360


 


Output Total 1115 269 8938 


 


Balance -1230 -450 -540 360


 


Weight   170 lb 8 oz 








 Last Vital Signs











Temp Pulse Resp BP Pulse Ox


 


 97.5 F L  93 H  22   124/78   90 L


 


 01/02/18 10:00  01/02/18 10:00  01/02/18 10:00  01/02/18 10:00  01/02/18 09:00








Active Medications





Acetaminophen (Ofirmev Injection -)  1,000 mg IVPB Q6H PRN


   PRN Reason: FEVER OR PAIN


Albuterol Sulfate (Ventolin 0.083% Nebulizer Soln -)  1 amp NEB Q4H PRN


   PRN Reason: SHORT OF BREATH/WHEEZING


   Last Admin: 12/30/17 21:04 Dose:  1 amp


Albuterol/Ipratropium (Duoneb -)  1 amp NEB QIDR [facility]


   Last Admin: 01/02/18 11:15 Dose:  1 amp


Amino Acids (Prosource No Carb Liquid Pkt)  30 ml PO BID@0800,1730 [facility]


   Last Admin: 01/02/18 10:02 Dose:  30 ml


Heparin Sodium (Porcine) (Heparin -)  5,000 unit SQ TID [facility]


   Last Admin: 01/02/18 05:36 Dose:  5,000 unit


Piperacillin Sod/Tazobactam (Sod 3.375 gm/ Dextrose)  100 mls @ 200 mls/hr IVPB 

Q8H-IV [facility]


   Last Admin: 01/02/18 10:02 Dose:  200 mls/hr


Vancomycin HCl 1,000 mg/ (Dextrose)  250 mls @ 166.667 mls/hr IVPB DAILY [facility]


   Last Admin: 01/02/18 11:42 Dose:  166.667 mls/hr


Multivitamins/Minerals/Vitamin C (Tab-A-Vit -)  1 tab PO DAILY [facility]


   Last Admin: 01/02/18 10:05 Dose:  1 tab


Pantoprazole Sodium (Protonix -)  40 mg PO DAILY [facility]


   Last Admin: 01/02/18 10:05 Dose:  40 mg


Prednisone (Deltasone -)  30 mg PO DAILY [facility]


   Last Admin: 01/02/18 10:05 Dose:  30 mg











Constitutional: Yes: Mildly tachypneic at rest 


Eyes: Yes: WNL


HENT: Yes: WNL


Neck: Yes: WNL


Cardiovascular: Yes: Regular Rate and Rhythm, S1, S2


Respiratory: Yes: Bilateral Rhonchi, no wheeze 


Gastrointestinal: Yes: Normal Bowel Sounds, Soft


Extremities: Yes: WNL


Edema: No


Labs: 


 


 


 


 Laboratory Results - last 24 hr











  01/02/18 01/02/18





  09:05 09:05


 


Sodium   140


 


Potassium   3.9


 


Chloride   97 L


 


Carbon Dioxide   37 H


 


Anion Gap   6 L


 


BUN   16


 


Creatinine   0.4 L


 


Random Glucose   103


 


Calcium   9.5


 


Vancomycin Pre-Dose  10.663 H D 











Assessment/Plan


Problem List


- Problems


(1) COPD (chronic obstructive pulmonary disease)


Code(s): J44.9 - CHRONIC OBSTRUCTIVE PULMONARY DISEASE, UNSPECIFIED   


Qualifiers: 


   COPD type: COPD with acute lower respiratory infection   Qualified Code(s): 

J44.0 - Chronic obstructive pulmonary disease with acute lower respiratory 

infection   





(2) Hypertension


Code(s): I10 - ESSENTIAL (PRIMARY) HYPERTENSION   





(3) Pneumonia


Code(s): J18.9 - PNEUMONIA, UNSPECIFIED ORGANISM   


Qualifiers: 


   Pneumonia type: due to unspecified organism   Laterality: left   Lung 

location: lower lobe of lung   Qualified Code(s): J18.1 - Lobar pneumonia, 

unspecified organism   





(4) Respiratory failure


Code(s): J96.90 - RESPIRATORY FAILURE, UNSP, UNSP W HYPOXIA OR HYPERCAPNIA   








Assessment/Plan


Hypoxemic respiratory failure


Multilobar PNA 


Fluid overload 


Acute COPD exacerbation 


Troponin leak likely demand ischemia 


HTN


Recent GIB





-ABX per ID 


-BD TX 


-Prednisone   


-heparin SubQ


-protonix for GI ppx 


-O2 to maintain saturation 


-DNR/DNI/no invasive procedures as per prior GOC conversations





Dr Miner

## 2018-01-02 NOTE — PN
Teaching Attending Note


Name of Resident: Earl Davila





ATTENDING PHYSICIAN STATEMENT


time of evaluation: 11:40 AM


I saw and evaluated the patient.


I reviewed the resident's note and discussed the case with the resident.


I agree with the resident's findings and plan as documented.








SUBJECTIVE:


Patient seen and examined. Eating with assistance from family. no complaints. 





OBJECTIVE:


 Vital Signs











 Period  Temp  Pulse  Resp  BP Sys/Penaloza  Pulse Ox


 


 Last 24 Hr  97.4 F-98.0 F  78-93  20-22  117-133/50-79  90-90








 Intake & Output











 12/30/17 12/31/17 01/01/18 01/02/18





 23:59 23:59 23:59 23:59


 


Intake Total 270 450 510 360


 


Output Total 1065 591 8974 


 


Balance -1230 -450 -540 360


 


Weight   170 lb 8 oz 








general: eating in no acute distress


Chest: scattered expiratory wheezing with mild decrease in air entry


Abdomen: soft, NT, Nd, positive bowel sounds


extremities: no edema





 Home Medication List











 Medication  Instructions  Recorded  Confirmed  Type


 


Acetaminophen [Tylenol] 2 tab PO Q6H PRN 12/27/17 12/27/17 History


 


Albuterol 2.5/Ipratropium 0.5 1 amp NEB TID 12/27/17 12/27/17 History





[Duoneb -]    


 


Budesonide [Pulmicort 0.5 mg 1 neb PO BID 12/27/17 12/27/17 History





Nebulizer -]    


 


Ipratropium 0.02% Nebulizer 1 amp NEB Q6H PRN 12/27/17 12/27/17 History





[Atrovent 0.02% Nebulizer -]    


 


Menthol/Phenol [Cepastat Lozenge -] 1 each MM QID PRN 12/27/17 12/27/17 History


 


Metoclopramide HCl 10 mg PO TID 12/27/17 12/27/17 History


 


Prednisone 10 mg PO BID 12/27/17 12/27/17 History


 


Tramadol HCl [Ultram] 2 mg PO DAILY 12/27/17 12/27/17 History








 Active Medications











Generic Name Dose Route Start Last Admin





  Trade Name Freq  PRN Reason Stop Dose Admin


 


Acetaminophen  1,000 mg  12/28/17 16:51  





  Ofirmev Injection -  IVPB   





  Q6H PRN   





  FEVER OR PAIN   


 


Albuterol Sulfate  1 amp  12/28/17 16:51  12/30/17 21:04





  Ventolin 0.083% Nebulizer Soln -  NEB   1 amp





  Q4H PRN   Administration





  SHORT OF BREATH/WHEEZING   


 


Albuterol/Ipratropium  1 amp  12/28/17 18:00  01/02/18 17:24





  Duoneb -  NEB   1 amp





  QIDR LIV   Administration


 


Amino Acids  30 ml  12/29/17 17:30  01/02/18 17:04





  Prosource No Carb Liquid Pkt  PO   30 ml





  BID@0800,1730 LIV   Administration


 


Heparin Sodium (Porcine)  5,000 unit  12/28/17 22:00  01/02/18 14:19





  Heparin -  SQ   5,000 unit





  TID LIV   Administration


 


Piperacillin Sod/Tazobactam  100 mls @ 200 mls/hr  12/28/17 18:00  01/02/18 17:

04





  Sod 3.375 gm/ Dextrose  IVPB   200 mls/hr





  Q8H-IV LIV   Administration


 


Vancomycin HCl 1,000 mg/  250 mls @ 166.667 mls/hr  12/31/17 10:00  01/02/18 11:

42





  Dextrose  IVPB   166.667 mls/hr





  DAILY LIV   Administration


 


Multivitamins/Minerals/Vitamin C  1 tab  12/29/17 10:00  01/02/18 10:05





  Tab-A-Vit -  PO   1 tab





  DAILY LIV   Administration


 


Pantoprazole Sodium  40 mg  12/31/17 10:00  01/02/18 10:05





  Protonix -  PO   40 mg





  DAILY LIV   Administration


 


Prednisone  30 mg  01/02/18 10:00  01/02/18 10:05





  Deltasone -  PO   30 mg





  DAILY LIV   Administration








 Laboratory Results - last 24 hr











  01/02/18 01/02/18





  09:05 09:05


 


Sodium   140


 


Potassium   3.9


 


Chloride   97 L


 


Carbon Dioxide   37 H


 


Anion Gap   6 L


 


BUN   16


 


Creatinine   0.4 L


 


Random Glucose   103


 


Calcium   9.5


 


Vancomycin Pre-Dose  10.663 H D 











 Microbiology





12/27/17 09:15   Blood - Peripheral Venous   Blood Culture - Final


                            NO GROWTH AFTER 5 DAYS INCUBATION


12/27/17 09:15   Blood - Peripheral Venous   Blood Culture - Final


                            NO GROWTH AFTER 5 DAYS INCUBATION


12/27/17 22:52   Urine - Urine Clean Catch   Urine Culture - Final


                            NO GROWTH OBTAINED


12/28/17 18:15   Urine For Antigen Detection   Legionella Antigen - Final


12/28/17 18:15   Urine For Antigen Detection   Streptococcus pneumoniae Antigen 

(M - Final


12/27/17 17:30   Nasopharyngeal Swab   Influenza Types A,B Antigen (JASION) - Final


12/27/17 17:30   Nasopharyngeal Swab    - Final








ASSESSMENT AND PLAN:


86 year old male with a PMHx of HTN, COPD (3L NC at night), Osteoarthritis, 

Dysphagia, PUD presented to the ER with acute hypoxic respiratory distress


1. Acute hypoxic hypercapnic respiratory distress- due to possible PNA vs acute 

on chronic COPD. saturating 90% on 4L NC. clinically improved. less tachypnic. 

on prednisone taper. reduce dose tomorrow to 30mg. Supplemental oxygen to 

maintain spO2 >88%. pulmonary on board. check pre and post prior to discharge


2. Sepsis due to PNA- afebrile. leukocytosis trending down. tolerating puree 

diet. on Vanco/zosyn day 6/7. vanco trough noted. flu and legionella neg ID on 

board


Also s/p lasix x 2 since admission, CXR with marked improvement, unlikely 

infiltrate related, check BNP, follow up 2D echo. additional lasix prn based on 

oxygenation and volume status. 


3. Hypophosphatemia- resolved


4. hypokalemia-resolved


4. HTN- currently normotensive. not on medications at home. cont to monitor. 


5. PUD- no sings of bleeding. stress ulcer ppx. protonix po


6. DVT ppx- hep sq


7. family decided to make status DNR/DNI, no central line or pressors. will 

need JOE on discharge, PT eval and OOB

## 2018-01-03 LAB
ANION GAP SERPL CALC-SCNC: 7 MMOL/L (ref 8–16)
BUN SERPL-MCNC: 19 MG/DL (ref 7–18)
CALCIUM SERPL-MCNC: 8.9 MG/DL (ref 8.5–10.1)
CHLORIDE SERPL-SCNC: 96 MMOL/L (ref 98–107)
CO2 SERPL-SCNC: 37 MMOL/L (ref 21–32)
CREAT SERPL-MCNC: 0.4 MG/DL (ref 0.7–1.3)
GLUCOSE SERPL-MCNC: 90 MG/DL (ref 74–106)
POTASSIUM SERPLBLD-SCNC: 3.8 MMOL/L (ref 3.5–5.1)
SODIUM SERPL-SCNC: 140 MMOL/L (ref 136–145)

## 2018-01-03 RX ADMIN — PIPERACILLIN SODIUM,TAZOBACTAM SODIUM SCH MLS/HR: 3; .375 INJECTION, POWDER, FOR SOLUTION INTRAVENOUS at 09:51

## 2018-01-03 RX ADMIN — IPRATROPIUM BROMIDE AND ALBUTEROL SULFATE SCH AMP: .5; 3 SOLUTION RESPIRATORY (INHALATION) at 18:19

## 2018-01-03 RX ADMIN — Medication SCH ML: at 17:30

## 2018-01-03 RX ADMIN — PANTOPRAZOLE SODIUM SCH MG: 40 TABLET, DELAYED RELEASE ORAL at 09:51

## 2018-01-03 RX ADMIN — MULTIVITAMIN TABLET SCH TAB: TABLET at 09:51

## 2018-01-03 RX ADMIN — IPRATROPIUM BROMIDE AND ALBUTEROL SULFATE SCH: .5; 3 SOLUTION RESPIRATORY (INHALATION) at 00:00

## 2018-01-03 RX ADMIN — PREDNISONE SCH MG: 20 TABLET ORAL at 09:51

## 2018-01-03 RX ADMIN — HEPARIN SODIUM SCH UNIT: 5000 INJECTION, SOLUTION INTRAVENOUS; SUBCUTANEOUS at 22:03

## 2018-01-03 RX ADMIN — Medication SCH ML: at 08:35

## 2018-01-03 RX ADMIN — IPRATROPIUM BROMIDE AND ALBUTEROL SULFATE SCH AMP: .5; 3 SOLUTION RESPIRATORY (INHALATION) at 11:40

## 2018-01-03 RX ADMIN — PIPERACILLIN SODIUM,TAZOBACTAM SODIUM SCH MLS/HR: 3; .375 INJECTION, POWDER, FOR SOLUTION INTRAVENOUS at 01:23

## 2018-01-03 RX ADMIN — HEPARIN SODIUM SCH UNIT: 5000 INJECTION, SOLUTION INTRAVENOUS; SUBCUTANEOUS at 13:31

## 2018-01-03 RX ADMIN — IPRATROPIUM BROMIDE AND ALBUTEROL SULFATE SCH AMP: .5; 3 SOLUTION RESPIRATORY (INHALATION) at 05:36

## 2018-01-03 RX ADMIN — HEPARIN SODIUM SCH UNIT: 5000 INJECTION, SOLUTION INTRAVENOUS; SUBCUTANEOUS at 06:27

## 2018-01-03 RX ADMIN — IPRATROPIUM BROMIDE AND ALBUTEROL SULFATE SCH AMP: .5; 3 SOLUTION RESPIRATORY (INHALATION) at 23:22

## 2018-01-03 RX ADMIN — PIPERACILLIN SODIUM,TAZOBACTAM SODIUM SCH MLS/HR: 3; .375 INJECTION, POWDER, FOR SOLUTION INTRAVENOUS at 17:30

## 2018-01-03 RX ADMIN — VANCOMYCIN HYDROCHLORIDE SCH MLS/HR: 1 INJECTION, POWDER, LYOPHILIZED, FOR SOLUTION INTRAVENOUS at 09:51

## 2018-01-03 NOTE — PN
Teaching Attending Note


Name of Resident: Earl Dhaliwal





ATTENDING PHYSICIAN STATEMENT


Time of evaluation: 11:15 AM


I saw and evaluated the patient.


I reviewed the resident's note and discussed the case with the resident.


I agree with the resident's findings and plan as documented.








SUBJECTIVE:


Patient seen and examined. No complaints. limited ROS given dementia. 





OBJECTIVE:


 Vital Signs











 Period  Temp  Pulse  Resp  BP Sys/Penaloza  Pulse Ox


 


 Last 24 Hr  97.8 F-98.4 F  70-98  20-20  119-139/63-90  90-93








 Intake & Output











 12/31/17 01/01/18 01/02/18 01/03/18





 23:59 23:59 23:59 23:59


 


Intake Total 450 510 410 518


 


Output Total 900 1050 600 200


 


Balance -450 -540 -190 318


 


Weight  170 lb 8 oz  1626 lb 8 oz








General: lying in bed in mild respiratory distress, mild use of acessory 

muscles of respiration 


Chest: bialteral scattered coarse rhonchi, rales bilaterally, improved air 

entry from yesterday


Extremities: trace pedal edema





 Home Medication List











 Medication  Instructions  Recorded  Confirmed  Type


 


Acetaminophen [Tylenol] 2 tab PO Q6H PRN 12/27/17 12/27/17 History


 


Albuterol 2.5/Ipratropium 0.5 1 amp NEB TID 12/27/17 12/27/17 History





[Duoneb -]    


 


Budesonide [Pulmicort 0.5 mg 1 neb PO BID 12/27/17 12/27/17 History





Nebulizer -]    


 


Ipratropium 0.02% Nebulizer 1 amp NEB Q6H PRN 12/27/17 12/27/17 History





[Atrovent 0.02% Nebulizer -]    


 


Menthol/Phenol [Cepastat Lozenge -] 1 each MM QID PRN 12/27/17 12/27/17 History


 


Metoclopramide HCl 10 mg PO TID 12/27/17 12/27/17 History


 


Prednisone 10 mg PO BID 12/27/17 12/27/17 History


 


Tramadol HCl [Ultram] 2 mg PO DAILY 12/27/17 12/27/17 History








 Active Medications











Generic Name Dose Route Start Last Admin





  Trade Name Freq  PRN Reason Stop Dose Admin


 


Acetaminophen  1,000 mg  12/28/17 16:51  





  Ofirmev Injection -  IVPB   





  Q6H PRN   





  FEVER OR PAIN   


 


Albuterol Sulfate  1 amp  12/28/17 16:51  12/30/17 21:04





  Ventolin 0.083% Nebulizer Soln -  NEB   1 amp





  Q4H PRN   Administration





  SHORT OF BREATH/WHEEZING   


 


Albuterol/Ipratropium  1 amp  12/28/17 18:00  01/03/18 11:40





  Duoneb -  NEB   1 amp





  QIDR LIV   Administration


 


Amino Acids  30 ml  12/29/17 17:30  01/03/18 17:30





  Prosource No Carb Liquid Pkt  PO   30 ml





  BID@0800,1730 LIV   Administration


 


Heparin Sodium (Porcine)  5,000 unit  12/28/17 22:00  01/03/18 13:31





  Heparin -  SQ   5,000 unit





  TID LIV   Administration


 


Piperacillin Sod/Tazobactam  100 mls @ 200 mls/hr  12/28/17 18:00  01/03/18 17:

30





  Sod 3.375 gm/ Dextrose  IVPB   200 mls/hr





  Q8H-IV [facility]IV   Administration


 


Vancomycin HCl 1,000 mg/  250 mls @ 166.667 mls/hr  12/31/17 10:00  01/03/18 09:

51





  Dextrose  IVPB   166.667 mls/hr





  DAILY LIV   Administration


 


Multivitamins/Minerals/Vitamin C  1 tab  12/29/17 10:00  01/03/18 09:51





  Tab-A-Vit -  PO   1 tab





  DAILY LIV   Administration


 


Pantoprazole Sodium  40 mg  12/31/17 10:00  01/03/18 09:51





  Protonix -  PO   40 mg





  DAILY LIV   Administration


 


Prednisone  30 mg  01/02/18 10:00  01/03/18 09:51





  Deltasone -  PO   30 mg





  DAILY LIV   Administration








 Laboratory Results - last 24 hr











  01/02/18 01/03/18 01/03/18





  09:05 06:30 06:30


 


Sodium  140  140 


 


Potassium  3.9  3.8 


 


Chloride  97 L  96 L 


 


Carbon Dioxide  37 H  37 H 


 


Anion Gap  6 L  7 L 


 


BUN  16  19 H 


 


Creatinine  0.4 L  0.4 L 


 


Random Glucose  103  90 


 


Calcium  9.5  8.9 


 


B-Natriuretic Peptide  276.83   232.80








 Microbiology





12/27/17 09:15   Blood - Peripheral Venous   Blood Culture - Final


                            NO GROWTH AFTER 5 DAYS INCUBATION


12/27/17 09:15   Blood - Peripheral Venous   Blood Culture - Final


                            NO GROWTH AFTER 5 DAYS INCUBATION


12/27/17 22:52   Urine - Urine Clean Catch   Urine Culture - Final


                            NO GROWTH OBTAINED


12/28/17 18:15   Urine For Antigen Detection   Legionella Antigen - Final


12/28/17 18:15   Urine For Antigen Detection   Streptococcus pneumoniae Antigen 

(M - Final


12/27/17 17:30   Nasopharyngeal Swab   Influenza Types A,B Antigen (JAISON) - Final


12/27/17 17:30   Nasopharyngeal Swab    - Final











ASSESSMENT AND PLAN:


86 year old male with a PMHx of HTN, COPD (3L NC at night), Osteoarthritis, 

Dysphagia, PUD presented to the ER with acute hypoxic respiratory distress


- Acute hypoxic hypercarpneic respiratory distress, due to possible PNA vs 

acute on chronic COPD


-Sepsis due to PNA


-Hypophosphateia


-Hypokalemia


-HTN


-PUD





Plan: 


Oxygenation slowly improved. still with coarse . clinically improved. less 

tachypneic. on prednisone taper. reduce dose tomorrow to 30mg. Supplemental 

oxygen to maintain spO2 >88%. pulmonary on board. check pre and post prior to 

discharge. s/p lasix x 2 on admission with improvement in CXR, unlikely 

infiltrated, follow up 2D echo, Follow up BNP. Additional lasix 20 mg IV today. 

Strict I/Os. 


Vanco/zosyn day 7/7, d/c antibiotics in AM. suspect fluid +/- micro-aspiration 

more contributory.  


Currently normotensive. not on medications at home. cont to monitor. 


Stress ulcer ppx. protonix po


[id number]


Family decided to make status DNR/DNI, no central line or pressors. will need 

JOE on discharge, PT eval and OOB, dispo planning as respiratory status improves

## 2018-01-03 NOTE — PN
Progress Note (short form)





- Note


Progress Note: 


alert


NAD











 


 Vital Signs











 Period  Temp  Pulse  Resp  BP Sys/Penaloza  Pulse Ox


 


 Last 24 Hr  97.8 F-98.4 F  70-98  20-20  119-139/63-90  90-93








cor-rrr


lungs bilateral rhonchi


abd soft,nt


ext no edema





 CBC, BMP





 01/01/18 08:20 





 01/03/18 06:30 





 Microbiology





12/27/17 09:15   Blood - Peripheral Venous   Blood Culture - Final


                            NO GROWTH AFTER 5 DAYS INCUBATION


12/27/17 09:15   Blood - Peripheral Venous   Blood Culture - Final


                            NO GROWTH AFTER 5 DAYS INCUBATION


12/27/17 22:52   Urine - Urine Clean Catch   Urine Culture - Final


                            NO GROWTH OBTAINED


12/28/17 18:15   Urine For Antigen Detection   Legionella Antigen - Final


12/28/17 18:15   Urine For Antigen Detection   Streptococcus pneumoniae Antigen 

(M - Final


12/27/17 17:30   Nasopharyngeal Swab   Influenza Types A,B Antigen (JAISON) - Final


12/27/17 17:30   Nasopharyngeal Swab    - Final





a/p





pneumonia (HAP)


COPD


?CHF-improved with lasix








vanco/zosyn day #7 of 7-d/c antibiotics in am


please call back if needed














Problem List





- Problems


(1) Pneumonia


Code(s): J18.9 - PNEUMONIA, UNSPECIFIED ORGANISM   


Qualifiers: 


   Pneumonia type: due to unspecified organism   Laterality: left   Lung 

location: lower lobe of lung   Qualified Code(s): J18.1 - Lobar pneumonia, 

unspecified organism   





(2) Respiratory failure


Code(s): J96.90 - RESPIRATORY FAILURE, UNSP, UNSP W HYPOXIA OR HYPERCAPNIA   





(3) COPD (chronic obstructive pulmonary disease)


Code(s): J44.9 - CHRONIC OBSTRUCTIVE PULMONARY DISEASE, UNSPECIFIED   


Qualifiers: 


   COPD type: COPD with acute lower respiratory infection   Qualified Code(s): 

J44.0 - Chronic obstructive pulmonary disease with acute lower respiratory 

infection

## 2018-01-03 NOTE — PN
Physical Exam: 


SUBJECTIVE: Patient seen and examined at bedside. Patient is breathing 

comfortably with his nasal cannula partially off his face when examined. 

Patient denies chest pain, shortness of breath, nausea, vomiting, diarrhea.








OBJECTIVE:





 Vital Signs











 Period  Temp  Pulse  Resp  BP Sys/Penaloza  Pulse Ox


 


 Last 24 Hr  97.8 F-98.4 F  70-98  20-20  119-139/63-90  90-93











GENERAL: The patient is awake, alert, and fully oriented, in no acute distress.


LUNGS: Breath sounds equal but course, crackles noted bilaterally at the lung 

bases, mild wheezes noted bilaterally on exam, accessory muscle use. Saturating 

at 90% on partially-attached nasal cannula @ 4L.


HEART: Regular rate and rhythm, S1, S2 without murmur, rub or gallop.


ABDOMEN: Soft, nontender, nondistended, normoactive bowel sounds, no guarding, 

no rebound, no hepatosplenomegaly, no masses.


EXTREMITIES: 2+ pulses, warm, well-perfused, no edema. 


NEUROLOGICAL: Cranial nerves II through XII grossly intact. Normal speech, gait 

not observed.








 Laboratory Results - last 24 hr











  01/02/18 01/03/18 01/03/18





  09:05 06:30 06:30


 


Sodium  140  140 


 


Potassium  3.9  3.8 


 


Chloride  97 L  96 L 


 


Carbon Dioxide  37 H  37 H 


 


Anion Gap  6 L  7 L 


 


BUN  16  19 H 


 


Creatinine  0.4 L  0.4 L 


 


Random Glucose  103  90 


 


Calcium  9.5  8.9 


 


B-Natriuretic Peptide  276.83   232.80








Active Medications











Generic Name Dose Route Start Last Admin





  Trade Name Freq  PRN Reason Stop Dose Admin


 


Acetaminophen  1,000 mg  12/28/17 16:51  





  Ofirmev Injection -  IVPB   





  Q6H PRN   





  FEVER OR PAIN   


 


Albuterol Sulfate  1 amp  12/28/17 16:51  12/30/17 21:04





  Ventolin 0.083% Nebulizer Soln -  NEB   1 amp





  Q4H PRN   Administration





  SHORT OF BREATH/WHEEZING   


 


Albuterol/Ipratropium  1 amp  12/28/17 18:00  01/03/18 11:40





  Duoneb -  NEB   1 amp





  QIDR LIV   Administration


 


Amino Acids  30 ml  12/29/17 17:30  01/03/18 08:35





  Prosource No Carb Liquid Pkt  PO   30 ml





  BID@0800,1730 LIV   Administration


 


Heparin Sodium (Porcine)  5,000 unit  12/28/17 22:00  01/03/18 13:31





  Heparin -  SQ   5,000 unit





  TID LIV   Administration


 


Piperacillin Sod/Tazobactam  100 mls @ 200 mls/hr  12/28/17 18:00  01/03/18 09:

51





  Sod 3.375 gm/ Dextrose  IVPB   200 mls/hr





  Q8H-IV LIV   Administration


 


Vancomycin HCl 1,000 mg/  250 mls @ 166.667 mls/hr  12/31/17 10:00  01/03/18 09:

51





  Dextrose  IVPB   166.667 mls/hr





  DAILY LIV   Administration


 


Multivitamins/Minerals/Vitamin C  1 tab  12/29/17 10:00  01/03/18 09:51





  Tab-A-Vit -  PO   1 tab





  DAILY LIV   Administration


 


Pantoprazole Sodium  40 mg  12/31/17 10:00  01/03/18 09:51





  Protonix -  PO   40 mg





  DAILY LIV   Administration


 


Prednisone  30 mg  01/02/18 10:00  01/03/18 09:51





  Deltasone -  PO   30 mg





  DAILY LIV   Administration











ASSESSMENT/PLAN:





86 year old male with a PMHx of HTN, COPD (3L NC at night), Osteoarthritis, PUD 

presented to the ER with acute hypoxic respiratory distress.





#Acute hypoxic hypercapnic respiratory distress - PNA vs COPD vs component of 

CHF, patient afebrile


-strict I's/O's


-f/u echocardiogram


-lasix given previously helped clear patient


-continue Prednisone 30 mg PO qd 


-duonebs


-patient completing day 7/7 of abx. Stop in AM per ID recs


-blood cx negative


-O2 by nasal cannula


-f/u Pulmonary recs





#Hypokalemia - resolved


-kcl 40meq PO ONCE added today





#Hypophosphatemia


-resolved





#HTN 


-monitor, not on meds





#Peptic Ulcer Disease


-cont Protonix PO





#Prophylaxis


-Heparin subq 5000 TID





#Disposition:


Med surg


Pt is DNR/DNI








Visit type





- Emergency Visit


Emergency Visit: No





- New Patient


This patient is new to me today: No





- Critical Care


Critical Care patient: No

## 2018-01-03 NOTE — PN
Progress Note (short form)





- Note


Progress Note: 





PULMONARY





AWAKE/ALERT/AFEBRILE





PALE/ANICTERIC


BIBASILAR DIMINISHED BREATH SOUNDS


S1S2


BS+


HEEL OFF- NO EDEMA





LABS/BARIUM SWALLOW/IMAGES/MEDS REVIEWED





Hypoxemic respiratory failure


Multilobar PNA 


Fluid overload 


Acute COPD exacerbation 


Troponin leak likely demand ischemia 


HTN


Recent GIB





-ABX per ID 


-BD TX 


-Prednisone   


-heparin SubQ


-protonix for GI ppx 


-O2 to maintain saturation 


-DNR/DNI


-Aspiration precautions





BRIAN TRAN MD 














Problem List





- Problems


(1) COPD (chronic obstructive pulmonary disease)


Code(s): J44.9 - CHRONIC OBSTRUCTIVE PULMONARY DISEASE, UNSPECIFIED   


Qualifiers: 


   COPD type: COPD with acute lower respiratory infection   Qualified Code(s): 

J44.0 - Chronic obstructive pulmonary disease with acute lower respiratory 

infection   





(2) Pneumonia


Code(s): J18.9 - PNEUMONIA, UNSPECIFIED ORGANISM   


Qualifiers: 


   Pneumonia type: due to unspecified organism   Laterality: left   Lung 

location: lower lobe of lung   Qualified Code(s): J18.1 - Lobar pneumonia, 

unspecified organism   





(3) Anemia


Code(s): D64.9 - ANEMIA, UNSPECIFIED   





(4) Sepsis


Code(s): A41.9 - SEPSIS, UNSPECIFIED ORGANISM   


Qualifiers: 


   Sepsis type: sepsis due to unspecified organism   Qualified Code(s): A41.9 - 

Sepsis, unspecified organism   





(5) Hypertension


Code(s): I10 - ESSENTIAL (PRIMARY) HYPERTENSION   





(6) Chronic steroid use


Code(s): KYQ7712 -

## 2018-01-04 LAB
ANION GAP SERPL CALC-SCNC: 6 MMOL/L (ref 8–16)
BUN SERPL-MCNC: 21 MG/DL (ref 7–18)
CALCIUM SERPL-MCNC: 9 MG/DL (ref 8.5–10.1)
CHLORIDE SERPL-SCNC: 96 MMOL/L (ref 98–107)
CO2 SERPL-SCNC: 38 MMOL/L (ref 21–32)
CREAT SERPL-MCNC: 0.4 MG/DL (ref 0.7–1.3)
DEPRECATED RDW RBC AUTO: 16.8 % (ref 11.9–15.9)
GLUCOSE SERPL-MCNC: 105 MG/DL (ref 74–106)
HCT VFR BLD CALC: 41.8 % (ref 35.4–49)
HGB BLD-MCNC: 12.9 GM/DL (ref 11.7–16.9)
MCH RBC QN AUTO: 28.8 PG (ref 25.7–33.7)
MCHC RBC AUTO-ENTMCNC: 30.9 G/DL (ref 32–35.9)
MCV RBC: 93.3 FL (ref 80–96)
PLATELET # BLD AUTO: 454 K/MM3 (ref 134–434)
PMV BLD: 8.3 FL (ref 7.5–11.1)
POTASSIUM SERPLBLD-SCNC: 3.4 MMOL/L (ref 3.5–5.1)
RBC # BLD AUTO: 4.48 M/MM3 (ref 4–5.6)
SODIUM SERPL-SCNC: 140 MMOL/L (ref 136–145)
WBC # BLD AUTO: 9.2 K/MM3 (ref 4–10)

## 2018-01-04 RX ADMIN — MULTIVITAMIN TABLET SCH TAB: TABLET at 10:02

## 2018-01-04 RX ADMIN — HEPARIN SODIUM SCH UNIT: 5000 INJECTION, SOLUTION INTRAVENOUS; SUBCUTANEOUS at 14:31

## 2018-01-04 RX ADMIN — HEPARIN SODIUM SCH UNIT: 5000 INJECTION, SOLUTION INTRAVENOUS; SUBCUTANEOUS at 22:29

## 2018-01-04 RX ADMIN — PREDNISONE SCH MG: 20 TABLET ORAL at 10:03

## 2018-01-04 RX ADMIN — PIPERACILLIN SODIUM,TAZOBACTAM SODIUM SCH MLS/HR: 3; .375 INJECTION, POWDER, FOR SOLUTION INTRAVENOUS at 01:47

## 2018-01-04 RX ADMIN — Medication SCH ML: at 16:53

## 2018-01-04 RX ADMIN — IPRATROPIUM BROMIDE AND ALBUTEROL SULFATE SCH AMP: .5; 3 SOLUTION RESPIRATORY (INHALATION) at 18:20

## 2018-01-04 RX ADMIN — HEPARIN SODIUM SCH UNIT: 5000 INJECTION, SOLUTION INTRAVENOUS; SUBCUTANEOUS at 05:56

## 2018-01-04 RX ADMIN — IPRATROPIUM BROMIDE AND ALBUTEROL SULFATE SCH AMP: .5; 3 SOLUTION RESPIRATORY (INHALATION) at 12:30

## 2018-01-04 RX ADMIN — IPRATROPIUM BROMIDE AND ALBUTEROL SULFATE SCH AMP: .5; 3 SOLUTION RESPIRATORY (INHALATION) at 06:29

## 2018-01-04 RX ADMIN — PANTOPRAZOLE SODIUM SCH MG: 40 TABLET, DELAYED RELEASE ORAL at 10:02

## 2018-01-04 RX ADMIN — IPRATROPIUM BROMIDE AND ALBUTEROL SULFATE SCH AMP: .5; 3 SOLUTION RESPIRATORY (INHALATION) at 23:11

## 2018-01-04 RX ADMIN — Medication SCH ML: at 08:00

## 2018-01-04 RX ADMIN — ALBUTEROL SULFATE PRN AMP: 2.5 SOLUTION RESPIRATORY (INHALATION) at 10:11

## 2018-01-04 NOTE — PN
Physical Exam: 


SUBJECTIVE: No acute events overnight. No complaints. Denies SOB, CP/discomfort

, fevers/chills.





OBJECTIVE:





 Vital Signs











 Period  Temp  Pulse  Resp  BP Sys/Penaloza  Pulse Ox


 


 Last 24 Hr  97.5 F-98.4 F  76-98  18-20  115-134/63-78  93-95











GENERAL: NAD, awake, alert, laying in bed, thin-appearing.


HEENT: No JVD, sclera anicteric, EOMI, moist mucosa


LUNGS: Coarse breath sounds anteriorly with component of bibasillar rales noted

, No wheezes. No accessory muscle use. Currently on 4LNC.  


HEART: RRR, S1, S2 with 3/6 systolic murmur at LLSB auscultated


ABDOMEN: Soft, nontender, nondistended, normoactive bowel sounds, no guarding, 

no hepatomegaly


EXTREMITIES: 2+ DP pulses, warm, well-perfused, no edema b/l


NEUROLOGICAL: Normal speech, gait not observed.


SKIN: Warm, no rashes noted

















 Laboratory Results - last 24 hr











  01/03/18 01/03/18





  06:30 06:30


 


Sodium  140 


 


Potassium  3.8 


 


Chloride  96 L 


 


Carbon Dioxide  37 H 


 


Anion Gap  7 L 


 


BUN  19 H 


 


Creatinine  0.4 L 


 


Random Glucose  90 


 


Calcium  8.9 


 


B-Natriuretic Peptide   232.80








Active Medications











Generic Name Dose Route Start Last Admin





  Trade Name Freq  PRN Reason Stop Dose Admin


 


Acetaminophen  1,000 mg  12/28/17 16:51  





  Ofirmev Injection -  IVPB   





  Q6H PRN   





  FEVER OR PAIN   


 


Albuterol Sulfate  1 amp  12/28/17 16:51  12/30/17 21:04





  Ventolin 0.083% Nebulizer Soln -  NEB   1 amp





  Q4H PRN   Administration





  SHORT OF BREATH/WHEEZING   


 


Albuterol/Ipratropium  1 amp  12/28/17 18:00  01/04/18 06:29





  Duoneb -  NEB   1 amp





  QIDR GABY   Administration


 


Amino Acids  30 ml  12/29/17 17:30  01/03/18 17:30





  Prosource No Carb Liquid Pkt  PO   30 ml





  BID@0800,1730 GABY   Administration


 


Heparin Sodium (Porcine)  5,000 unit  12/28/17 22:00  01/04/18 05:56





  Heparin -  SQ   5,000 unit





  TID GABY   Administration


 


Multivitamins/Minerals/Vitamin C  1 tab  12/29/17 10:00  01/03/18 09:51





  Tab-A-Vit -  PO   1 tab





  DAILY GABY   Administration


 


Pantoprazole Sodium  40 mg  12/31/17 10:00  01/03/18 09:51





  Protonix -  PO   40 mg





  DAILY GABY   Administration


 


Prednisone  30 mg  01/02/18 10:00  01/03/18 09:51





  Deltasone -  PO   30 mg





  DAILY GABY   Administration











ASSESSMENT/PLAN:


85yo M with h/o COPD (baseline 3LNC at night), HTN, OA who was found to acute 

hypoxic hypercapnic distress. 





1) Acute hypoxic hypercapnic respiratory distress


   --2/2 to PNA most likely with component of COPD


   --Lasix given last night


      --One more dose of Lasix 20mg IVP 


   --Continue Prednisone 30mg qdaily


      --Taper down


   --Maintain SpO2 >88-90%


   --Pulmonology on board


   --Duoneb QID gaby


   --Nebs PRN q4h


   --RPT CXR today; will assess for vascular congestion





2) Sepsis 2/2 to PNA


   --Suspected aspiration


   --Afebrile


   --downtrending leukocytosis


   --Completed 7 day course of antibiotics


      --Can discontinue today per ID





3) HTN


   --Currently controlled


   --Continue BP monitoring





FEN:


   Fluids: None; suspected hypervolemia


   Electrolyte abnormalities: Hypokalemia; repleted with liquid potassium 

chloride


   Nutrition: Dysphagia puree with honey-thick liquids





PPX:


   DVT - Heparin SQ TID





Dispo: Continue M/S; PT eval (baseline used to walk with walker prior to JOE 

admission at [facility])


Case discussed with Dr. Dale Davila, DO - Internal Medicine PGY-1








Visit type





- Emergency Visit


Emergency Visit: No





- New Patient


This patient is new to me today: No





- Critical Care


Critical Care patient: No

## 2018-01-04 NOTE — PN
Progress Note (short form)





- Note


Progress Note: 





PULMONARY





RESTING COMFORTABLY





PALE/ANICTERIC


BIBASILAR DIMINISHED BREATH SOUNDS


S1S2


BS+


HEEL OFF- NO EDEMA





LABS/BARIUM SWALLOW/IMAGES/MEDS REVIEWED





Hypoxemic respiratory failure


Multilobar PNA 


Fluid overload 


Acute COPD exacerbation 


Troponin leak likely demand ischemia 


HTN


Recent GIB





-ABX per ID 


-BD TX 


-Prednisone   


-heparin SubQ


-protonix for GI ppx 


-O2 to maintain saturation 


-DNR/DNI


-Aspiration precautions





BRIAN TRAN MD 














Problem List





- Problems


(1) COPD (chronic obstructive pulmonary disease)


Code(s): J44.9 - CHRONIC OBSTRUCTIVE PULMONARY DISEASE, UNSPECIFIED   


Qualifiers: 


   COPD type: COPD with acute lower respiratory infection   Qualified Code(s): 

J44.0 - Chronic obstructive pulmonary disease with acute lower respiratory 

infection   





(2) Pneumonia


Code(s): J18.9 - PNEUMONIA, UNSPECIFIED ORGANISM   


Qualifiers: 


   Pneumonia type: due to unspecified organism   Laterality: left   Lung 

location: lower lobe of lung   Qualified Code(s): J18.1 - Lobar pneumonia, 

unspecified organism   





(3) Anemia


Code(s): D64.9 - ANEMIA, UNSPECIFIED   





(4) Sepsis


Code(s): A41.9 - SEPSIS, UNSPECIFIED ORGANISM   


Qualifiers: 


   Sepsis type: sepsis due to unspecified organism   Qualified Code(s): A41.9 - 

Sepsis, unspecified organism   





(5) Hypertension


Code(s): I10 - ESSENTIAL (PRIMARY) HYPERTENSION   





(6) Chronic steroid use


Code(s): SGY3488 -

## 2018-01-04 NOTE — PN
Teaching Attending Note


Name of Resident: Earl Davila





ATTENDING PHYSICIAN STATEMENT


Time of evaluation: 11;10 AM


I saw and evaluated the patient.


I reviewed the resident's note and discussed the case with the resident.


I agree with the resident's findings and plan as documented.








SUBJECTIVE:


patient seen and examined. feels better, denies dsypnea, oriented to self. No 

complaints. 





OBJECTIVE:


 Vital Signs











 Period  Temp  Pulse  Resp  BP Sys/Penaloza  Pulse Ox


 


 Last 24 Hr  97.3 F-98.4 F  52-96  18-20  115-134/63-78  91-95








 Intake & Output











 01/01/18 01/02/18 01/03/18 01/04/18





 23:59 23:59 23:59 23:59


 


Intake Total 510 410 638 100


 


Output Total 7170 442 3187 800


 


Balance -540 -190 -362 -700


 


Weight 170 lb 8 oz  1626 lb 8 oz 161 lb 12.8 oz











General: lying in bed, improved from yesterday, no use of acessory muslces of 

respiration


Chest: bialteral scattered coarse rhonchi, rales bilaterally, improved air 

entry from yesterday


Extremities: trace pedal edema





 Home Medication List











 Medication  Instructions  Recorded  Confirmed  Type


 


Acetaminophen [Tylenol] 2 tab PO Q6H PRN 12/27/17 12/27/17 History


 


Albuterol 2.5/Ipratropium 0.5 1 amp NEB TID 12/27/17 12/27/17 History





[Duoneb -]    


 


Budesonide [Pulmicort 0.5 mg 1 neb PO BID 12/27/17 12/27/17 History





Nebulizer -]    


 


Ipratropium 0.02% Nebulizer 1 amp NEB Q6H PRN 12/27/17 12/27/17 History





[Atrovent 0.02% Nebulizer -]    


 


Menthol/Phenol [Cepastat Lozenge -] 1 each MM QID PRN 12/27/17 12/27/17 History


 


Metoclopramide HCl 10 mg PO TID 12/27/17 12/27/17 History


 


Prednisone 10 mg PO BID 12/27/17 12/27/17 History


 


Tramadol HCl [Ultram] 2 mg PO DAILY 12/27/17 12/27/17 History








 Active Medications











Generic Name Dose Route Start Last Admin





  Trade Name Freq  PRN Reason Stop Dose Admin


 


Acetaminophen  1,000 mg  12/28/17 16:51  





  Ofirmev Injection -  IVPB   





  Q6H PRN   





  FEVER OR PAIN   


 


Albuterol Sulfate  1 amp  12/28/17 16:51  01/04/18 10:11





  Ventolin 0.083% Nebulizer Soln -  NEB   1 amp





  Q4H PRN   Administration





  SHORT OF BREATH/WHEEZING   


 


Albuterol/Ipratropium  1 amp  12/28/17 18:00  01/04/18 12:30





  Duoneb -  NEB   1 amp





  QIDR LIV   Administration


 


Amino Acids  30 ml  12/29/17 17:30  01/04/18 08:00





  Prosource No Carb Liquid Pkt  PO   30 ml





  BID@0800,1730 LIV   Administration


 


Furosemide  20 mg  01/04/18 16:00  





  Lasix Injection -  IVPUSH  01/04/18 16:01  





  ONCE ONE   


 


Heparin Sodium (Porcine)  5,000 unit  12/28/17 22:00  01/04/18 14:31





  Heparin -  SQ   5,000 unit





  TID LIV   Administration


 


Multivitamins/Minerals/Vitamin C  1 tab  12/29/17 10:00  01/04/18 10:02





  Tab-A-Vit -  PO   1 tab





  DAILY LIV   Administration


 


Pantoprazole Sodium  40 mg  12/31/17 10:00  01/04/18 10:02





  Protonix -  PO   40 mg





  DAILY LIV   Administration


 


Prednisone  20 mg  01/04/18 11:43  





  Deltasone -  PO   





  DAILY [facility]   








 Laboratory Results - last 24 hr











  01/04/18 01/04/18 01/04/18





  06:00 06:00 06:00


 


WBC  9.2  


 


RBC  4.48  


 


Hgb  12.9  


 


Hct  41.8  


 


MCV  93.3  


 


MCH  28.8  


 


MCHC  30.9 L  


 


RDW  16.8 H  


 


Plt Count  454 H  


 


MPV  8.3  


 


Sodium   140 


 


Potassium   3.4 L 


 


Chloride   96 L 


 


Carbon Dioxide   38 H 


 


Anion Gap   6 L 


 


BUN   21 H 


 


Creatinine   0.4 L 


 


Random Glucose   105 


 


Calcium   9.0 


 


B-Natriuretic Peptide    191.44








 Microbiology





12/27/17 09:15   Blood - Peripheral Venous   Blood Culture - Final


                            NO GROWTH AFTER 5 DAYS INCUBATION


12/27/17 09:15   Blood - Peripheral Venous   Blood Culture - Final


                            NO GROWTH AFTER 5 DAYS INCUBATION


12/27/17 22:52   Urine - Urine Clean Catch   Urine Culture - Final


                            NO GROWTH OBTAINED


12/28/17 18:15   Urine For Antigen Detection   Legionella Antigen - Final


12/28/17 18:15   Urine For Antigen Detection   Streptococcus pneumoniae Antigen 

(M - Final


12/27/17 17:30   Nasopharyngeal Swab   Influenza Types A,B Antigen (JAISON) - Final


12/27/17 17:30   Nasopharyngeal Swab    - Final





2D echo limited study, results reviewed





ASSESSMENT AND PLAN:


86 year old male with a PMHx of HTN, COPD (3L NC at night), Osteoarthritis, 

Dysphagia, PUD presented to the ER with acute hypoxic respiratory distress


- Acute hypoxic hypercarpneic respiratory distress, due to possible PNA vs 

acute on chronic COPD


-Sepsis due to PNA


-Hypophosphateia


-Hypokalemia


-HTN


-PUD





Plan: 


Oxygenation slowly improved.  on prednisone taper. reduce dose tomorrow to 

30mg. Supplemental oxygen to maintain spO2 >88%. pulmonary on board. check pre 

and post prior to discharge. Gentle diuresis as tolerated, responding slowly. 

Additional lasix 20 mg IV x 1, 2D echo limited, results reviewed. Strict I/Os. 


s/p 7 days of Vanco/zosyn. suspect fluid +/- micro-aspiration more 

contributory.  


Currently normotensive. not on medications at home. cont to monitor. 


Stress ulcer ppx. protonix po


DVTPPX


Family decided to make status DNR/DNI, no central line or pressors. will need 

JOE on discharge, PT eval and OOB, dispo planning as respiratory status improves


OOB to Chair, PT eval, dispo planning over next 2-3 days a continues to 

improve.

## 2018-01-05 LAB
ANION GAP SERPL CALC-SCNC: 9 MMOL/L (ref 8–16)
BUN SERPL-MCNC: 24 MG/DL (ref 7–18)
CALCIUM SERPL-MCNC: 9.4 MG/DL (ref 8.5–10.1)
CHLORIDE SERPL-SCNC: 97 MMOL/L (ref 98–107)
CO2 SERPL-SCNC: 36 MMOL/L (ref 21–32)
CREAT SERPL-MCNC: 0.4 MG/DL (ref 0.7–1.3)
DEPRECATED RDW RBC AUTO: 16.5 % (ref 11.9–15.9)
GLUCOSE SERPL-MCNC: 141 MG/DL (ref 74–106)
HCT VFR BLD CALC: 41.7 % (ref 35.4–49)
HGB BLD-MCNC: 12.7 GM/DL (ref 11.7–16.9)
MAGNESIUM SERPL-MCNC: 2.5 MG/DL (ref 1.8–2.4)
MCH RBC QN AUTO: 28.3 PG (ref 25.7–33.7)
MCHC RBC AUTO-ENTMCNC: 30.4 G/DL (ref 32–35.9)
MCV RBC: 93.3 FL (ref 80–96)
PLATELET # BLD AUTO: 370 K/MM3 (ref 134–434)
PMV BLD: 8.5 FL (ref 7.5–11.1)
POTASSIUM SERPLBLD-SCNC: 3.5 MMOL/L (ref 3.5–5.1)
RBC # BLD AUTO: 4.47 M/MM3 (ref 4–5.6)
SODIUM SERPL-SCNC: 142 MMOL/L (ref 136–145)
WBC # BLD AUTO: 11.1 K/MM3 (ref 4–10)

## 2018-01-05 RX ADMIN — HEPARIN SODIUM SCH UNIT: 5000 INJECTION, SOLUTION INTRAVENOUS; SUBCUTANEOUS at 06:05

## 2018-01-05 RX ADMIN — PANTOPRAZOLE SODIUM SCH MG: 40 TABLET, DELAYED RELEASE ORAL at 09:49

## 2018-01-05 RX ADMIN — IPRATROPIUM BROMIDE AND ALBUTEROL SULFATE SCH AMP: .5; 3 SOLUTION RESPIRATORY (INHALATION) at 23:06

## 2018-01-05 RX ADMIN — HEPARIN SODIUM SCH UNIT: 5000 INJECTION, SOLUTION INTRAVENOUS; SUBCUTANEOUS at 22:42

## 2018-01-05 RX ADMIN — HEPARIN SODIUM SCH UNIT: 5000 INJECTION, SOLUTION INTRAVENOUS; SUBCUTANEOUS at 14:19

## 2018-01-05 RX ADMIN — IPRATROPIUM BROMIDE AND ALBUTEROL SULFATE SCH AMP: .5; 3 SOLUTION RESPIRATORY (INHALATION) at 11:01

## 2018-01-05 RX ADMIN — Medication SCH ML: at 16:47

## 2018-01-05 RX ADMIN — IPRATROPIUM BROMIDE AND ALBUTEROL SULFATE SCH AMP: .5; 3 SOLUTION RESPIRATORY (INHALATION) at 06:35

## 2018-01-05 RX ADMIN — Medication SCH ML: at 07:31

## 2018-01-05 RX ADMIN — MULTIVITAMIN TABLET SCH TAB: TABLET at 09:49

## 2018-01-05 RX ADMIN — IPRATROPIUM BROMIDE AND ALBUTEROL SULFATE SCH AMP: .5; 3 SOLUTION RESPIRATORY (INHALATION) at 17:37

## 2018-01-05 RX ADMIN — PREDNISONE SCH MG: 20 TABLET ORAL at 09:49

## 2018-01-05 RX ADMIN — POLYETHYLENE GLYCOL 3350 SCH GRAMS: 17 POWDER, FOR SOLUTION ORAL at 13:41

## 2018-01-05 NOTE — PN
Physical Exam: 


SUBJECTIVE: Comfortable looking. no new complaints today. no events overnight. 

hpi limited due to dementia





OBJECTIVE:





 Vital Signs











 Period  Temp  Pulse  Resp  BP Sys/Penaloza  Pulse Ox


 


 Last 24 Hr  97.2 F-98.3 F  84-93  18-20  123-159/59-82  91-93











GENERAL: NAD, awake, alert


HEENT: NC/AT, No JVD


LUNGS: Improved since yesterday. Minimal rhonchi, bibasillar rales. No wheezes. 

No accessory muscle use.


HEART: Regular rate and rhythm, S1, S2 without murmur, rub or gallop.


ABDOMEN: Soft, nontender, nondistended, normoactive bowel sounds, no 

hepatomegaly.


EXTREMITIES: 2+ DP pulses no edema. 


SKIN: Warm, no rashes or lesions noted














 Laboratory Results - last 24 hr











  01/05/18 01/05/18





  06:00 06:00


 


WBC  11.1 H 


 


RBC  4.47 


 


Hgb  12.7 


 


Hct  41.7 


 


MCV  93.3 


 


MCH  28.3 


 


MCHC  30.4 L 


 


RDW  16.5 H 


 


Plt Count  370 


 


MPV  8.5 


 


Sodium   142


 


Potassium   3.5


 


Chloride   97 L


 


Carbon Dioxide   36 H


 


Anion Gap   9


 


BUN   24 H


 


Creatinine   0.4 L


 


Random Glucose   141 H D


 


Calcium   9.4


 


Magnesium   2.5 H D








Active Medications











Generic Name Dose Route Start Last Admin





  Trade Name Freq  PRN Reason Stop Dose Admin


 


Acetaminophen  1,000 mg  12/28/17 16:51  





  Ofirmev Injection -  IVPB   





  Q6H PRN   





  FEVER OR PAIN   


 


Albuterol Sulfate  1 amp  12/28/17 16:51  01/04/18 10:11





  Ventolin 0.083% Nebulizer Soln -  NEB   1 amp





  Q4H PRN   Administration





  SHORT OF BREATH/WHEEZING   


 


Albuterol/Ipratropium  1 amp  12/28/17 18:00  01/05/18 17:37





  Duoneb -  NEB   1 amp





  QIDR GABY   Administration


 


Amino Acids  30 ml  12/29/17 17:30  01/05/18 16:47





  Prosource No Carb Liquid Pkt  PO   30 ml





  BID@0800,1730 GABY   Administration


 


Heparin Sodium (Porcine)  5,000 unit  12/28/17 22:00  01/05/18 14:19





  Heparin -  SQ   5,000 unit





  TID GABY   Administration


 


Multivitamins/Minerals/Vitamin C  1 tab  12/29/17 10:00  01/05/18 09:49





  Tab-A-Vit -  PO   1 tab





  DAILY GABY   Administration


 


Pantoprazole Sodium  40 mg  12/31/17 10:00  01/05/18 09:49





  Protonix -  PO   40 mg





  DAILY GABY   Administration


 


Polyethylene Glycol  17 gm  01/05/18 13:00  01/05/18 13:41





  Miralax (For Daily Use) -  PO   17 grams





  DAILY GABY   Administration


 


Prednisone  20 mg  01/04/18 11:43  01/05/18 09:49





  Deltasone -  PO   20 mg





  DAILY GABY   Administration











ASSESSMENT/PLAN:


85yo M with h/o COPD (baseline 3LNC at night), HTN, OA who was found to acute 

hypoxic hypercapnic distress. 


1) Acute hypoxic hypercapnic respiratory distress


   --2/2 to PNA most likely with component of COPD


   --No lasix today


   --Continue Prednisone 30mg qdaily


      --Taper down tomorrow?


   --Maintain SpO2 >88-90%


   --Pulmonology on board


   --Duoneb QID gaby


   --Nebs PRN q4h


   --RPT CXR today; will assess for vascular congestion





2) Sepsis 2/2 to PNA


   --Suspected aspiration


      --Possibly micro-aspiration still


   --Afebrile


   --downtrending leukocytosis


   --Completed 7 day course of antibiotics


      --Can discontinue today per ID





3) HTN


   --Currently controlled


   --Continue BP monitoring





FEN:


   Fluids: None; suspected hypervolemia


   Electrolyte abnormalities: Hypokalemia; repleted with liquid potassium 

chloride


   Nutrition: Dysphagia puree with honey-thick liquids





PPX:


   DVT - Heparin SQ TID





Dispo: Continue M/S, will need pre and post prior to discharge


Case discussed with Dr. Dale Davila, DO - Internal Medicine PGY-1





Visit type





- Emergency Visit


Emergency Visit: No





- New Patient


This patient is new to me today: No





- Critical Care


Critical Care patient: No

## 2018-01-05 NOTE — PN
Teaching Attending Note


Name of Resident: Earl Davila





ATTENDING PHYSICIAN STATEMENT


Time of evaluation: 11;25 AM


I saw and evaluated the patient.


I reviewed the resident's note and discussed the case with the resident.


I agree with the resident's findings and plan as documented.








SUBJECTIVE:


Patient seen and examined. looks better today, no complaints. limited ROS given 

dementia. 





OBJECTIVE:


 Vital Signs











 Period  Temp  Pulse  Resp  BP Sys/Penaloza  Pulse Ox


 


 Last 24 Hr  97.2 F-98.3 F  83-93  18-20  114-159/59-82  91-93








 Intake & Output











 01/02/18 01/03/18 01/04/18 01/05/18





 23:59 23:59 23:59 23:59


 


Intake Total 410 638 330 500


 


Output Total 600 1000 1300 500


 


Balance -190 -362 -970 0


 


Weight  1626 lb 8 oz 161 lb 12.8 oz 160 lb 12.8 oz








General: sitting in bed, comfortable today


Chest: improved air entry, minimal rhonchi today, few right basilar rales


Abdomen: soft, NT, ND, positive bowel sounds


extremities: no edema





 Home Medication List











 Medication  Instructions  Recorded  Confirmed  Type


 


Acetaminophen [Tylenol] 2 tab PO Q6H PRN 12/27/17 12/27/17 History


 


Albuterol 2.5/Ipratropium 0.5 1 amp NEB TID 12/27/17 12/27/17 History





[Duoneb -]    


 


Budesonide [Pulmicort 0.5 mg 1 neb PO BID 12/27/17 12/27/17 History





Nebulizer -]    


 


Ipratropium 0.02% Nebulizer 1 amp NEB Q6H PRN 12/27/17 12/27/17 History





[Atrovent 0.02% Nebulizer -]    


 


Menthol/Phenol [Cepastat Lozenge -] 1 each MM QID PRN 12/27/17 12/27/17 History


 


Metoclopramide HCl 10 mg PO TID 12/27/17 12/27/17 History


 


Prednisone 10 mg PO BID 12/27/17 12/27/17 History


 


Tramadol HCl [Ultram] 2 mg PO DAILY 12/27/17 12/27/17 History








 Active Medications











Generic Name Dose Route Start Last Admin





  Trade Name Freq  PRN Reason Stop Dose Admin


 


Acetaminophen  1,000 mg  12/28/17 16:51  





  Ofirmev Injection -  IVPB   





  Q6H PRN   





  FEVER OR PAIN   


 


Albuterol Sulfate  1 amp  12/28/17 16:51  01/04/18 10:11





  Ventolin 0.083% Nebulizer Soln -  NEB   1 amp





  Q4H PRN   Administration





  SHORT OF BREATH/WHEEZING   


 


Albuterol/Ipratropium  1 amp  12/28/17 18:00  01/05/18 17:37





  Duoneb -  NEB   1 amp





  QIDR LIV   Administration


 


Amino Acids  30 ml  12/29/17 17:30  01/05/18 16:47





  Prosource No Carb Liquid Pkt  PO   30 ml





  BID@0800,1730 LIV   Administration


 


Heparin Sodium (Porcine)  5,000 unit  12/28/17 22:00  01/05/18 14:19





  Heparin -  SQ   5,000 unit





  TID LIV   Administration


 


Multivitamins/Minerals/Vitamin C  1 tab  12/29/17 10:00  01/05/18 09:49





  Tab-A-Vit -  PO   1 tab





  DAILY LIV   Administration


 


Pantoprazole Sodium  40 mg  12/31/17 10:00  01/05/18 09:49





  Protonix -  PO   40 mg





  DAILY LIV   Administration


 


Polyethylene Glycol  17 gm  01/05/18 13:00  01/05/18 13:41





  Miralax (For Daily Use) -  PO   17 grams





  DAILY LIV   Administration


 


Prednisone  20 mg  01/04/18 11:43  01/05/18 09:49





  Deltasone -  PO   20 mg





  DAILY LIV   Administration








 Laboratory Results - last 24 hr











  01/05/18 01/05/18





  06:00 06:00


 


WBC  11.1 H 


 


RBC  4.47 


 


Hgb  12.7 


 


Hct  41.7 


 


MCV  93.3 


 


MCH  28.3 


 


MCHC  30.4 L 


 


RDW  16.5 H 


 


Plt Count  370 


 


MPV  8.5 


 


Sodium   142


 


Potassium   3.5


 


Chloride   97 L


 


Carbon Dioxide   36 H


 


Anion Gap   9


 


BUN   24 H


 


Creatinine   0.4 L


 


Random Glucose   141 H D


 


Calcium   9.4


 


Magnesium   2.5 H D








 Microbiology





12/27/17 09:15   Blood - Peripheral Venous   Blood Culture - Final


                            NO GROWTH AFTER 5 DAYS INCUBATION


12/27/17 09:15   Blood - Peripheral Venous   Blood Culture - Final


                            NO GROWTH AFTER 5 DAYS INCUBATION


12/27/17 22:52   Urine - Urine Clean Catch   Urine Culture - Final


                            NO GROWTH OBTAINED


12/28/17 18:15   Urine For Antigen Detection   Legionella Antigen - Final


12/28/17 18:15   Urine For Antigen Detection   Streptococcus pneumoniae Antigen 

(M - Final


12/27/17 17:30   Nasopharyngeal Swab   Influenza Types A,B Antigen (JAISON) - Final


12/27/17 17:30   Nasopharyngeal Swab    - Final











ASSESSMENT AND PLAN:


86 year old male with a PMHx of HTN, COPD (3L NC at night), Osteoarthritis, 

Dysphagia, PUD presented to the ER with acute hypoxic respiratory distress


- Acute hypoxic hypercarpneic respiratory distress, due to possible PNA vs 

acute on chronic COPD


-Sepsis due to PNA


-Hypophosphateia


-Hypokalemia


-HTN


-PUD





Plan: 


Oxygenation slowly improved.  on prednisone taper.  Supplemental oxygen to 

maintain spO2 >88%. pulmonary on board. check pre and post prior to discharge. 

Gentle diuresis as tolerated, responding slowly. Improved with gentle diuresis, 

additional lasix 20 mg PO , 2D echo limited, results reviewed. Strict I/Os. 


s/p 7 days of Vanco/zosyn. suspect fluid +/- micro-aspiration more 

contributory.  


Currently normotensive. not on medications at home. cont to monitor. 


Stress ulcer ppx. protonix po


DVTPPX


Family decided to make status DNR/DNI, no central line or pressors. will need 

JOE on discharge, PT eval and OOB, dispo planning as respiratory status improves


OOB to Chair, PT eval, dispo planning in 24-48 hours as improves.

## 2018-01-05 NOTE — PN
Progress Note, Physician


History of Present Illness: 





pulmonary





alert,comfortable,-resp distress





- Current Medication List


Current Medications: 


Active Medications





Acetaminophen (Ofirmev Injection -)  1,000 mg IVPB Q6H PRN


   PRN Reason: FEVER OR PAIN


Albuterol Sulfate (Ventolin 0.083% Nebulizer Soln -)  1 amp NEB Q4H PRN


   PRN Reason: SHORT OF BREATH/WHEEZING


   Last Admin: 01/04/18 10:11 Dose:  1 amp


Albuterol/Ipratropium (Duoneb -)  1 amp NEB QIDR [facility]


   Last Admin: 01/05/18 11:01 Dose:  1 amp


Amino Acids (Prosource No Carb Liquid Pkt)  30 ml PO BID@0800,1730 [facility]


   Last Admin: 01/05/18 07:31 Dose:  30 ml


Heparin Sodium (Porcine) (Heparin -)  5,000 unit SQ TID [facility]


   Last Admin: 01/05/18 14:19 Dose:  5,000 unit


Multivitamins/Minerals/Vitamin C (Tab-A-Vit -)  1 tab PO DAILY [facility]


   Last Admin: 01/05/18 09:49 Dose:  1 tab


Pantoprazole Sodium (Protonix -)  40 mg PO DAILY [facility]


   Last Admin: 01/05/18 09:49 Dose:  40 mg


Polyethylene Glycol (Miralax (For Daily Use) -)  17 gm PO DAILY [facility]


   Last Admin: 01/05/18 13:41 Dose:  17 grams


Prednisone (Deltasone -)  20 mg PO DAILY [facility]


   Last Admin: 01/05/18 09:49 Dose:  20 mg











- Objective


Vital Signs: 


 Vital Signs











Temperature  97.4 F L  01/05/18 14:27


 


Pulse Rate  93 H  01/05/18 14:27


 


Respiratory Rate  18   01/05/18 14:27


 


Blood Pressure  125/82   01/05/18 14:27


 


O2 Sat by Pulse Oximetry (%)  92 L  01/05/18 09:45











Constitutional: Yes: Well Nourished, Calm


Eyes: Yes: WNL


HENT: Yes: WNL


Neck: Yes: WNL


Cardiovascular: Yes: Regular Rate and Rhythm, S1, S2


Respiratory: Yes: Diminished


Gastrointestinal: Yes: Normal Bowel Sounds, Soft


Extremities: Yes: WNL


Edema: No


Labs: 


 CBC, BMP





 01/05/18 06:00 





 01/05/18 06:00 





 INR, PTT











INR  1.12  (0.82-1.09)   12/27/17  09:15    














- ....Imaging


Chest X-ray: Report Reviewed, Image Reviewed





Assessment/Plan








Problem List


- Problems


(1) COPD (chronic obstructive pulmonary disease)


Code(s): J44.9 - CHRONIC OBSTRUCTIVE PULMONARY DISEASE, UNSPECIFIED   


Qualifiers: 


   COPD type: COPD with acute lower respiratory infection   Qualified Code(s): 

J44.0 - Chronic obstructive pulmonary disease with acute lower respiratory 

infection   





(2) Hypertension


Code(s): I10 - ESSENTIAL (PRIMARY) HYPERTENSION   





(3) Pneumonia


Code(s): J18.9 - PNEUMONIA, UNSPECIFIED ORGANISM   


Qualifiers: 


   Pneumonia type: due to unspecified organism   Laterality: left   Lung 

location: lower lobe of lung   Qualified Code(s): J18.1 - Lobar pneumonia, 

unspecified organism   





(4) Respiratory failure


Code(s): J96.90 - RESPIRATORY FAILURE, UNSP, UNSP W HYPOXIA OR HYPERCAPNIA   








Assessment/Plan


Hypoxemic respiratory failure improved


R/O PNA vs fluid overload 


Acute COPD exacerbation improved


Troponin leak likely demand ischemia 


HTN


Recent GIB, now with stable Hgb














-ABX per ID 


-BD TX 


-prednisone


-heparin SubQ


-protonix for GI ppx (recent GIB)





 DR BONNER

## 2018-01-06 RX ADMIN — IPRATROPIUM BROMIDE AND ALBUTEROL SULFATE SCH AMP: .5; 3 SOLUTION RESPIRATORY (INHALATION) at 18:52

## 2018-01-06 RX ADMIN — POLYETHYLENE GLYCOL 3350 SCH GRAMS: 17 POWDER, FOR SOLUTION ORAL at 09:34

## 2018-01-06 RX ADMIN — PREDNISONE SCH MG: 20 TABLET ORAL at 09:34

## 2018-01-06 RX ADMIN — METHYLPREDNISOLONE SODIUM SUCCINATE SCH MG: 40 INJECTION, POWDER, FOR SOLUTION INTRAMUSCULAR; INTRAVENOUS at 22:11

## 2018-01-06 RX ADMIN — IPRATROPIUM BROMIDE AND ALBUTEROL SULFATE SCH AMP: .5; 3 SOLUTION RESPIRATORY (INHALATION) at 11:27

## 2018-01-06 RX ADMIN — Medication SCH: at 18:41

## 2018-01-06 RX ADMIN — HEPARIN SODIUM SCH UNIT: 5000 INJECTION, SOLUTION INTRAVENOUS; SUBCUTANEOUS at 05:31

## 2018-01-06 RX ADMIN — PANTOPRAZOLE SODIUM SCH MG: 40 TABLET, DELAYED RELEASE ORAL at 09:34

## 2018-01-06 RX ADMIN — HEPARIN SODIUM SCH UNIT: 5000 INJECTION, SOLUTION INTRAVENOUS; SUBCUTANEOUS at 22:11

## 2018-01-06 RX ADMIN — Medication SCH ML: at 09:34

## 2018-01-06 RX ADMIN — IPRATROPIUM BROMIDE AND ALBUTEROL SULFATE SCH AMP: .5; 3 SOLUTION RESPIRATORY (INHALATION) at 05:47

## 2018-01-06 RX ADMIN — HEPARIN SODIUM SCH UNIT: 5000 INJECTION, SOLUTION INTRAVENOUS; SUBCUTANEOUS at 14:00

## 2018-01-06 RX ADMIN — MULTIVITAMIN TABLET SCH TAB: TABLET at 09:34

## 2018-01-06 NOTE — PN
Teaching Attending Note


Name of Resident: .





ATTENDING PHYSICIAN STATEMENT


Time of evaluation: 8:40 AM


I saw and evaluated the patient.


I reviewed the resident's note and discussed the case with the resident.


I agree with the resident's findings and plan as documented.








SUBJECTIVE:


Patient seen and examined. No complaints, ROS limited by dementia. 





OBJECTIVE:


 Vital Signs











 Period  Temp  Pulse  Resp  BP Sys/Penaloza  Pulse Ox


 


 Last 24 Hr  97.3 F-97.6 F  82-94  18-20  118-125/67-82  93








 Intake & Output











 01/03/18 01/04/18 01/05/18 01/06/18





 23:59 23:59 23:59 23:59


 


Intake Total 638 330 500 300


 


Output Total 1000 1300 800 200


 


Balance -362 -970 -300 100


 


Weight 1626 lb 8 oz 161 lb 12.8 oz 160 lb 12.8 oz 160 lb








general; still dyspneic with minimal conversation or activity


Abdomen: soft, NT


chest; scattered rhonchi, few basilar rales


extremities: no edema





 Home Medication List











 Medication  Instructions  Recorded  Confirmed  Type


 


Acetaminophen [Tylenol] 2 tab PO Q6H PRN 12/27/17 12/27/17 History


 


Albuterol 2.5/Ipratropium 0.5 1 amp NEB TID 12/27/17 12/27/17 History





[Duoneb -]    


 


Budesonide [Pulmicort 0.5 mg 1 neb PO BID 12/27/17 12/27/17 History





Nebulizer -]    


 


Ipratropium 0.02% Nebulizer 1 amp NEB Q6H PRN 12/27/17 12/27/17 History





[Atrovent 0.02% Nebulizer -]    


 


Menthol/Phenol [Cepastat Lozenge -] 1 each MM QID PRN 12/27/17 12/27/17 History


 


Metoclopramide HCl 10 mg PO TID 12/27/17 12/27/17 History


 


Prednisone 10 mg PO BID 12/27/17 12/27/17 History


 


Tramadol HCl [Ultram] 2 mg PO DAILY 12/27/17 12/27/17 History








 Active Medications











Generic Name Dose Route Start Last Admin





  Trade Name Freq  PRN Reason Stop Dose Admin


 


Acetaminophen  1,000 mg  12/28/17 16:51  





  Ofirmev Injection -  IVPB   





  Q6H PRN   





  FEVER OR PAIN   


 


Albuterol Sulfate  1 amp  12/28/17 16:51  01/04/18 10:11





  Ventolin 0.083% Nebulizer Soln -  NEB   1 amp





  Q4H PRN   Administration





  SHORT OF BREATH/WHEEZING   


 


Albuterol/Ipratropium  1 amp  12/28/17 18:00  01/06/18 11:27





  Duoneb -  NEB   1 amp





  QIDR LIV   Administration


 


Amino Acids  30 ml  12/29/17 17:30  01/06/18 09:34





  Prosource No Carb Liquid Pkt  PO   30 ml





  BID@0800,1730 LIV   Administration


 


Heparin Sodium (Porcine)  5,000 unit  12/28/17 22:00  01/06/18 05:31





  Heparin -  SQ   5,000 unit





  TID LIV   Administration


 


Methylprednisolone Sodium Succinate  40 mg  01/06/18 22:00  





  Solu-Medrol -  IVPUSH   





  BID LIV   


 


Multivitamins/Minerals/Vitamin C  1 tab  12/29/17 10:00  01/06/18 09:34





  Tab-A-Vit -  PO   1 tab





  DAILY LIV   Administration


 


Pantoprazole Sodium  40 mg  12/31/17 10:00  01/06/18 09:34





  Protonix -  PO   40 mg





  DAILY LIV   Administration


 


Polyethylene Glycol  17 gm  01/05/18 13:00  01/06/18 09:34





  Miralax (For Daily Use) -  PO   17 grams





  DAILY LIV   Administration








 Microbiology





12/27/17 09:15   Blood - Peripheral Venous   Blood Culture - Final


                            NO GROWTH AFTER 5 DAYS INCUBATION


12/27/17 09:15   Blood - Peripheral Venous   Blood Culture - Final


                            NO GROWTH AFTER 5 DAYS INCUBATION


12/27/17 22:52   Urine - Urine Clean Catch   Urine Culture - Final


                            NO GROWTH OBTAINED


12/28/17 18:15   Urine For Antigen Detection   Legionella Antigen - Final


12/28/17 18:15   Urine For Antigen Detection   Streptococcus pneumoniae Antigen 

(M - Final


12/27/17 17:30   Nasopharyngeal Swab   Influenza Types A,B Antigen (JAISON) - Final


12/27/17 17:30   Nasopharyngeal Swab    - Final











ASSESSMENT AND PLAN:


86 year old male with a PMHx of HTN, COPD (3L NC at night), Osteoarthritis, 

Dysphagia, PUD presented to the ER with acute hypoxic respiratory distress


- Acute hypoxic hypercarpneic respiratory distress, due to possible PNA vs 

acute on chronic COPD


-Sepsis due to PNA


-Hypophosphateia


-Hypokalemia


-HTN


-PUD





Plan: 


Oxygenation overall unchanged. Volume status and gurgling have improved with 

gentle diuresis. However still dyspneic with minimal exertion and scattered 

rhonchi. ?ongoing micro-aspiration. Will place on solumedrol 40 mg IV q12h, x 

24 hours to see if clinical improvement in symptoms and oxygenation noted. 


S/p 7 days of zosyn/vancomycin. 


BUN rising,  volume status improved, hold additional diuresis.  Supplemental 

oxygen to maintain spO2 >88%. pulmonary on board. Gentle diuresis  2D echo 

limited, results reviewed. Strict I/Os. 


s/p 7 days of Vanco/zosyn. 


Currently normotensive. not on medications at home. cont to monitor. 


Stress ulcer ppx. protonix po


DVTPPX


Family decided to make status DNR/DNI, no central line or pressors. will need 

JOE on discharge, PT eval and OOB, dispo planning as respiratory status improves


OOB to Chair, PT eval, dispo planning in 24-48 hours as improves.

## 2018-01-06 NOTE — PN
Progress Note, Physician


History of Present Illness: 





pulmonary





alert,mildly dyspneic when talking





- Current Medication List


Current Medications: 


Active Medications





Acetaminophen (Ofirmev Injection -)  1,000 mg IVPB Q6H PRN


   PRN Reason: FEVER OR PAIN


Albuterol Sulfate (Ventolin 0.083% Nebulizer Soln -)  1 amp NEB Q4H PRN


   PRN Reason: SHORT OF BREATH/WHEEZING


   Last Admin: 01/04/18 10:11 Dose:  1 amp


Albuterol/Ipratropium (Duoneb -)  1 amp NEB QIDR [facility]


   Last Admin: 01/06/18 11:27 Dose:  1 amp


Amino Acids (Prosource No Carb Liquid Pkt)  30 ml PO BID@0800,1730 [facility]


   Last Admin: 01/06/18 09:34 Dose:  30 ml


Heparin Sodium (Porcine) (Heparin -)  5,000 unit SQ TID [facility]


   Last Admin: 01/06/18 05:31 Dose:  5,000 unit


Methylprednisolone Sodium Succinate (Solu-Medrol -)  40 mg IVPUSH BID [facility]


Multivitamins/Minerals/Vitamin C (Tab-A-Vit -)  1 tab PO DAILY [facility]


   Last Admin: 01/06/18 09:34 Dose:  1 tab


Pantoprazole Sodium (Protonix -)  40 mg PO DAILY [facility]


   Last Admin: 01/06/18 09:34 Dose:  40 mg


Polyethylene Glycol (Miralax (For Daily Use) -)  17 gm PO DAILY [facility]


   Last Admin: 01/06/18 09:34 Dose:  17 grams











- Objective


Vital Signs: 


 Vital Signs











Temperature  98.4 F   01/06/18 13:32


 


Pulse Rate  97 H  01/06/18 13:32


 


Respiratory Rate  18   01/06/18 13:32


 


Blood Pressure  125/66   01/06/18 13:32


 


O2 Sat by Pulse Oximetry (%)  91 L  01/06/18 09:00











Constitutional: Yes: Well Nourished, Calm


Eyes: Yes: WNL


HENT: Yes: WNL


Neck: Yes: WNL


Cardiovascular: Yes: Regular Rate and Rhythm, S1, S2


Respiratory: Yes: Rhonchi (few rhonchi anteriorly)


Gastrointestinal: Yes: Normal Bowel Sounds, Soft


Extremities: Yes: WNL


Edema: No


Labs: 


 CBC, BMP








Assessment/Plan








Problem List


- Problems


(1) COPD (chronic obstructive pulmonary disease)


Code(s): J44.9 - CHRONIC OBSTRUCTIVE PULMONARY DISEASE, UNSPECIFIED   


Qualifiers: 


   COPD type: COPD with acute lower respiratory infection   Qualified Code(s): 

J44.0 - Chronic obstructive pulmonary disease with acute lower respiratory 

infection   





(2) Hypertension


Code(s): I10 - ESSENTIAL (PRIMARY) HYPERTENSION   





(3) Pneumonia


Code(s): J18.9 - PNEUMONIA, UNSPECIFIED ORGANISM   


Qualifiers: 


   Pneumonia type: due to unspecified organism   Laterality: left   Lung 

location: lower lobe of lung   Qualified Code(s): J18.1 - Lobar pneumonia, 

unspecified organism   





(4) Respiratory failure


Code(s): J96.90 - RESPIRATORY FAILURE, UNSP, UNSP W HYPOXIA OR HYPERCAPNIA   








Assessment/Plan


Hypoxemic respiratory failure improved


R/O PNA vs fluid overload 


Acute COPD exacerbation improved


Troponin leak likely demand ischemia 


HTN


Recent GIB, now with stable Hgb














-ABX per ID 


-BD TX 


-steroids


-heparin SubQ


-O2


- ct chest





 DR BONNER

## 2018-01-07 LAB
ANION GAP SERPL CALC-SCNC: 6 MMOL/L (ref 8–16)
BASOPHILS # BLD: 0.2 % (ref 0–2)
BUN SERPL-MCNC: 21 MG/DL (ref 7–18)
CALCIUM SERPL-MCNC: 9.4 MG/DL (ref 8.5–10.1)
CHLORIDE SERPL-SCNC: 97 MMOL/L (ref 98–107)
CO2 SERPL-SCNC: 38 MMOL/L (ref 21–32)
CREAT SERPL-MCNC: 0.4 MG/DL (ref 0.7–1.3)
DEPRECATED RDW RBC AUTO: 17 % (ref 11.9–15.9)
EOSINOPHIL # BLD: 0 % (ref 0–4.5)
GLUCOSE SERPL-MCNC: 144 MG/DL (ref 74–106)
HCT VFR BLD CALC: 41.6 % (ref 35.4–49)
HGB BLD-MCNC: 12.8 GM/DL (ref 11.7–16.9)
LYMPHOCYTES # BLD: 5.6 % (ref 8–40)
MCH RBC QN AUTO: 28.7 PG (ref 25.7–33.7)
MCHC RBC AUTO-ENTMCNC: 30.9 G/DL (ref 32–35.9)
MCV RBC: 92.8 FL (ref 80–96)
MONOCYTES # BLD AUTO: 3.2 % (ref 3.8–10.2)
NEUTROPHILS # BLD: 91 % (ref 42.8–82.8)
PLATELET # BLD AUTO: 331 K/MM3 (ref 134–434)
PMV BLD: 8.8 FL (ref 7.5–11.1)
POTASSIUM SERPLBLD-SCNC: 4.2 MMOL/L (ref 3.5–5.1)
RBC # BLD AUTO: 4.48 M/MM3 (ref 4–5.6)
SODIUM SERPL-SCNC: 141 MMOL/L (ref 136–145)
WBC # BLD AUTO: 13.8 K/MM3 (ref 4–10)

## 2018-01-07 RX ADMIN — HEPARIN SODIUM SCH UNIT: 5000 INJECTION, SOLUTION INTRAVENOUS; SUBCUTANEOUS at 13:58

## 2018-01-07 RX ADMIN — IPRATROPIUM BROMIDE AND ALBUTEROL SULFATE SCH AMP: .5; 3 SOLUTION RESPIRATORY (INHALATION) at 23:02

## 2018-01-07 RX ADMIN — IPRATROPIUM BROMIDE AND ALBUTEROL SULFATE SCH AMP: .5; 3 SOLUTION RESPIRATORY (INHALATION) at 00:00

## 2018-01-07 RX ADMIN — Medication SCH ML: at 08:05

## 2018-01-07 RX ADMIN — METHYLPREDNISOLONE SODIUM SUCCINATE SCH MG: 40 INJECTION, POWDER, FOR SOLUTION INTRAMUSCULAR; INTRAVENOUS at 10:07

## 2018-01-07 RX ADMIN — IPRATROPIUM BROMIDE AND ALBUTEROL SULFATE SCH AMP: .5; 3 SOLUTION RESPIRATORY (INHALATION) at 17:50

## 2018-01-07 RX ADMIN — IPRATROPIUM BROMIDE AND ALBUTEROL SULFATE SCH AMP: .5; 3 SOLUTION RESPIRATORY (INHALATION) at 12:00

## 2018-01-07 RX ADMIN — MULTIVITAMIN TABLET SCH TAB: TABLET at 10:07

## 2018-01-07 RX ADMIN — HEPARIN SODIUM SCH UNIT: 5000 INJECTION, SOLUTION INTRAVENOUS; SUBCUTANEOUS at 21:10

## 2018-01-07 RX ADMIN — SCOPALAMINE SCH PATCH: 1 PATCH, EXTENDED RELEASE TRANSDERMAL at 13:58

## 2018-01-07 RX ADMIN — IPRATROPIUM BROMIDE AND ALBUTEROL SULFATE SCH AMP: .5; 3 SOLUTION RESPIRATORY (INHALATION) at 06:24

## 2018-01-07 RX ADMIN — PANTOPRAZOLE SODIUM SCH MG: 40 TABLET, DELAYED RELEASE ORAL at 10:07

## 2018-01-07 RX ADMIN — HEPARIN SODIUM SCH UNIT: 5000 INJECTION, SOLUTION INTRAVENOUS; SUBCUTANEOUS at 06:27

## 2018-01-07 RX ADMIN — Medication SCH ML: at 18:30

## 2018-01-07 RX ADMIN — METHYLPREDNISOLONE SODIUM SUCCINATE SCH MG: 40 INJECTION, POWDER, FOR SOLUTION INTRAMUSCULAR; INTRAVENOUS at 21:10

## 2018-01-07 RX ADMIN — POLYETHYLENE GLYCOL 3350 SCH GRAMS: 17 POWDER, FOR SOLUTION ORAL at 10:07

## 2018-01-07 NOTE — PN
Progress Note, Physician


History of Present Illness: 





pulmonary





alert,no change,mildly dyspneic when speaking





- Current Medication List


Current Medications: 


Active Medications





Acetaminophen (Ofirmev Injection -)  1,000 mg IVPB Q6H PRN


   PRN Reason: FEVER OR PAIN


Albuterol Sulfate (Ventolin 0.083% Nebulizer Soln -)  1 amp NEB Q4H PRN


   PRN Reason: SHORT OF BREATH/WHEEZING


   Last Admin: 01/04/18 10:11 Dose:  1 amp


Albuterol/Ipratropium (Duoneb -)  1 amp NEB QIDR [facility]


   Last Admin: 01/07/18 12:00 Dose:  1 amp


Amino Acids (Prosource No Carb Liquid Pkt)  30 ml PO BID@0800,1730 [facility]


   Last Admin: 01/07/18 08:05 Dose:  30 ml


Heparin Sodium (Porcine) (Heparin -)  5,000 unit SQ TID [facility]


   Last Admin: 01/07/18 06:27 Dose:  5,000 unit


Methylprednisolone Sodium Succinate (Solu-Medrol -)  40 mg IVPUSH BID [facility]


   Last Admin: 01/07/18 10:07 Dose:  40 mg


Multivitamins/Minerals/Vitamin C (Tab-A-Vit -)  1 tab PO DAILY [facility]


   Last Admin: 01/07/18 10:07 Dose:  1 tab


Pantoprazole Sodium (Protonix -)  40 mg PO DAILY [facility]


   Last Admin: 01/07/18 10:07 Dose:  40 mg


Polyethylene Glycol (Miralax (For Daily Use) -)  17 gm PO DAILY [facility]


   Last Admin: 01/07/18 10:07 Dose:  17 grams


Scopolamine HBr (Transderm-Scop -)  1 patch TD Q3D [facility]











- Objective


Vital Signs: 


 Vital Signs











Temperature  97.4 F L  01/07/18 09:10


 


Pulse Rate  61   01/07/18 09:10


 


Respiratory Rate  20   01/07/18 09:10


 


Blood Pressure  129/74   01/07/18 09:10


 


O2 Sat by Pulse Oximetry (%)  89 L  01/07/18 09:00











Constitutional: Yes: Well Nourished, Calm


Eyes: Yes: WNL


HENT: Yes: WNL


Neck: Yes: WNL


Cardiovascular: Yes: Regular Rate and Rhythm, S1, S2


Respiratory: Yes: Rhonchi (scattered jacqueline rhonchi)


Gastrointestinal: Yes: Normal Bowel Sounds, Soft


Extremities: Yes: WNL


Edema: No


Labs: 


 CBC, BMP





 01/07/18 06:30 





 01/07/18 06:30 





 INR, PTT











INR  1.12  (0.82-1.09)   12/27/17  09:15    














- ....Imaging


Cat Scan: Report Reviewed, Image Reviewed





Assessment/Plan








Problem List


- Problems


(1) COPD (chronic obstructive pulmonary disease)


Code(s): J44.9 - CHRONIC OBSTRUCTIVE PULMONARY DISEASE, UNSPECIFIED   


Qualifiers: 


   COPD type: COPD with acute lower respiratory infection   Qualified Code(s): 

J44.0 - Chronic obstructive pulmonary disease with acute lower respiratory 

infection   





(2) Hypertension


Code(s): I10 - ESSENTIAL (PRIMARY) HYPERTENSION   





(3) Pneumonia


Code(s): J18.9 - PNEUMONIA, UNSPECIFIED ORGANISM   


Qualifiers: 


   Pneumonia type: due to unspecified organism   Laterality: left   Lung 

location: lower lobe of lung   Qualified Code(s): J18.1 - Lobar pneumonia, 

unspecified organism   





(4) Respiratory failure


Code(s): J96.90 - RESPIRATORY FAILURE, UNSP, UNSP W HYPOXIA OR HYPERCAPNIA   








Assessment/Plan


Hypoxemic respiratory failure improved


R/O PNA 


Acute COPD exacerbation improved


Bronchiectasis


Troponin leak likely demand ischemia 


HTN


Recent GIB, now with stable Hgb

















-BD TX 


-steroids


-heparin SubQ


-O2





 DR BONNER

## 2018-01-07 NOTE — PN
Teaching Attending Note


Name of Resident: .





ATTENDING PHYSICIAN STATEMENT





SUBJECTIVE:


Patient seen and examined, No complaints, just ate a bit. 





OBJECTIVE:


 Vital Signs











 Period  Temp  Pulse  Resp  BP Sys/Penaloza  Pulse Ox


 


 Last 24 Hr  97.4 F-98.4 F  61-97  18-20  122-131/66-81  91








 Intake & Output











 01/04/18 01/05/18 01/06/18 01/07/18





 23:59 23:59 23:59 23:59


 


Intake Total 330 500 300 400


 


Output Total 1300 800 400 200


 


Balance -970 -300 -100 200


 


Weight 161 lb 12.8 oz 160 lb 12.8 oz 160 lb 164 lb 12.8 oz








General: dyspneic with minimal exertion or conversation in bed


CVS:S1S2 regular


Chest: right sided coarse rales anteriorly, right basilar rales, positive air 

entry bilaterally


Abdomen: soft, NT, ND, positive bowel sounds


extremities: no edema





 Home Medication List











 Medication  Instructions  Recorded  Confirmed  Type


 


Acetaminophen [Tylenol] 2 tab PO Q6H PRN 12/27/17 12/27/17 History


 


Albuterol 2.5/Ipratropium 0.5 1 amp NEB TID 12/27/17 12/27/17 History





[Duoneb -]    


 


Budesonide [Pulmicort 0.5 mg 1 neb PO BID 12/27/17 12/27/17 History





Nebulizer -]    


 


Ipratropium 0.02% Nebulizer 1 amp NEB Q6H PRN 12/27/17 12/27/17 History





[Atrovent 0.02% Nebulizer -]    


 


Menthol/Phenol [Cepastat Lozenge -] 1 each MM QID PRN 12/27/17 12/27/17 History


 


Metoclopramide HCl 10 mg PO TID 12/27/17 12/27/17 History


 


Prednisone 10 mg PO BID 12/27/17 12/27/17 History


 


Tramadol HCl [Ultram] 2 mg PO DAILY 12/27/17 12/27/17 History








 Active Medications











Generic Name Dose Route Start Last Admin





  Trade Name Freq  PRN Reason Stop Dose Admin


 


Acetaminophen  1,000 mg  12/28/17 16:51  





  Ofirmev Injection -  IVPB   





  Q6H PRN   





  FEVER OR PAIN   


 


Albuterol Sulfate  1 amp  12/28/17 16:51  01/04/18 10:11





  Ventolin 0.083% Nebulizer Soln -  NEB   1 amp





  Q4H PRN   Administration





  SHORT OF BREATH/WHEEZING   


 


Albuterol/Ipratropium  1 amp  12/28/17 18:00  01/07/18 06:24





  Duoneb -  NEB   1 amp





  QIDR LIV   Administration


 


Amino Acids  30 ml  12/29/17 17:30  01/07/18 08:05





  Prosource No Carb Liquid Pkt  PO   30 ml





  BID@0800,1730 LIV   Administration


 


Heparin Sodium (Porcine)  5,000 unit  12/28/17 22:00  01/07/18 06:27





  Heparin -  SQ   5,000 unit





  TID LIV   Administration


 


Methylprednisolone Sodium Succinate  40 mg  01/06/18 22:00  01/07/18 10:07





  Solu-Medrol -  IVPUSH   40 mg





  BID LIV   Administration


 


Multivitamins/Minerals/Vitamin C  1 tab  12/29/17 10:00  01/07/18 10:07





  Tab-A-Vit -  PO   1 tab





  DAILY LIV   Administration


 


Pantoprazole Sodium  40 mg  12/31/17 10:00  01/07/18 10:07





  Protonix -  PO   40 mg





  DAILY LIV   Administration


 


Polyethylene Glycol  17 gm  01/05/18 13:00  01/07/18 10:07





  Miralax (For Daily Use) -  PO   17 grams





  DAILY LIV   Administration








 Laboratory Results - last 24 hr











  01/07/18 01/07/18





  06:30 06:30


 


WBC  13.8 H 


 


RBC  4.48 


 


Hgb  12.8 


 


Hct  41.6 


 


MCV  92.8 


 


MCH  28.7 


 


MCHC  30.9 L 


 


RDW  17.0 H 


 


Plt Count  331 


 


MPV  8.8 


 


Neutrophils %  91.0 H 


 


Lymphocytes %  5.6 L D 


 


Monocytes %  3.2 L 


 


Eosinophils %  0.0 


 


Basophils %  0.2 


 


Sodium   141


 


Potassium   4.2


 


Chloride   97 L


 


Carbon Dioxide   38 H


 


Anion Gap   6 L


 


BUN   21 H


 


Creatinine   0.4 L


 


Random Glucose   144 H


 


Calcium   9.4








 Microbiology





12/27/17 09:15   Blood - Peripheral Venous   Blood Culture - Final


                            NO GROWTH AFTER 5 DAYS INCUBATION


12/27/17 09:15   Blood - Peripheral Venous   Blood Culture - Final


                            NO GROWTH AFTER 5 DAYS INCUBATION


12/27/17 22:52   Urine - Urine Clean Catch   Urine Culture - Final


                            NO GROWTH OBTAINED


12/28/17 18:15   Urine For Antigen Detection   Legionella Antigen - Final


12/28/17 18:15   Urine For Antigen Detection   Streptococcus pneumoniae Antigen 

(M - Final


12/27/17 17:30   Nasopharyngeal Swab   Influenza Types A,B Antigen (JAISON) - Final


12/27/17 17:30   Nasopharyngeal Swab    - Final





CT chest reviewed, looks right sided infiltrate vs aspiration, ground glass 

opacification lung, also stool in colon, follow up official read for additional 

findings.





ASSESSMENT AND PLAN:


86 year old male with a PMHx of HTN, COPD (3L NC at night), Osteoarthritis, 

Dysphagia, PUD presented to the ER with acute hypoxic respiratory distress


- Acute hypoxic hypercarpneic respiratory distress, suspect largely from 

ongoing aspiration +/- volume overload/COPD exacerbation


-Sepsis due to PNA


-Hypophosphateia


-Hypokalemia


-HTN


-PUD





Plan: 


Oxygenation overall unchanged. 


Ongoing coarse rales, right sided, lunch tray at bedside. Overall picture 

highly suspicious for ongoing aspiration. 


WBC elevated today, ?Steroids vs aspiration. Monitor for new fevers or 

increased oxygen needs. 


Gentle diuresis prn since admission with some response. Follow up CT chest, 

additional diuresis accordingly. 


Place on scopolamine patch. 


Solumedrol 40 mg IV q12h, though COPD doesn't seem the main etiology. Nebs. 


S/p 7 days of zosyn/vancomycin. 


Supplemental oxygen to maintain spO2 >88%. pulmonary on board. Gentle diuresis 

on hold, 2D echo limited, results reviewed. Strict I/Os. 


s/p 7 days of Vanco/zosyn. 


Currently normotensive. not on medications at home. cont to monitor. 


Stress ulcer ppx. protonix po


DVTPPX


Encoureage OOB, aspiration precautions, HOB elevated, meals with assist. 


Family decided to make status DNR/DNI, no central line or pressors. patient 

dyspneic with minimal exertion or even conversation, unable to work with PT. 


Suspect ongoing aspiration. ?Clinical endpoint as not infected currently but 

not much improvement in respiratory status. 


Palliative care consult to address overall goals of care.

## 2018-01-08 LAB
ANION GAP SERPL CALC-SCNC: 9 MMOL/L (ref 8–16)
ANISOCYTOSIS BLD QL: 0
BUN SERPL-MCNC: 21 MG/DL (ref 7–18)
CALCIUM SERPL-MCNC: 9.4 MG/DL (ref 8.5–10.1)
CHLORIDE SERPL-SCNC: 100 MMOL/L (ref 98–107)
CO2 SERPL-SCNC: 31 MMOL/L (ref 21–32)
CREAT SERPL-MCNC: 0.5 MG/DL (ref 0.7–1.3)
DEPRECATED RDW RBC AUTO: 17.1 % (ref 11.9–15.9)
GLUCOSE SERPL-MCNC: 125 MG/DL (ref 74–106)
HCT VFR BLD CALC: 44.1 % (ref 35.4–49)
HGB BLD-MCNC: 13.5 GM/DL (ref 11.7–16.9)
MACROCYTES BLD QL: 0
MAGNESIUM SERPL-MCNC: 2.6 MG/DL (ref 1.8–2.4)
MCH RBC QN AUTO: 28.6 PG (ref 25.7–33.7)
MCHC RBC AUTO-ENTMCNC: 30.6 G/DL (ref 32–35.9)
MCV RBC: 93.5 FL (ref 80–96)
PLATELET # BLD AUTO: 361 K/MM3 (ref 134–434)
PLATELET BLD QL SMEAR: NORMAL
PMV BLD: 9 FL (ref 7.5–11.1)
POTASSIUM SERPLBLD-SCNC: 4.3 MMOL/L (ref 3.5–5.1)
RBC # BLD AUTO: 4.72 M/MM3 (ref 4–5.6)
SODIUM SERPL-SCNC: 140 MMOL/L (ref 136–145)
WBC # BLD AUTO: 16.7 K/MM3 (ref 4–10)

## 2018-01-08 RX ADMIN — MULTIVITAMIN TABLET SCH TAB: TABLET at 09:25

## 2018-01-08 RX ADMIN — HEPARIN SODIUM SCH UNIT: 5000 INJECTION, SOLUTION INTRAVENOUS; SUBCUTANEOUS at 21:41

## 2018-01-08 RX ADMIN — Medication SCH ML: at 07:59

## 2018-01-08 RX ADMIN — PANTOPRAZOLE SODIUM SCH MG: 40 TABLET, DELAYED RELEASE ORAL at 09:25

## 2018-01-08 RX ADMIN — CEFEPIME HYDROCHLORIDE SCH GM: 1 INJECTION, SOLUTION INTRAVENOUS at 18:23

## 2018-01-08 RX ADMIN — Medication SCH ML: at 17:41

## 2018-01-08 RX ADMIN — CEFEPIME HYDROCHLORIDE SCH GM: 1 INJECTION, SOLUTION INTRAVENOUS at 22:33

## 2018-01-08 RX ADMIN — HEPARIN SODIUM SCH UNIT: 5000 INJECTION, SOLUTION INTRAVENOUS; SUBCUTANEOUS at 05:42

## 2018-01-08 RX ADMIN — IPRATROPIUM BROMIDE AND ALBUTEROL SULFATE SCH AMP: .5; 3 SOLUTION RESPIRATORY (INHALATION) at 06:23

## 2018-01-08 RX ADMIN — IPRATROPIUM BROMIDE AND ALBUTEROL SULFATE SCH AMP: .5; 3 SOLUTION RESPIRATORY (INHALATION) at 17:11

## 2018-01-08 RX ADMIN — IPRATROPIUM BROMIDE AND ALBUTEROL SULFATE SCH AMP: .5; 3 SOLUTION RESPIRATORY (INHALATION) at 11:00

## 2018-01-08 RX ADMIN — METHYLPREDNISOLONE SODIUM SUCCINATE SCH MG: 40 INJECTION, POWDER, FOR SOLUTION INTRAMUSCULAR; INTRAVENOUS at 21:44

## 2018-01-08 RX ADMIN — METHYLPREDNISOLONE SODIUM SUCCINATE SCH MG: 40 INJECTION, POWDER, FOR SOLUTION INTRAMUSCULAR; INTRAVENOUS at 09:25

## 2018-01-08 RX ADMIN — POLYETHYLENE GLYCOL 3350 SCH GRAMS: 17 POWDER, FOR SOLUTION ORAL at 09:25

## 2018-01-08 RX ADMIN — HEPARIN SODIUM SCH UNIT: 5000 INJECTION, SOLUTION INTRAVENOUS; SUBCUTANEOUS at 13:52

## 2018-01-08 NOTE — PN
Progress Note, SLP





- Note


Progress Note: 





MBS results reviewed with staff. Pt's vocal quality is euphonic, much stronger.


Vocal wetness intermittently with honey thick 


Pt has been on puree and honey thick liquid, self feeding from spoon, cup and 

straw. He has received Miralax in water. No evidence of coughing with PO 

intake. Silent aspiration noted on thin, nectar and intermittently on honey 

during MBS.





 Selected Entries











  01/07/18 01/07/18 01/07/18





  05:40 09:10 09:44


 


Breakfast   75%


 


Lunch   


 


Supper   


 


Temperature 97.6 F 97.4 F L 














  01/07/18 01/07/18 01/07/18





  15:02 18:00 19:27


 


Breakfast   


 


Lunch 50%  


 


Supper   50%


 


Temperature 97.9 F 97.4 F L 














  01/07/18 01/08/18 01/08/18





  20:13 06:00 09:00


 


Breakfast   


 


Lunch   


 


Supper   


 


Temperature 97.4 F L 97.4 F L 98.1 F














  01/08/18





  09:57


 


Breakfast 75%


 


Lunch 


 


Supper 


 


Temperature 








 Laboratory Tests











  01/04/18 01/05/18 01/07/18





  06:00 06:00 06:30


 


WBC  9.2  11.1 H  13.8 H














  01/08/18





  07:00


 


WBC  16.7 H





Per PULMONARY: Breathing slightly improved from yesterday but still significant 

dyspnea with minimal exertion. +nonproductive cough and wheezing. No fevers or 

chills.


Gen:  tachypneic with speaking


Heart: RRR


Lung: bilateral expiratory rhonchi








IMP:Suspect aspiration


REC: d/c miralax/other medication better tolerated? It can NOT be mixed in 

thick liquid as it becomes thin due to chemical interaction.


Honey thick liquid on teaspoon only. Assist with meals. Pt needs reminders to 

SWALLOW HARD TWICE per tsp.





If persistent congestion, elevated wbc, consider holding po liquids, may need 

peg for hydration.

## 2018-01-08 NOTE — PN
Progress Note (short form)





- Note


Progress Note: 





PULMONARY





Breathing slightly improved from yesterday but still significant dyspnea with 

minimal exertion. +nonproductive cough and wheezing. No fevers or chills.





 Last Vital Signs











Temp Pulse Resp BP Pulse Ox


 


 98.1 F   92 H  20   123/71   92 L


 


 01/08/18 09:00  01/08/18 09:00  01/08/18 09:00  01/08/18 09:00  01/08/18 09:00








 Intake & Output











 01/05/18 01/06/18 01/07/18 01/08/18





 23:59 23:59 23:59 23:59


 


Intake Total 500 300 500 120


 


Output Total 800 400 600 


 


Balance -300 -100 -100 120


 


Weight 72.938 kg 72.575 kg 74.752 kg 73.028 kg








Gen:  tachypneic with speaking


Heart: RRR


Lung: bilateral expiratory rhonchi


Abd: soft, nontender


Ext: no edema





 CBC, BMP





 01/08/18 07:00 





 01/08/18 07:00 





Active Medications





Acetaminophen (Ofirmev Injection -)  1,000 mg IVPB Q6H PRN


   PRN Reason: FEVER OR PAIN


Albuterol Sulfate (Ventolin 0.083% Nebulizer Soln -)  1 amp NEB Q4H PRN


   PRN Reason: SHORT OF BREATH/WHEEZING


   Last Admin: 01/04/18 10:11 Dose:  1 amp


Albuterol/Ipratropium (Duoneb -)  1 amp NEB QIDR [facility]


   Last Admin: 01/08/18 06:23 Dose:  1 amp


Amino Acids (Prosource No Carb Liquid Pkt)  30 ml PO BID@0800,1730 [facility]


   Last Admin: 01/08/18 07:59 Dose:  30 ml


Heparin Sodium (Porcine) (Heparin -)  5,000 unit SQ TID [facility]


   Last Admin: 01/08/18 05:42 Dose:  5,000 unit


Methylprednisolone Sodium Succinate (Solu-Medrol -)  40 mg IVPUSH BID [facility]


   Last Admin: 01/08/18 09:25 Dose:  40 mg


Multivitamins/Minerals/Vitamin C (Tab-A-Vit -)  1 tab PO DAILY [facility]


   Last Admin: 01/08/18 09:25 Dose:  1 tab


Pantoprazole Sodium (Protonix -)  40 mg PO DAILY [facility]


   Last Admin: 01/08/18 09:25 Dose:  40 mg


Polyethylene Glycol (Miralax (For Daily Use) -)  17 gm PO DAILY [facility]


   Last Admin: 01/08/18 09:25 Dose:  17 grams


Scopolamine HBr (Transderm-Scop -)  1 patch TD Q3D [facility]


   Last Admin: 01/07/18 13:58 Dose:  1 patch





A/P


Acute COPD/Bronchiectasis Exacerbation


Acute on Chronic Hypoxic Respiratory Failure


r/o Pneumonia


Emphysema


+Troponins likely Demand Ischemia


HTN





-  completed antibiotics


-  continue medrol at current dose


-  inhaled bronchodilators standing and PRN


-  O2 to keep SpO2 >90%


-  OOB to chair if possible


-  rehab/physical therapy


-  DVT prophylaxis

## 2018-01-08 NOTE — PN
Progress Note (short form)





- Note


Progress Note: 


alert


moist cough unchanged











 


 


 Vital Signs











 Period  Temp  Pulse  Resp  BP Sys/Penaloza  Pulse Ox


 


 Last 24 Hr  97.4 F-98.1 F  77-95  18-20  122-134/62-90  90-92








edentulous


cor-rrr


lungs bilateral rhonchi


abd soft,nt


ext no edema





 CBC, BMP





 01/08/18 07:00 





 01/08/18 07:00 








a/p


aspiration pneumonia- ct scan with RLL infiltrate


rising WBC- ?infection, ?steroids


start cefepime, repeat cxray


modify diet and meds


palliative care





COPD


?CHF-improved with lasix














Problem List





- Problems


(1) Pneumonia


Code(s): J18.9 - PNEUMONIA, UNSPECIFIED ORGANISM   


Qualifiers: 


   Pneumonia type: due to unspecified organism   Laterality: left   Lung 

location: lower lobe of lung   Qualified Code(s): J18.1 - Lobar pneumonia, 

unspecified organism   





(2) Respiratory failure


Code(s): J96.90 - RESPIRATORY FAILURE, UNSP, UNSP W HYPOXIA OR HYPERCAPNIA   





(3) COPD (chronic obstructive pulmonary disease)


Code(s): J44.9 - CHRONIC OBSTRUCTIVE PULMONARY DISEASE, UNSPECIFIED   


Qualifiers: 


   COPD type: COPD with acute lower respiratory infection   Qualified Code(s): 

J44.0 - Chronic obstructive pulmonary disease with acute lower respiratory 

infection

## 2018-01-08 NOTE — PN
Teaching Attending Note


Name of Resident: Earl Davila





ATTENDING PHYSICIAN STATEMENT


time of evaluation: 11:25 AM


I saw and evaluated the patient.


I reviewed the resident's note and discussed the case with the resident.


I agree with the resident's findings and plan as documented.








SUBJECTIVE:


Patient seen and examined. Denies any complaints or pain.  12 point ROS limited 

by dementia.          





OBJECTIVE:


 Vital Signs











 Period  Temp  Pulse  Resp  BP Sys/Penaloza  Pulse Ox


 


 Last 24 Hr  97.4 F-98.1 F  77-95  18-20  122-134/62-90  90-92








 Intake & Output











 01/05/18 01/06/18 01/07/18 01/08/18





 23:59 23:59 23:59 23:59


 


Intake Total 500 300 500 120


 


Output Total 800 400 600 


 


Balance -300 -100 -100 120


 


Weight 160 lb 12.8 oz 160 lb 164 lb 12.8 oz 161 lb








General: lying in bed, dyspneic with minimal exertion or conversation in bed


CVs;S1S2 regular


Chest: bilateral rhoncherous breath sounds


abdomen: soft, NT


extremities; no edema





 Home Medication List











 Medication  Instructions  Recorded  Confirmed  Type


 


Acetaminophen [Tylenol] 2 tab PO Q6H PRN 12/27/17 12/27/17 History


 


Albuterol 2.5/Ipratropium 0.5 1 amp NEB TID 12/27/17 12/27/17 History





[Duoneb -]    


 


Budesonide [Pulmicort 0.5 mg 1 neb PO BID 12/27/17 12/27/17 History





Nebulizer -]    


 


Ipratropium 0.02% Nebulizer 1 amp NEB Q6H PRN 12/27/17 12/27/17 History





[Atrovent 0.02% Nebulizer -]    


 


Menthol/Phenol [Cepastat Lozenge -] 1 each MM QID PRN 12/27/17 12/27/17 History


 


Metoclopramide HCl 10 mg PO TID 12/27/17 12/27/17 History


 


Prednisone 10 mg PO BID 12/27/17 12/27/17 History


 


Tramadol HCl [Ultram] 2 mg PO DAILY 12/27/17 12/27/17 History








 Active Medications











Generic Name Dose Route Start Last Admin





  Trade Name Freq  PRN Reason Stop Dose Admin


 


Acetaminophen  1,000 mg  12/28/17 16:51  





  Ofirmev Injection -  IVPB   





  Q6H PRN   





  FEVER OR PAIN   


 


Albuterol Sulfate  1 amp  12/28/17 16:51  01/04/18 10:11





  Ventolin 0.083% Nebulizer Soln -  NEB   1 amp





  Q4H PRN   Administration





  SHORT OF BREATH/WHEEZING   


 


Albuterol/Ipratropium  1 amp  12/28/17 18:00  01/08/18 11:00





  Duoneb -  NEB   1 amp





  QIDR LIV   Administration


 


Amino Acids  30 ml  12/29/17 17:30  01/08/18 07:59





  Prosource No Carb Liquid Pkt  PO   30 ml





  BID@0800,1730 LIV   Administration


 


Heparin Sodium (Porcine)  5,000 unit  12/28/17 22:00  01/08/18 13:52





  Heparin -  SQ   5,000 unit





  TID LIV   Administration


 


Methylprednisolone Sodium Succinate  40 mg  01/06/18 22:00  01/08/18 09:25





  Solu-Medrol -  IVPUSH   40 mg





  BID LIV   Administration


 


Multivitamins/Minerals/Vitamin C  1 tab  12/29/17 10:00  01/08/18 09:25





  Tab-A-Vit -  PO   1 tab





  DAILY LIV   Administration


 


Pantoprazole Sodium  40 mg  12/31/17 10:00  01/08/18 09:25





  Protonix -  PO   40 mg





  DAILY LIV   Administration


 


Polyethylene Glycol  17 gm  01/05/18 13:00  01/08/18 09:25





  Miralax (For Daily Use) -  PO   17 grams





  DAILY LIV   Administration


 


Scopolamine HBr  1 patch  01/07/18 12:30  01/07/18 13:58





  Transderm-Scop -  TD   1 patch





  Q3D LIV   Administration








 Laboratory Results - last 24 hr











  01/08/18 01/08/18





  07:00 07:00


 


WBC  16.7 H 


 


RBC  4.72 


 


Hgb  13.5 


 


Hct  44.1 


 


MCV  93.5 


 


MCH  28.6 


 


MCHC  30.6 L 


 


RDW  17.1 H 


 


Plt Count  361 


 


MPV  9.0 


 


Neutrophils %  No Result Required. 


 


Neutrophils % (Manual)  88.9 H D 


 


Band Neutrophils %  2.0 


 


Lymphocytes %  No Result Required. 


 


Lymphocytes % (Manual)  2.0 L D 


 


Monocytes % (Manual)  7 


 


Eosinophils % (Manual)  0.0 


 


Basophils % (Manual)  0.0 


 


Myelocytes % (Man)  0 


 


Metamyelocytes  0 


 


Hypochromia  0 


 


Platelet Estimate  Normal 


 


Polychromasia  0 


 


Poikilocytosis  0 


 


Anisocytosis  0 


 


Microcytosis  0 


 


Macrocytosis  0 


 


Sodium   140


 


Potassium   4.3


 


Chloride   100


 


Carbon Dioxide   31


 


Anion Gap   9


 


BUN   21 H


 


Creatinine   0.5 L D


 


Random Glucose   125 H


 


Calcium   9.4


 


Magnesium   2.6 H








 Microbiology





12/27/17 09:15   Blood - Peripheral Venous   Blood Culture - Final


                            NO GROWTH AFTER 5 DAYS INCUBATION


12/27/17 09:15   Blood - Peripheral Venous   Blood Culture - Final


                            NO GROWTH AFTER 5 DAYS INCUBATION


12/27/17 22:52   Urine - Urine Clean Catch   Urine Culture - Final


                            NO GROWTH OBTAINED


12/28/17 18:15   Urine For Antigen Detection   Legionella Antigen - Final


12/28/17 18:15   Urine For Antigen Detection   Streptococcus pneumoniae Antigen 

(M - Final


12/27/17 17:30   Nasopharyngeal Swab   Influenza Types A,B Antigen (JAISON) - Final


12/27/17 17:30   Nasopharyngeal Swab    - Final





CT chest results reviewed





ASSESSMENT AND PLAN:


86 year old male with a PMHx of HTN, COPD (3L NC at night), Osteoarthritis, 

Dysphagia, PUD presented to the ER with acute hypoxic respiratory distress


- Acute hypoxic hypercarpneic respiratory distress, suspect largely from 

ongoing RLL aspiration PNA +/- volume overload/COPD exacerbation


-Sepsis due to PNA


-Hypophosphateia


-Hypokalemia


-HTN


-PUD





Plan: 


Oxygenation overall unchanged. 


Ongoing coarse rales. Overall picture highly suspicious for ongoing aspiration. 


Rising WBC, ?Steroids vs aspiration


Gentle diuresis prn since admission with some response. 2D echo reviewed. 

strict I/Os.


CT chest noted


Scopolamine patch. 


Solumedrol 40 mg IV q12h. Nebs. 


S/p 7 days of zosyn/vancomycin. Discussed with Dr. Marques for input, will 

follow up. 


Supplemental oxygen to maintain spO2 >88%. pulmonary on board. 


Currently normotensive. not on medications at home. cont to monitor. 


Stress ulcer ppx. protonix po


DVTPPX


Encourage OOB, aspiration precautions, HOB elevated, meals with assist. 


Family decided to make status DNR/DNI, no central line or pressors. patient 

dyspneic with minimal exertion or even conversation, unable to work with PT. 


Suspect ongoing aspiration. Rising WBC  and overall unchanged tenuous 

respiratory status. 


 ?Clinical endpoint 


Palliative care consult to address overall goals of care and possible hospice 

options, Discussed with Elle Palliative care RN.

## 2018-01-08 NOTE — PN
Physical Exam: 


SUBJECTIVE: Patient seen and examined no events overnight. No new complaints 

today.








OBJECTIVE:





 Vital Signs











 Period  Temp  Pulse  Resp  BP Sys/Penaloza  Pulse Ox


 


 Last 24 Hr  97.4 F-98.1 F  77-95  18-20  122-134/68-90  90-92











GENERAL: NAD, awake, alert, laying in bed, thin-appearing.


HEENT: No JVD, sclera anicteric, EOMI, moist mucosa


LUNGS: Coarse breath sounds anteriorly No wheezes. no rales. No accessory 

muscle use. Currently on 4LNC.  


HEART: RRR, S1, S2 with 3/6 systolic murmur at LLSB auscultated


ABDOMEN: Soft, nontender, nondistended, normoactive bowel sounds, no guarding, 

no hepatomegaly


EXTREMITIES: 2+ DP pulses, warm, well-perfused, no edema b/l


NEUROLOGICAL: Normal speech, gait not observed.


SKIN: Warm, no rashes noted














 Laboratory Results - last 24 hr











  01/08/18 01/08/18





  07:00 07:00


 


WBC  16.7 H 


 


RBC  4.72 


 


Hgb  13.5 


 


Hct  44.1 


 


MCV  93.5 


 


MCH  28.6 


 


MCHC  30.6 L 


 


RDW  17.1 H 


 


Plt Count  361 


 


MPV  9.0 


 


Neutrophils %  No Result Required. 


 


Neutrophils % (Manual)  88.9 H D 


 


Band Neutrophils %  2.0 


 


Lymphocytes %  No Result Required. 


 


Lymphocytes % (Manual)  2.0 L D 


 


Monocytes % (Manual)  7 


 


Eosinophils % (Manual)  0.0 


 


Basophils % (Manual)  0.0 


 


Myelocytes % (Man)  0 


 


Metamyelocytes  0 


 


Hypochromia  0 


 


Platelet Estimate  Normal 


 


Polychromasia  0 


 


Poikilocytosis  0 


 


Anisocytosis  0 


 


Microcytosis  0 


 


Macrocytosis  0 


 


Sodium   140


 


Potassium   4.3


 


Chloride   100


 


Carbon Dioxide   31


 


Anion Gap   9


 


BUN   21 H


 


Creatinine   0.5 L D


 


Random Glucose   125 H


 


Calcium   9.4


 


Magnesium   2.6 H








Active Medications











Generic Name Dose Route Start Last Admin





  Trade Name Freq  PRN Reason Stop Dose Admin


 


Acetaminophen  1,000 mg  12/28/17 16:51  





  Ofirmev Injection -  IVPB   





  Q6H PRN   





  FEVER OR PAIN   


 


Albuterol Sulfate  1 amp  12/28/17 16:51  01/04/18 10:11





  Ventolin 0.083% Nebulizer Soln -  NEB   1 amp





  Q4H PRN   Administration





  SHORT OF BREATH/WHEEZING   


 


Albuterol/Ipratropium  1 amp  12/28/17 18:00  01/08/18 11:00





  Duoneb -  NEB   1 amp





  QIDR GABY   Administration


 


Amino Acids  30 ml  12/29/17 17:30  01/08/18 07:59





  Prosource No Carb Liquid Pkt  PO   30 ml





  BID@0800,1730 GABY   Administration


 


Cefepime HCl  1 gm  01/08/18 16:15  





  Maxipime 1 Gm Premix Ivpb  IVPB   





  BID GABY   


 


Heparin Sodium (Porcine)  5,000 unit  12/28/17 22:00  01/08/18 13:52





  Heparin -  SQ   5,000 unit





  TID GABY   Administration


 


Methylprednisolone Sodium Succinate  40 mg  01/06/18 22:00  01/08/18 09:25





  Solu-Medrol -  IVPUSH   40 mg





  BID GABY   Administration


 


Multivitamins/Minerals/Vitamin C  1 tab  12/29/17 10:00  01/08/18 09:25





  Tab-A-Vit -  PO   1 tab





  DAILY GABY   Administration


 


Pantoprazole Sodium  40 mg  12/31/17 10:00  01/08/18 09:25





  Protonix -  PO   40 mg





  DAILY GABY   Administration


 


Scopolamine HBr  1 patch  01/07/18 12:30  01/07/18 13:58





  Transderm-Scop -  TD   1 patch





  Q3D GABY   Administration











ASSESSMENT/PLAN:


85yo M with h/o COPD (baseline 3LNC at night), HTN, OA who was found to acute 

hypoxic hypercapnic distress. 





1) Acute hypoxic hypercapnic respiratory distress


   --2/2 to PNA most likely with component of COPD


   --Continue Medrol 40mg IVP BID


   --Maintain SpO2 >88-90%


   --Pulmonology on board


   --Duoneb QID gaby


   --Nebs PRN q4h





2) Sepsis 2/2 to PNA


   --s/p 7 days regiment of zosyn


   --Suspected on-going aspiration due to no improvement in breath sounds


   --Afebrile


   --Leukocytosis 16.7, most likely steroids however will monitor





3) HTN


   --Currently controlled


   --Continue BP monitoring





FEN:


   Fluids: None; suspected hypervolemia


   Electrolyte abnormalities: None


   Nutrition: Dysphagia puree with honey-thick liquids





PPX:


   DVT - Heparin SQ TID





Code Status: DNR/DNI


Dispo: Palliative care consult to address overall goals of care and possible 

hospice options


Case discussed with Dr. Dale Davila, DO - Internal Medicine PGY-1





Visit type





- Emergency Visit


Emergency Visit: No





- New Patient


This patient is new to me today: No





- Critical Care


Critical Care patient: No

## 2018-01-09 LAB
DEPRECATED RDW RBC AUTO: 17.9 % (ref 11.9–15.9)
HCT VFR BLD CALC: 41.3 % (ref 35.4–49)
HGB BLD-MCNC: 12.5 GM/DL (ref 11.7–16.9)
MCH RBC QN AUTO: 28.4 PG (ref 25.7–33.7)
MCHC RBC AUTO-ENTMCNC: 30.2 G/DL (ref 32–35.9)
MCV RBC: 93.9 FL (ref 80–96)
PLATELET # BLD AUTO: 179 K/MM3 (ref 134–434)
PMV BLD: 8.6 FL (ref 7.5–11.1)
RBC # BLD AUTO: 4.4 M/MM3 (ref 4–5.6)
WBC # BLD AUTO: 15.7 K/MM3 (ref 4–10)

## 2018-01-09 RX ADMIN — HEPARIN SODIUM SCH UNIT: 5000 INJECTION, SOLUTION INTRAVENOUS; SUBCUTANEOUS at 14:25

## 2018-01-09 RX ADMIN — IPRATROPIUM BROMIDE AND ALBUTEROL SULFATE SCH AMP: .5; 3 SOLUTION RESPIRATORY (INHALATION) at 16:00

## 2018-01-09 RX ADMIN — HEPARIN SODIUM SCH UNIT: 5000 INJECTION, SOLUTION INTRAVENOUS; SUBCUTANEOUS at 21:44

## 2018-01-09 RX ADMIN — CEFEPIME HYDROCHLORIDE SCH GM: 1 INJECTION, SOLUTION INTRAVENOUS at 22:35

## 2018-01-09 RX ADMIN — IPRATROPIUM BROMIDE AND ALBUTEROL SULFATE SCH AMP: .5; 3 SOLUTION RESPIRATORY (INHALATION) at 12:01

## 2018-01-09 RX ADMIN — Medication SCH ML: at 07:56

## 2018-01-09 RX ADMIN — CEFEPIME HYDROCHLORIDE SCH: 1 INJECTION, SOLUTION INTRAVENOUS at 22:34

## 2018-01-09 RX ADMIN — HEPARIN SODIUM SCH UNIT: 5000 INJECTION, SOLUTION INTRAVENOUS; SUBCUTANEOUS at 06:16

## 2018-01-09 RX ADMIN — METHYLPREDNISOLONE SODIUM SUCCINATE SCH MG: 40 INJECTION, POWDER, FOR SOLUTION INTRAMUSCULAR; INTRAVENOUS at 09:28

## 2018-01-09 RX ADMIN — CEFEPIME HYDROCHLORIDE SCH GM: 1 INJECTION, SOLUTION INTRAVENOUS at 09:31

## 2018-01-09 RX ADMIN — IPRATROPIUM BROMIDE AND ALBUTEROL SULFATE SCH AMP: .5; 3 SOLUTION RESPIRATORY (INHALATION) at 20:20

## 2018-01-09 RX ADMIN — Medication SCH ML: at 17:15

## 2018-01-09 RX ADMIN — MULTIVITAMIN TABLET SCH TAB: TABLET at 09:28

## 2018-01-09 RX ADMIN — IPRATROPIUM BROMIDE AND ALBUTEROL SULFATE SCH AMP: .5; 3 SOLUTION RESPIRATORY (INHALATION) at 06:15

## 2018-01-09 RX ADMIN — IPRATROPIUM BROMIDE AND ALBUTEROL SULFATE SCH AMP: .5; 3 SOLUTION RESPIRATORY (INHALATION) at 00:00

## 2018-01-09 RX ADMIN — PANTOPRAZOLE SODIUM SCH MG: 40 TABLET, DELAYED RELEASE ORAL at 09:29

## 2018-01-09 NOTE — PN
Teaching Attending Note


Name of Resident: Earl Davila





ATTENDING PHYSICIAN STATEMENT





I saw and evaluated the patient.


I reviewed the resident's note and discussed the case with the resident.


I agree with the resident's findings and plan as documented.








SUBJECTIVE:no complaints. states he coughs intermittently but not related to 

food. denies Cp, SOB, fever, chills, N/V/C/D








OBJECTIVE:


 Last Vital Signs











Temp Pulse Resp BP Pulse Ox


 


 97.6 F   70   20   136/69   94 L


 


 01/09/18 09:00  01/09/18 10:17  01/09/18 09:00  01/09/18 09:00  01/09/18 10:17











General NAD


Lungs coarse breath sounds no crackles or wheezing


Extremities no pedal edema





ASSESSMENT AND PLAN:


86 year old male with a PMHx of HTN, COPD (3L NC at night), Osteoarthritis, 

Dysphagia, PUD presented to the ER with acute hypoxic respiratory distress


1. Acute hypoxic hypercapnic respiratory distress- due to possible PNA vs acute 

on chronic COPD. appears to be aspiration at this time as started to worsen. CT 

chest showing RLL infiltrate. plan for MBS today. may need to consider PEG. on 

Cefipime. will decrease medrol to 40mg daily from BID. saturating 94% on 3L NC. 

Supplemental oxygen to maintain spO2 >88%. pulmonary on board


2. Sepsis due to aspiration PNA- afebrile. leukocytosis trending down. Repeat 

MBS today. on cefipime day 2. after completing course of vanco/zosyn. Flu and 

legonella negative. ID on board


3. Hypophosphatemia- resolved


4. hypokalemia-resolved


4. HTN- currently normotensive. not on medications at home. cont to monitor. 


5. PUD- no sings of bleeding. stress ulcer ppx. protonix po


6. DVT ppx- hep sq


7. Poor prognosis. palliative care on board. awaiting family decision on GOC. 

DNR/DNI, no central line or pressors.

## 2018-01-09 NOTE — PN
Progress Note (short form)





- Note


Progress Note: 





PULMONARY





States breathing slightly improving but still significant dyspnea with minimal 

exertion. +nonproductive cough and wheezing. No fevers or chills.





 Last Vital Signs











Temp Pulse Resp BP Pulse Ox


 


 97.3 F L  94 H  20   139/87   94 L


 


 01/09/18 14:51  01/09/18 14:51  01/09/18 14:51  01/09/18 14:51  01/09/18 10:17











Gen:  less tachypneic with speaking


Heart: RRR


Lung: bilateral expiratory rhonchi


Abd: soft, nontender


Ext: no edema


 CBC, BMP





 01/09/18 06:00 





 01/08/18 07:00 





Active Medications





Acetaminophen (Ofirmev Injection -)  1,000 mg IVPB Q6H PRN


   PRN Reason: FEVER OR PAIN


Albuterol Sulfate (Ventolin 0.083% Nebulizer Soln -)  1 amp NEB Q4H PRN


   PRN Reason: SHORT OF BREATH/WHEEZING


   Last Admin: 01/04/18 10:11 Dose:  1 amp


Albuterol/Ipratropium (Duoneb -)  1 amp NEB RQID [facility]


   Last Admin: 01/09/18 12:01 Dose:  1 amp


Amino Acids (Prosource No Carb Liquid Pkt)  30 ml PO BID@0800,1730 [facility]


   Last Admin: 01/09/18 07:56 Dose:  30 ml


Cefepime HCl (Maxipime 1 Gm Premix Ivpb)  1 gm IVPB BID [facility]


   Last Admin: 01/09/18 09:31 Dose:  1 gm


Heparin Sodium (Porcine) (Heparin -)  5,000 unit SQ TID [facility]


   Last Admin: 01/09/18 14:25 Dose:  5,000 unit


Methylprednisolone Sodium Succinate (Solu-Medrol -)  40 mg IVPUSH DAILY [facility]


Multivitamins/Minerals/Vitamin C (Tab-A-Vit -)  1 tab PO DAILY [facility]


   Last Admin: 01/09/18 09:28 Dose:  1 tab


Pantoprazole Sodium (Protonix -)  40 mg PO DAILY [facility]


   Last Admin: 01/09/18 09:29 Dose:  40 mg


Scopolamine HBr (Transderm-Scop -)  1 patch TD Q3D [facility]


   Last Admin: 01/07/18 13:58 Dose:  1 patch








A/P


Acute COPD/Bronchiectasis Exacerbation


Acute on Chronic Hypoxic Respiratory Failure


r/o Pneumonia


Emphysema


+Troponins likely Demand Ischemia


HTN





-  antibiotics per ID


-  slow medrol taper


-  inhaled bronchodilators standing and PRN


-  O2 to keep SpO2 >90%


-  OOB to chair if possible


-  rehab/physical therapy


-  DVT prophylaxis

## 2018-01-09 NOTE — PN
Physical Exam: 


SUBJECTIVE: Patient seen and examined earlier in morning. no acute events and 

no new complaints








OBJECTIVE:





 Vital Signs











 Period  Temp  Pulse  Resp  BP Sys/Penaloza  Pulse Ox


 


 Last 24 Hr  97.3 F-98.4 F  70-94  20-20  115-139/69-87  94-94











GENERAL: NAD, awake, alert, laying in bed, thin-appearing.


HEENT: No JVD, sclera anicteric, EOMI, moist mucosa


LUNGS: Coarse breath sounds anteriorly No wheezes. no rales. No accessory 

muscle use. Currently on 4LNC.  


HEART: RRR, S1, S2 with 3/6 systolic murmur at LLSB auscultated


ABDOMEN: Soft, nontender, nondistended, normoactive bowel sounds, no guarding, 

no hepatomegaly


EXTREMITIES: 2+ DP pulses, warm, well-perfused, no edema b/l


NEUROLOGICAL: Normal speech, gait not observed.


SKIN: Warm, no rashes noted














 Laboratory Results - last 24 hr











  01/09/18





  06:00


 


WBC  15.7 H


 


RBC  4.40


 


Hgb  12.5


 


Hct  41.3


 


MCV  93.9


 


MCH  28.4


 


MCHC  30.2 L


 


RDW  17.9 H


 


Plt Count  179  D


 


MPV  8.6








Active Medications











Generic Name Dose Route Start Last Admin





  Trade Name Freq  PRN Reason Stop Dose Admin


 


Acetaminophen  1,000 mg  12/28/17 16:51  





  Ofirmev Injection -  IVPB   





  Q6H PRN   





  FEVER OR PAIN   


 


Albuterol Sulfate  1 amp  12/28/17 16:51  01/04/18 10:11





  Ventolin 0.083% Nebulizer Soln -  NEB   1 amp





  Q4H PRN   Administration





  SHORT OF BREATH/WHEEZING   


 


Albuterol/Ipratropium  1 amp  01/09/18 10:22  01/09/18 12:01





  Duoneb -  NEB   1 amp





  RQID GABY   Administration


 


Amino Acids  30 ml  12/29/17 17:30  01/09/18 17:15





  Prosource No Carb Liquid Pkt  PO   30 ml





  BID@0800,1730 GABY   Administration


 


Cefepime HCl  1 gm  01/08/18 16:15  01/09/18 09:31





  Maxipime 1 Gm Premix Ivpb  IVPB   1 gm





  BID GABY   Administration


 


Heparin Sodium (Porcine)  5,000 unit  12/28/17 22:00  01/09/18 14:25





  Heparin -  SQ   5,000 unit





  TID GABY   Administration


 


Methylprednisolone Sodium Succinate  40 mg  01/10/18 10:00  





  Solu-Medrol -  IVPUSH   





  DAILY GBAY   


 


Multivitamins/Minerals/Vitamin C  1 tab  12/29/17 10:00  01/09/18 09:28





  Tab-A-Vit -  PO   1 tab





  DAILY GABY   Administration


 


Pantoprazole Sodium  40 mg  12/31/17 10:00  01/09/18 09:29





  Protonix -  PO   40 mg





  DAILY GABY   Administration


 


Scopolamine HBr  1 patch  01/07/18 12:30  01/07/18 13:58





  Transderm-Scop -  TD   1 patch





  Q3D GABY   Administration











ASSESSMENT/PLAN:


87yo M with h/o COPD (baseline 3LNC at night), HTN, OA who was found to acute 

hypoxic hypercapnic distress. 





1) Acute hypoxic hypercapnic respiratory distress


   --2/2 to PNA most likely with component of COPD


   --Continue Medrol 40mg IVP qDaily


   --Maintain SpO2 >88-90%


   --Pulmonology on board


   --Duoneb QID gaby


   --Nebs PRN q4h





2) Sepsis 2/2 to PNA


   --Cefepime day 2


   --Suspected on-going aspiration due to no improvement in breath sounds


      --Repeat barium swallow with suggestion of PEG; however will discuss GOC 

tomorrow with HCP in meeting with palliative tomorrow


   --Afebrile


   --Leukocytosis 16.7, most likely steroids however will monitor





3) HTN


   --Currently controlled


   --Continue BP monitoring





FEN:


   Fluids: None; suspected hypervolemia


   Electrolyte abnormalities: None


   Nutrition: Dysphagia puree with honey-thick liquids





PPX:


   DVT - Heparin SQ TID





Code Status: DNR/DNI, no pressors, no central lines


Dispo: HCP, palliative, and medical team meeting tomorrow to discuss goals of 

care


Case discussed with Dr. Johnny Davila, DO - Internal Medicine PGY-1





Visit type





- Emergency Visit


Emergency Visit: No





- New Patient


This patient is new to me today: No





- Critical Care


Critical Care patient: No

## 2018-01-09 NOTE — PN
Progress Note, SLP





- Note


Progress Note: 





MBS results reviewed with staff. Pt's vocal quality is euphonic, much stronger.


Vocal wetness intermittently with honey thick 


Pt has been on puree and honey thick liquid, self feeding from spoon, cup and 

straw. He has received Miralax in water. No evidence of coughing with PO 

intake. Silent aspiration noted on thin, nectar and intermittently on honey 

during MBS.





Pt is feeding himself. IV fluids on hold, according to nursing. Miralax d/c'd.


 





IMP:Suspect aspiration





REC: Please adjust diet order which states puree, honey thick liquid on tsp, no 

liquids.





Appreciate Palliative care consult. F/u regarding pt's end of life wishes for 

nutrition, especially for hydration.





Honey thick liquid on teaspoon only. Assist with meals. Pt needs reminders to 

SWALLOW HARD TWICE per tsp. Reviewed with CNA/RN.





Monitor nutrition/hydration/pulmonary status.





If persistent congestion, consider holding po liquids, may benefit from peg for 

hydration.

## 2018-01-09 NOTE — PN
Progress Note (short form)





- Note


Progress Note: 


alert


moist cough unchanged


he is feeding himself!











 


 


 Vital Signs











 Period  Temp  Pulse  Resp  BP Sys/Penaloza  Pulse Ox


 


 Last 24 Hr  97.4 F-98.4 F  70-92  20-20  115-136/68-81  90-94








cor-rrr


lungs scattered rhonchi


abd soft,nt


ext no edema





 CBC, BMP





 01/09/18 06:00 





 01/08/18 07:00 





 Microbiology





12/27/17 09:15   Blood - Peripheral Venous   Blood Culture - Final


                            NO GROWTH AFTER 5 DAYS INCUBATION


12/27/17 09:15   Blood - Peripheral Venous   Blood Culture - Final


                            NO GROWTH AFTER 5 DAYS INCUBATION


12/27/17 22:52   Urine - Urine Clean Catch   Urine Culture - Final


                            NO GROWTH OBTAINED


12/28/17 18:15   Urine For Antigen Detection   Legionella Antigen - Final


12/28/17 18:15   Urine For Antigen Detection   Streptococcus pneumoniae Antigen 

(M - Final


12/27/17 17:30   Nasopharyngeal Swab   Influenza Types A,B Antigen (JAISON) - Final


12/27/17 17:30   Nasopharyngeal Swab    - Final








a/p


aspiration pneumonia- ct scan with RLL infiltrate


rising WBC- ?infection, ?steroids


cefepime day #2 repeat cxray is unchanged


modify diet and meds


palliative care








COPD


?CHF-improved with lasix














Problem List





- Problems


(1) Pneumonia


Code(s): J18.9 - PNEUMONIA, UNSPECIFIED ORGANISM   


Qualifiers: 


   Pneumonia type: due to unspecified organism   Laterality: left   Lung 

location: lower lobe of lung   Qualified Code(s): J18.1 - Lobar pneumonia, 

unspecified organism   





(2) Respiratory failure


Code(s): J96.90 - RESPIRATORY FAILURE, UNSP, UNSP W HYPOXIA OR HYPERCAPNIA   





(3) COPD (chronic obstructive pulmonary disease)


Code(s): J44.9 - CHRONIC OBSTRUCTIVE PULMONARY DISEASE, UNSPECIFIED   


Qualifiers: 


   COPD type: COPD with acute lower respiratory infection   Qualified Code(s): 

J44.0 - Chronic obstructive pulmonary disease with acute lower respiratory 

infection

## 2018-01-10 RX ADMIN — SCOPALAMINE SCH PATCH: 1 PATCH, EXTENDED RELEASE TRANSDERMAL at 13:09

## 2018-01-10 RX ADMIN — IPRATROPIUM BROMIDE AND ALBUTEROL SULFATE SCH AMP: .5; 3 SOLUTION RESPIRATORY (INHALATION) at 11:41

## 2018-01-10 RX ADMIN — HEPARIN SODIUM SCH UNIT: 5000 INJECTION, SOLUTION INTRAVENOUS; SUBCUTANEOUS at 14:47

## 2018-01-10 RX ADMIN — CEFEPIME HYDROCHLORIDE SCH MLS/HR: 1 INJECTION, SOLUTION INTRAVENOUS at 22:14

## 2018-01-10 RX ADMIN — CEFEPIME HYDROCHLORIDE SCH: 1 INJECTION, SOLUTION INTRAVENOUS at 11:29

## 2018-01-10 RX ADMIN — CEFEPIME HYDROCHLORIDE SCH MLS/HR: 1 INJECTION, SOLUTION INTRAVENOUS at 11:29

## 2018-01-10 RX ADMIN — Medication SCH ML: at 08:25

## 2018-01-10 RX ADMIN — PANTOPRAZOLE SODIUM SCH MG: 40 TABLET, DELAYED RELEASE ORAL at 09:58

## 2018-01-10 RX ADMIN — IPRATROPIUM BROMIDE AND ALBUTEROL SULFATE SCH AMP: .5; 3 SOLUTION RESPIRATORY (INHALATION) at 17:00

## 2018-01-10 RX ADMIN — Medication SCH ML: at 17:19

## 2018-01-10 RX ADMIN — IPRATROPIUM BROMIDE AND ALBUTEROL SULFATE SCH AMP: .5; 3 SOLUTION RESPIRATORY (INHALATION) at 08:54

## 2018-01-10 RX ADMIN — IPRATROPIUM BROMIDE AND ALBUTEROL SULFATE SCH AMP: .5; 3 SOLUTION RESPIRATORY (INHALATION) at 20:30

## 2018-01-10 RX ADMIN — HEPARIN SODIUM SCH UNIT: 5000 INJECTION, SOLUTION INTRAVENOUS; SUBCUTANEOUS at 22:13

## 2018-01-10 RX ADMIN — MULTIVITAMIN TABLET SCH TAB: TABLET at 09:58

## 2018-01-10 RX ADMIN — HEPARIN SODIUM SCH UNIT: 5000 INJECTION, SOLUTION INTRAVENOUS; SUBCUTANEOUS at 05:31

## 2018-01-10 NOTE — PN
Physical Exam: 


SUBJECTIVE: Patient seen and examined earlier in morning. No acute events 

overnight. No new complaints. 





OBJECTIVE:





 Vital Signs











 Period  Temp  Pulse  Resp  BP Sys/Penaloza  Pulse Ox


 


 Last 24 Hr  97.7 F-97.9 F  66-88  20-20  118-129/70-79  93-96











GENERAL: NAD, awake, alert, laying in bed, thin-appearing.


HEENT: No JVD, sclera anicteric, EOMI, moist mucosa


LUNGS: Coarse breath sounds anteriorly No wheezes. no rales. No accessory 

muscle use. Currently on 4LNC.  


HEART: RRR, S1, S2 with 3/6 systolic murmur at LLSB auscultated


ABDOMEN: Soft, nontender, nondistended, normoactive bowel sounds, no guarding, 

no hepatomegaly


EXTREMITIES: 2+ DP pulses, warm, well-perfused, no edema b/l


NEUROLOGICAL: Normal speech, gait not observed.


SKIN: Warm, no rashes noted














Active Medications











Generic Name Dose Route Start Last Admin





  Trade Name Freq  PRN Reason Stop Dose Admin


 


Acetaminophen  1,000 mg  12/28/17 16:51  





  Ofirmev Injection -  IVPB   





  Q6H PRN   





  FEVER OR PAIN   


 


Albuterol Sulfate  1 amp  12/28/17 16:51  01/04/18 10:11





  Ventolin 0.083% Nebulizer Soln -  NEB   1 amp





  Q4H PRN   Administration





  SHORT OF BREATH/WHEEZING   


 


Albuterol/Ipratropium  1 amp  01/09/18 10:22  01/10/18 17:00





  Duoneb -  NEB   1 amp





  RQID GABY   Administration


 


Amino Acids  30 ml  12/29/17 17:30  01/10/18 17:19





  Prosource No Carb Liquid Pkt  PO   30 ml





  BID@0800,1730 GABY   Administration


 


Heparin Sodium (Porcine)  5,000 unit  12/28/17 22:00  01/10/18 14:47





  Heparin -  SQ   5,000 unit





  TID GABY   Administration


 


Cefepime HCl  1 gm in 50 mls @ 100 mls/hr  01/10/18 10:00  01/10/18 11:29





  Maxipime 1 Gm Premix Ivpb  IVPB   100 mls/hr





  Q12H GABY   Administration


 


Methylprednisolone Sodium Succinate  40 mg  01/10/18 10:00  01/10/18 09:58





  Solu-Medrol -  IVPUSH   40 mg





  DAILY GABY   Administration


 


Multivitamins/Minerals/Vitamin C  1 tab  12/29/17 10:00  01/10/18 09:58





  Tab-A-Vit -  PO   1 tab





  DAILY GABY   Administration


 


Pantoprazole Sodium  40 mg  12/31/17 10:00  01/10/18 09:58





  Protonix -  PO   40 mg





  DAILY G[facility]   Administration


 


Scopolamine HBr  1 patch  01/07/18 12:30  01/10/18 13:09





  Transderm-Scop -  TD   1 patch





  Q3D GABY   Administration











ASSESSMENT/PLAN:


85yo M with h/o COPD (baseline 3LNC at night), HTN, OA who was found to acute 

hypoxic hypercapnic distress. 





1) Acute hypoxic hypercapnic respiratory distress


   --2/2 to PNA most likely with component of COPD


   --Continue Medrol 40mg IVP qDaily


   --Maintain SpO2 >88-90%


   --Pulmonology on board


   --Duoneb QID gaby


   --Nebs PRN q4h





2) Sepsis 2/2 to PNA


   --Cefepime day 3


   --Suspected on-going aspiration due to no improvement in breath sounds


      --Repeat barium swallow with suggestion of PEG


         --Pt, Pt's HCP Elle Rivera and I had discussion regarding PEG tube 

and some goals of care. Pt and HCP leaning towards PEG placement however wanted 

to discuss the possibilities.


   --Afebrile


   --Leukocytosis 16.7, most likely steroids however will monitor





3) HTN


   --Currently controlled


   --Continue BP monitoring





FEN:


   Fluids: None; suspected hypervolemia


   Electrolyte abnormalities: None


   Nutrition: Dysphagia puree with honey-thick liquids





PPX:


   DVT - Heparin SQ TID





Code Status: DNR/DNI, no pressors, no central lines


Dispo: Pt and HCP to make their decision about PEG


Case discussed with Dr. Johnny Davila, DO - Internal Medicine PGY-1








Visit type





- Emergency Visit


Emergency Visit: No





- New Patient


This patient is new to me today: No





- Critical Care


Critical Care patient: No

## 2018-01-10 NOTE — PN
Teaching Attending Note


Name of Resident: Earl Davila





ATTENDING PHYSICIAN STATEMENT





I saw and evaluated the patient.


I reviewed the resident's note and discussed the case with the resident.


I agree with the resident's findings and plan as documented.








SUBJECTIVE:asymptomatic. continues to have cough but claims not related to 

eating. dneies CP, SOB, fever, chills, N/V/C/D








OBJECTIVE:





 Last Vital Signs











Temp Pulse Resp BP Pulse Ox


 


 97.9 F   82   20   118/74   93 L


 


 01/10/18 10:11  01/10/18 10:11  01/10/18 10:11  01/10/18 10:11  01/10/18 09:00











General NAD


Lungs coarse breath sounds no crackles or wheezing


Extremities no pedal edema





ASSESSMENT AND PLAN:


86 year old male with a PMHx of HTN, COPD (3L NC at night), Osteoarthritis, 

Dysphagia, PUD presented to the ER with acute hypoxic respiratory distress


1. Acute hypoxic hypercapnic respiratory distress- due to possible PNA vs acute 

on chronic COPD. appears to be aspiration at this time as started to worsen. CT 

chest showing RLL infiltrate. family discussion today regarding PEG placement. 

family decided to proceed with PEG for hydration and enough caloric intake. GI 

consulted. on Cefipime. will decrease medrol to 40mg daily. titrate as 

tolerated. saturating 92% on 2L NC. Supplemental oxygen to maintain spO2 >88%. 

pulmonary on board


2. Sepsis due to aspiration PNA- afebrile. leukocytosis trending down. on 

cefipime day 3. after completing course of vanco/zosyn. Flu and legonella 

negative. ID on board


3. Dysphagia- on puree diet. GI consulted for PEG placement


4. Hypophosphatemia- resolved


5. hypokalemia-resolved


6. HTN- currently normotensive. not on medications at home. cont to monitor. 


7. PUD- no sings of bleeding. stress ulcer ppx. protonix po


8. DVT ppx- hep sq


9. Poor prognosis. palliative care on board. DNR/DNI, no central line or 

pressors.

## 2018-01-10 NOTE — PN
Progress Note (short form)





- Note


Progress Note: 





PULMONARY





RESTING COMFORTABLY/FAMILY PRESENT





VSS/AFEBRILE





PALE/ANICTERIC


BIBASILAR DIMINISHED BREATH SOUNDS/SCATTERED RHONCHI


S1S2


BS+


HEEL OFF- NO EDEMA





LABS/IMAGES/MEDS/NOTES REVIEWED





Hypoxemic respiratory failure


Multilobar PNA/? aspiration 


Fluid overload 


Acute COPD exacerbation  


HTN





-wants peg 


-ABX per ID 


-BD TX 


-medrol   


-heparin SubQ


-protonix for GI ppx 


-O2 to maintain saturation 


-DNR/DNI


-Aspiration precautions





BRIAN TRAN MD 














Problem List





- Problems


(1) COPD (chronic obstructive pulmonary disease)


Code(s): J44.9 - CHRONIC OBSTRUCTIVE PULMONARY DISEASE, UNSPECIFIED   


Qualifiers: 


   Qualified Code(s): J44.0 - Chronic obstructive pulmonary disease with acute 

lower respiratory infection   





(2) Pneumonia


Code(s): J18.9 - PNEUMONIA, UNSPECIFIED ORGANISM   


Qualifiers: 


   Qualified Code(s): J18.1 - Lobar pneumonia, unspecified organism   





(3) Anemia


Code(s): D64.9 - ANEMIA, UNSPECIFIED   





(4) Sepsis


Code(s): A41.9 - SEPSIS, UNSPECIFIED ORGANISM   


Qualifiers: 


   Qualified Code(s): A41.9 - Sepsis, unspecified organism   





(5) Hypertension


Code(s): I10 - ESSENTIAL (PRIMARY) HYPERTENSION   





(6) Chronic steroid use


Code(s): EXD7859 -

## 2018-01-10 NOTE — PN
Progress Note, SLP





- Note


Progress Note: 





 Selected Entries











  01/09/18 01/09/18 01/09/18





  06:00 09:00 14:51


 


Breakfast   


 


Temperature 98.2 F 97.6 F 97.3 F L














  01/09/18 01/09/18 01/10/18





  17:44 21:56 05:00


 


Breakfast   


 


Temperature 97.4 F L 97.8 F 97.7 F














  01/10/18 01/10/18





  10:11 10:17


 


Breakfast  50%


 


Temperature 97.9 F 








 Laboratory Tests











  01/07/18 01/08/18 01/09/18





  06:30 07:00 06:00


 


WBC  13.8 H  16.7 H  15.7 H








Reviewed with pt,staff multiple effortful swallows per bite and honey thick on 

tsp.





I spoke with Mr. Loomis and his HCP. They would like PEG placement. Hopefully 

PEG can be used for hydration, medication, and supplemental nutrition, with 

puree continued PO.


Suggest continued swallowing tx upon d/c at [facility].

## 2018-01-11 LAB
BASOPHILS # BLD: 0.2 % (ref 0–2)
DEPRECATED RDW RBC AUTO: 17.5 % (ref 11.9–15.9)
EOSINOPHIL # BLD: 0.6 % (ref 0–4.5)
HCT VFR BLD CALC: 40.1 % (ref 35.4–49)
HGB BLD-MCNC: 12.1 GM/DL (ref 11.7–16.9)
LYMPHOCYTES # BLD: 8.2 % (ref 8–40)
MCH RBC QN AUTO: 28.2 PG (ref 25.7–33.7)
MCHC RBC AUTO-ENTMCNC: 30.2 G/DL (ref 32–35.9)
MCV RBC: 93.5 FL (ref 80–96)
MONOCYTES # BLD AUTO: 8.6 % (ref 3.8–10.2)
NEUTROPHILS # BLD: 82.4 % (ref 42.8–82.8)
PLATELET # BLD AUTO: 255 K/MM3 (ref 134–434)
PMV BLD: 8.3 FL (ref 7.5–11.1)
RBC # BLD AUTO: 4.29 M/MM3 (ref 4–5.6)
WBC # BLD AUTO: 12.3 K/MM3 (ref 4–10)

## 2018-01-11 RX ADMIN — IPRATROPIUM BROMIDE AND ALBUTEROL SULFATE SCH AMP: .5; 3 SOLUTION RESPIRATORY (INHALATION) at 12:39

## 2018-01-11 RX ADMIN — IPRATROPIUM BROMIDE AND ALBUTEROL SULFATE SCH AMP: .5; 3 SOLUTION RESPIRATORY (INHALATION) at 08:45

## 2018-01-11 RX ADMIN — IPRATROPIUM BROMIDE AND ALBUTEROL SULFATE SCH AMP: .5; 3 SOLUTION RESPIRATORY (INHALATION) at 20:57

## 2018-01-11 RX ADMIN — PANTOPRAZOLE SODIUM SCH: 40 GRANULE, DELAYED RELEASE ORAL at 14:10

## 2018-01-11 RX ADMIN — IPRATROPIUM BROMIDE AND ALBUTEROL SULFATE SCH AMP: .5; 3 SOLUTION RESPIRATORY (INHALATION) at 16:38

## 2018-01-11 RX ADMIN — PREDNISONE SCH MG: 20 TABLET ORAL at 10:57

## 2018-01-11 RX ADMIN — Medication SCH ML: at 08:15

## 2018-01-11 RX ADMIN — HEPARIN SODIUM SCH UNIT: 5000 INJECTION, SOLUTION INTRAVENOUS; SUBCUTANEOUS at 21:29

## 2018-01-11 RX ADMIN — CEFEPIME HYDROCHLORIDE SCH MLS/HR: 1 INJECTION, SOLUTION INTRAVENOUS at 21:29

## 2018-01-11 RX ADMIN — HEPARIN SODIUM SCH UNIT: 5000 INJECTION, SOLUTION INTRAVENOUS; SUBCUTANEOUS at 15:00

## 2018-01-11 RX ADMIN — PANTOPRAZOLE SODIUM SCH MG: 40 TABLET, DELAYED RELEASE ORAL at 10:57

## 2018-01-11 RX ADMIN — Medication SCH ML: at 17:20

## 2018-01-11 RX ADMIN — CEFEPIME HYDROCHLORIDE SCH MLS/HR: 1 INJECTION, SOLUTION INTRAVENOUS at 11:35

## 2018-01-11 RX ADMIN — HEPARIN SODIUM SCH UNIT: 5000 INJECTION, SOLUTION INTRAVENOUS; SUBCUTANEOUS at 06:32

## 2018-01-11 RX ADMIN — MULTIVITAMIN TABLET SCH TAB: TABLET at 10:57

## 2018-01-11 NOTE — PN
Progress Note (short form)





- Note


Progress Note: 


alert


less cough


less sob











 


 Vital Signs











 Period  Temp  Pulse  Resp  BP Sys/Penaloza  Pulse Ox


 


 Last 24 Hr  97.3 F-98.4 F  67-87  20-20  112-122/63-73  93-96








cor-rrr


lungs decreased bs at bases


abd soft,nt


ext no edema





 CBC, BMP





 01/11/18 08:57 





 01/08/18 07:00 





 Microbiology





12/27/17 09:15   Blood - Peripheral Venous   Blood Culture - Final


                            NO GROWTH AFTER 5 DAYS INCUBATION


12/27/17 09:15   Blood - Peripheral Venous   Blood Culture - Final


                            NO GROWTH AFTER 5 DAYS INCUBATION


12/27/17 22:52   Urine - Urine Clean Catch   Urine Culture - Final


                            NO GROWTH OBTAINED


12/28/17 18:15   Urine For Antigen Detection   Legionella Antigen - Final


12/28/17 18:15   Urine For Antigen Detection   Streptococcus pneumoniae Antigen 

(M - Final


12/27/17 17:30   Nasopharyngeal Swab   Influenza Types A,B Antigen (JAISON) - Final


12/27/17 17:30   Nasopharyngeal Swab    - Final








 








 Current Medications





Acetaminophen (Ofirmev Injection -)  1,000 mg IVPB Q6H PRN


   PRN Reason: FEVER OR PAIN


Albuterol Sulfate (Ventolin 0.083% Nebulizer Soln -)  1 amp NEB Q4H PRN


   PRN Reason: SHORT OF BREATH/WHEEZING


   Last Admin: 01/04/18 10:11 Dose:  1 amp


Albuterol/Ipratropium (Duoneb -)  1 amp NEB RQID [facility]


   Last Admin: 01/11/18 16:38 Dose:  1 amp


Amino Acids (Prosource No Carb Liquid Pkt)  30 ml PO BID@0800,1730 [facility]


   Last Admin: 01/11/18 17:20 Dose:  30 ml


Heparin Sodium (Porcine) (Heparin -)  5,000 unit SQ TID [facility]


   Last Admin: 01/11/18 15:00 Dose:  5,000 unit


Cefepime HCl (Maxipime 1 Gm Premix Ivpb)  1 gm in 50 mls @ 100 mls/hr IVPB Q12H 

[facility]


   Last Admin: 01/11/18 11:35 Dose:  100 mls/hr


Multivitamins/Minerals/Vitamin C (Tab-A-Vit -)  1 tab PO DAILY [facility]


   Last Admin: 01/11/18 10:57 Dose:  1 tab


Pantoprazole Sodium (Protonix Packets For Oral Suspension -)  40 mg PO DAILY [facility]


   Last Admin: 01/11/18 14:10 Dose:  Not Given


Prednisone (Deltasone -)  40 mg PO DAILY [facility]


   Last Admin: 01/11/18 10:57 Dose:  40 mg


Scopolamine HBr (Transderm-Scop -)  1 patch TD Q3D [facility]


   Last Admin: 01/10/18 13:09 Dose:  1 patch





a/p


aspiration pneumonia- ct scan with RLL infiltrate


rising WBC- ?infection, ?steroids


cefepime day #4 , wbc trending down


modify diet and meds


palliative care








COPD


?CHF














Problem List





- Problems


(1) Pneumonia


Code(s): J18.9 - PNEUMONIA, UNSPECIFIED ORGANISM   


Qualifiers: 


   Pneumonia type: due to unspecified organism   Laterality: left   Lung 

location: lower lobe of lung   Qualified Code(s): J18.1 - Lobar pneumonia, 

unspecified organism   





(2) Respiratory failure


Code(s): J96.90 - RESPIRATORY FAILURE, UNSP, UNSP W HYPOXIA OR HYPERCAPNIA   





(3) COPD (chronic obstructive pulmonary disease)


Code(s): J44.9 - CHRONIC OBSTRUCTIVE PULMONARY DISEASE, UNSPECIFIED   


Qualifiers: 


   COPD type: COPD with acute lower respiratory infection   Qualified Code(s): 

J44.0 - Chronic obstructive pulmonary disease with acute lower respiratory 

infection

## 2018-01-11 NOTE — PN
Physical Exam: 


SUBJECTIVE: No acute events overnight. No new complaints voiced today.








OBJECTIVE:





 Vital Signs











 Period  Temp  Pulse  Resp  BP Sys/Penaloza  Pulse Ox


 


 Last 24 Hr  97.3 F-98.4 F  67-82  20-20  112-122/67-74  93











GENERAL: NAD, awake, alert, laying in bed, thin-appearing.


HEENT: No JVD, sclera anicteric, EOMI, moist mucosa


LUNGS: Coarse breath sounds anteriorly No wheezes. no rales. No accessory 

muscle use. Currently on 4LNC.  


HEART: RRR, S1, S2 with 3/6 systolic murmur at LLSB auscultated


ABDOMEN: Soft, nontender, nondistended, normoactive bowel sounds, no guarding, 

no hepatomegaly


EXTREMITIES: 2+ DP pulses, warm, well-perfused, no edema b/l


NEUROLOGICAL: Normal speech, gait not observed.


SKIN: Warm, no rashes noted














Active Medications











Generic Name Dose Route Start Last Admin





  Trade Name Freq  PRN Reason Stop Dose Admin


 


Acetaminophen  1,000 mg  12/28/17 16:51  





  Ofirmev Injection -  IVPB   





  Q6H PRN   





  FEVER OR PAIN   


 


Albuterol Sulfate  1 amp  12/28/17 16:51  01/04/18 10:11





  Ventolin 0.083% Nebulizer Soln -  NEB   1 amp





  Q4H PRN   Administration





  SHORT OF BREATH/WHEEZING   


 


Albuterol/Ipratropium  1 amp  01/09/18 10:22  01/10/18 20:30





  Duoneb -  NEB   1 amp





  RQID GABY   Administration


 


Amino Acids  30 ml  12/29/17 17:30  01/11/18 08:15





  Prosource No Carb Liquid Pkt  PO   30 ml





  BID@0800,1730 GABY   Administration


 


Heparin Sodium (Porcine)  5,000 unit  12/28/17 22:00  01/11/18 06:32





  Heparin -  SQ   5,000 unit





  TID GABY   Administration


 


Cefepime HCl  1 gm in 50 mls @ 100 mls/hr  01/10/18 10:00  01/10/18 22:14





  Maxipime 1 Gm Premix Ivpb  IVPB   100 mls/hr





  Q12H GABY   Administration


 


Multivitamins/Minerals/Vitamin C  1 tab  12/29/17 10:00  01/10/18 09:58





  Tab-A-Vit -  PO   1 tab





  DAILY GABY   Administration


 


Pantoprazole Sodium  40 mg  12/31/17 10:00  01/10/18 09:58





  Protonix -  PO   40 mg





  DAILY GABY   Administration


 


Prednisone  40 mg  01/11/18 10:00  





  Deltasone -  PO   





  DAILY [facility]   


 


Scopolamine HBr  1 patch  01/07/18 12:30  01/10/18 13:09





  Transderm-Scop -  TD   1 patch





  Q3D GABY   Administration











ASSESSMENT/PLAN:


85yo M with h/o COPD (baseline 3LNC at night), HTN, OA who was found to acute 

hypoxic hypercapnic distress. 





1) Acute hypoxic hypercapnic respiratory distress


   --2/2 to PNA most likely with component of COPD


   --Change to Prednisone 40mg qDaily (day 1 of 3)


   --Maintain SpO2 >88-90%


   --Pulmonology on board


   --Duoneb QID gaby


   --Nebs PRN q4h





2) Sepsis 2/2 to PNA


   --Cefepime day 4


   --Suspected on-going aspiration due to no improvement in breath sounds


      --Per PEG tube: pt to be evaluated by Dr. Jose today -- if his 

leukocytosis is clearly trending down procedure will be in effect


         --NPO after midnight; hold heparin dose


   --Afebrile


   --Leukocytosis 16.7, most likely steroids however will monitor





3) HTN


   --Currently controlled


   --Continue BP monitoring





FEN:


   Fluids: None; suspected hypervolemia


   Electrolyte abnormalities: None


   Nutrition: Dysphagia puree with honey-thick liquids





PPX:


   DVT - Heparin SQ TID





Code Status: DNR/DNI, no pressors, no central lines


Dispo: possible PEG tube tomorrow


Case discussed with Dr. Johnny Davila, DO - Internal Medicine PGY-1





Visit type





- Emergency Visit


Emergency Visit: No





- New Patient


This patient is new to me today: No





- Critical Care


Critical Care patient: No

## 2018-01-11 NOTE — PN
Progress Note (short form)





- Note


Progress Note: 





PULMONARY





States breathing slightly better. Sitting on edge of bed with PT, not standing 

yet. +nonproductive cough. No fevers or chills.





 Last Vital Signs











Temp Pulse Resp BP Pulse Ox


 


 98.4 F   81   20   122/73   95 


 


 01/11/18 05:46  01/11/18 09:07  01/11/18 05:46  01/11/18 05:46  01/11/18 09:07











Gen:  less tachypneic with speaking


Heart: RRR


Lung: bilateral expiratory rhonchi but less


Abd: soft, nontender


Ext: no edema





 CBC, BMP





 01/11/18 08:57 





 01/08/18 07:00 





Active Medications





Acetaminophen (Ofirmev Injection -)  1,000 mg IVPB Q6H PRN


   PRN Reason: FEVER OR PAIN


Albuterol Sulfate (Ventolin 0.083% Nebulizer Soln -)  1 amp NEB Q4H PRN


   PRN Reason: SHORT OF BREATH/WHEEZING


   Last Admin: 01/04/18 10:11 Dose:  1 amp


Albuterol/Ipratropium (Duoneb -)  1 amp NEB RQID [facility]


   Last Admin: 01/11/18 08:45 Dose:  1 amp


Amino Acids (Prosource No Carb Liquid Pkt)  30 ml PO BID@0800,1730 [facility]


   Last Admin: 01/11/18 08:15 Dose:  30 ml


Heparin Sodium (Porcine) (Heparin -)  5,000 unit SQ TID [facility]


   Last Admin: 01/11/18 06:32 Dose:  5,000 unit


Cefepime HCl (Maxipime 1 Gm Premix Ivpb)  1 gm in 50 mls @ 100 mls/hr IVPB Q12H 

[facility]


   Last Admin: 01/10/18 22:14 Dose:  100 mls/hr


Multivitamins/Minerals/Vitamin C (Tab-A-Vit -)  1 tab PO DAILY [facility]


   Last Admin: 01/10/18 09:58 Dose:  1 tab


Pantoprazole Sodium (Protonix -)  40 mg PO DAILY [facility]


   Last Admin: 01/10/18 09:58 Dose:  40 mg


Prednisone (Deltasone -)  40 mg PO DAILY [facility]


Scopolamine HBr (Transderm-Scop -)  1 patch TD Q3D [facility]


   Last Admin: 01/10/18 13:09 Dose:  1 patch








A/P


Acute COPD/Bronchiectasis Exacerbation


Acute on Chronic Hypoxic Respiratory Failure


r/o Pneumonia


Emphysema


+Troponins likely Demand Ischemia


HTN





-  antibiotics per ID


-  slow prednisone taper, would taper by 10mg q5-7 days 


-  inhaled bronchodilators standing and PRN


-  O2 to keep SpO2 >90%


-  OOB to chair if possible


-  rehab/physical therapy


-  DVT prophylaxis

## 2018-01-11 NOTE — CON.GI
Consult


Consult Specialty:: GI





- History of Present Illness


History of Present Illness: 





Chart reviewed. Events noted. 





An 86 yom treated for aspiration PNA requires alternative mode of feeding. 

Speech and swallow recommendation noted. Patient and family wishes regarding 

PEG noted. 











- History Source


History Provided By: Patient, Medical Record





- Past Medical History


CNS: No: Alzheimer's


Cardio/Vascular: No: AFIB


Pulmonary: Yes: COPD, O2 Dependent, Pneumonia


Gastrointestinal: Yes: Diverticulosis, GI Bleed, Peptic Ulcer Disease, Other (

GASTRIC OUTLET OBSTRUCTION)


Hepatobiliary: No: Cirrhosis


Renal/: No: Renal Failure


Infectious Disease: No: AIDS


Psych: No: Addictions





- Past Surgical History


Additional Surgical History: UNABLE TO OBTAIN ACCURATE INFO/BACK SURG MULTIPLE





- Alcohol/Substance Use


Hx Alcohol Use: No


History of Substance Use: reports: None





- Smoking History


Smoking history: Unknown if ever smoked





- Social History


Usual Living Arrangement: Nursing Home


ADL: Support Services


History of Recent Travel: No





Home Medications





- Allergies


Allergies/Adverse Reactions: 


 Allergies











Allergy/AdvReac Type Severity Reaction Status Date / Time


 


No Known Allergies Allergy   Verified 12/27/17 08:24














- Home Medications


Home Medications: 


Ambulatory Orders





Acetaminophen [Tylenol] 2 tab PO Q6H PRN 12/27/17 


Albuterol 2.5/Ipratropium 0.5 [Duoneb -] 1 amp NEB TID 12/27/17 


Budesonide [Pulmicort 0.5 mg Nebulizer -] 1 neb PO BID 12/27/17 


Ipratropium 0.02% Nebulizer [Atrovent 0.02% Nebulizer -] 1 amp NEB Q6H PRN 12/27 /17 


Menthol/Phenol [Cepastat Lozenge -] 1 each MM QID PRN 12/27/17 


Metoclopramide HCl 10 mg PO TID 12/27/17 


Prednisone 10 mg PO BID 12/27/17 


Tramadol HCl [Ultram] 2 mg PO DAILY 12/27/17 











Family Disease History





- Family Disease History


Family History: Unremarkable





Review of Systems


Findings/Remarks: 





as per H&P





Physical Exam-GI


Vital Signs: 


 Vital Signs











Temperature  98.4 F   01/11/18 05:46


 


Pulse Rate  67   01/11/18 05:46


 


Respiratory Rate  20   01/11/18 05:46


 


Blood Pressure  122/73   01/11/18 05:46


 


O2 Sat by Pulse Oximetry (%)  93 L  01/10/18 20:22











Constitutional: Yes: No Distress, Calm


Eyes: Yes: Conjunctiva Clear


HENT: Yes: Atraumatic


Neck: Yes: Supple


Cardiovascular: Yes: Regular Rate and Rhythm


Respiratory: Yes: Rhonchi, Wheezes


Gastrointestinal Inspection: No: Ascites, Distention


...Auscultate: Yes: Normoactive Bowel Sounds


...Palpate: Yes: Soft.  No: Firm/Rigid, Guarding, Mass, Tenderness, Tenderness, 

Epigastium, Tenderness, Rebound


...Percussion: No: Fluid Wave


Neurological: Yes: Alert, Oriented


Labs: 


 CBC, BMP





 01/09/18 06:00 





 01/08/18 07:00 





 INR, PTT











INR  1.12  (0.82-1.09)   12/27/17  09:15    














Imaging





- Results


Cat Scan: Report Reviewed (Chest)





Problem List





- Problems


(1) Aspiration into airway


Code(s): T17.908A - UNSP FB IN RESP TRACT, PART UNSP CAUSING OTH INJURY, INIT   





(2) Inadequate oral nutritional intake


Code(s): R63.8 - OTHER SYMPTOMS AND SIGNS CONCERNING FOOD AND FLUID INTAKE   





Assessment/Plan





Stills has mild respiratory symptoms, otherwise clinically appears well. We 

discussed, and pt agreed to PEG.


Continue current management


Plan PEG when leukocytes resolves, or consistently trending down


Will follow

## 2018-01-11 NOTE — PN
Teaching Attending Note


Name of Resident: Earl Davila





ATTENDING PHYSICIAN STATEMENT





I saw and evaluated the patient.


I reviewed the resident's note and discussed the case with the resident.


I agree with the resident's findings and plan as documented.








SUBJECTIVE:asymptomatic. denies CP, SOB, fever, chills, N/V/C/D, cough








OBJECTIVE:


 Last Vital Signs











Temp Pulse Resp BP Pulse Ox


 


 97.4 F L  87   20   121/73   95 


 


 01/11/18 10:00  01/11/18 10:00  01/11/18 10:00  01/11/18 10:00  01/11/18 09:07











General NAD


Lungs coarse breath sounds no crackles or wheezing


Extremities no pedal edema





ASSESSMENT AND PLAN:


86 year old male with a PMHx of HTN, COPD (3L NC at night), Osteoarthritis, 

Dysphagia, PUD presented to the ER with acute hypoxic respiratory distress


1. Acute hypoxic hypercapnic respiratory distress- due to possible PNA vs acute 

on chronic COPD. appears to be aspiration at this time as started to worsen. 

slowly improving. switch medrol to prednsione po. plan for PEG placement once 

leukocytosis improves. GI and pulmonary on board. saturating 92% on 2L NC. 

Supplemental oxygen to maintain spO2 >88%. 


2. Sepsis due to aspiration PNA- afebrile. leukocytosis trending down. on 

cefipime day 4. after completing course of vanco/zosyn. Flu and legonella 

negative. ID on board


3. Dysphagia- on puree diet. plan is for PEG. will d/w GI if able to do 

tomorrow if leukocytosis resolved.


4. Hypophosphatemia- resolved


5. hypokalemia-resolved


6. HTN- currently normotensive. not on medications at home. cont to monitor. 


7. PUD- no sings of bleeding. stress ulcer ppx. protonix po


8. DVT ppx- hep sq


9. Poor prognosis. palliative care on board. DNR/DNI, no central line or 

pressors. family desires pt to return to [facility] after peg placement. not 

interested in Hospice at this time

## 2018-01-11 NOTE — PN
Progress Note, SLP





- Note


Progress Note: 





GI note appreciated. 





Per GI-Stills has mild respiratory symptoms, otherwise clinically appears well. 

We discussed, and pt agreed to PEG.


Continue current management


Plan PEG when leukocytes resolves, or consistently trending down


 Selected Entries











  01/10/18 01/10/18 01/11/18





  10:17 14:39 10:44


 


Breakfast 50%  50%


 


Lunch  50% 








 Laboratory Tests











  01/08/18 01/09/18 01/11/18





  07:00 06:00 08:57


 


WBC  16.7 H  15.7 H  12.3 H








Although pt is now fed and reminded to perform chin tuck and effortful swallow 

twice per tsp, follow through is not always achieved.





May consider holding PO honey thick liquids for now with anticipation that 

pulmonary status will improve more quickly.

## 2018-01-12 LAB
ANION GAP SERPL CALC-SCNC: 6 MMOL/L (ref 8–16)
BUN SERPL-MCNC: 20 MG/DL (ref 7–18)
CALCIUM SERPL-MCNC: 8.6 MG/DL (ref 8.5–10.1)
CHLORIDE SERPL-SCNC: 102 MMOL/L (ref 98–107)
CO2 SERPL-SCNC: 32 MMOL/L (ref 21–32)
CREAT SERPL-MCNC: 0.3 MG/DL (ref 0.7–1.3)
DEPRECATED RDW RBC AUTO: 17.3 % (ref 11.9–15.9)
GLUCOSE SERPL-MCNC: 79 MG/DL (ref 74–106)
HCT VFR BLD CALC: 38.9 % (ref 35.4–49)
HGB BLD-MCNC: 11.9 GM/DL (ref 11.7–16.9)
INR BLD: 0.96 (ref 0.82–1.09)
MCH RBC QN AUTO: 28.8 PG (ref 25.7–33.7)
MCHC RBC AUTO-ENTMCNC: 30.7 G/DL (ref 32–35.9)
MCV RBC: 93.8 FL (ref 80–96)
PLATELET # BLD AUTO: 237 K/MM3 (ref 134–434)
PMV BLD: 8.8 FL (ref 7.5–11.1)
POTASSIUM SERPLBLD-SCNC: 4 MMOL/L (ref 3.5–5.1)
PT PNL PPP: 10.8 SEC (ref 9.98–11.88)
RBC # BLD AUTO: 4.15 M/MM3 (ref 4–5.6)
SODIUM SERPL-SCNC: 140 MMOL/L (ref 136–145)
WBC # BLD AUTO: 11.4 K/MM3 (ref 4–10)

## 2018-01-12 PROCEDURE — 0DH63UZ INSERTION OF FEEDING DEVICE INTO STOMACH, PERCUTANEOUS APPROACH: ICD-10-PCS | Performed by: INTERNAL MEDICINE

## 2018-01-12 RX ADMIN — CEFEPIME HYDROCHLORIDE SCH MLS/HR: 1 INJECTION, SOLUTION INTRAVENOUS at 22:12

## 2018-01-12 RX ADMIN — IPRATROPIUM BROMIDE AND ALBUTEROL SULFATE SCH AMP: .5; 3 SOLUTION RESPIRATORY (INHALATION) at 09:00

## 2018-01-12 RX ADMIN — IPRATROPIUM BROMIDE AND ALBUTEROL SULFATE SCH: .5; 3 SOLUTION RESPIRATORY (INHALATION) at 18:06

## 2018-01-12 RX ADMIN — MULTIVITAMIN TABLET SCH TAB: TABLET at 16:59

## 2018-01-12 RX ADMIN — Medication SCH ML: at 16:59

## 2018-01-12 RX ADMIN — IPRATROPIUM BROMIDE AND ALBUTEROL SULFATE SCH AMP: .5; 3 SOLUTION RESPIRATORY (INHALATION) at 21:11

## 2018-01-12 RX ADMIN — PREDNISONE SCH MG: 20 TABLET ORAL at 16:59

## 2018-01-12 RX ADMIN — IPRATROPIUM BROMIDE AND ALBUTEROL SULFATE SCH AMP: .5; 3 SOLUTION RESPIRATORY (INHALATION) at 11:31

## 2018-01-12 RX ADMIN — CEFEPIME HYDROCHLORIDE SCH MLS/HR: 1 INJECTION, SOLUTION INTRAVENOUS at 12:13

## 2018-01-12 RX ADMIN — HEPARIN SODIUM SCH: 5000 INJECTION, SOLUTION INTRAVENOUS; SUBCUTANEOUS at 05:19

## 2018-01-12 RX ADMIN — Medication SCH: at 16:59

## 2018-01-12 RX ADMIN — PANTOPRAZOLE SODIUM SCH MG: 40 GRANULE, DELAYED RELEASE ORAL at 16:59

## 2018-01-12 NOTE — PN
Progress Note, Physician


History of Present Illness: 





No events. Awake and alert. WBC trending down. Afebrile.








- Current Medication List


Current Medications: 


Active Medications





Acetaminophen (Ofirmev Injection -)  1,000 mg IVPB Q6H PRN


   PRN Reason: FEVER OR PAIN


Albuterol Sulfate (Ventolin 0.083% Nebulizer Soln -)  1 amp NEB Q4H PRN


   PRN Reason: SHORT OF BREATH/WHEEZING


   Last Admin: 01/04/18 10:11 Dose:  1 amp


Albuterol/Ipratropium (Duoneb -)  1 amp NEB RQID [facility]


   Last Admin: 01/12/18 09:00 Dose:  1 amp


Amino Acids (Prosource No Carb Liquid Pkt)  30 ml PO BID@0800,1730 [facility]


   Last Admin: 01/11/18 17:20 Dose:  30 ml


Cefepime HCl (Maxipime 1 Gm Premix Ivpb)  1 gm in 50 mls @ 100 mls/hr IVPB Q12H 

[facility]


   Last Admin: 01/11/18 21:29 Dose:  100 mls/hr


Multivitamins/Minerals/Vitamin C (Tab-A-Vit -)  1 tab PO DAILY [facility]


   Last Admin: 01/11/18 10:57 Dose:  1 tab


Pantoprazole Sodium (Protonix Packets For Oral Suspension -)  40 mg PO DAILY [facility]


   Last Admin: 01/11/18 14:10 Dose:  Not Given


Prednisone (Deltasone -)  40 mg PO DAILY [facility]


   Last Admin: 01/11/18 10:57 Dose:  40 mg


Scopolamine HBr (Transderm-Scop -)  1 patch TD Q3D [facility]


   Last Admin: 01/10/18 13:09 Dose:  1 patch











- Objective


Vital Signs: 


 Vital Signs











Temperature  98.4 F   01/12/18 06:00


 


Pulse Rate  65   01/12/18 06:00


 


Respiratory Rate  20   01/12/18 06:00


 


Blood Pressure  122/72   01/12/18 06:00


 


O2 Sat by Pulse Oximetry (%)  95   01/11/18 09:07











Constitutional: Yes: No Distress, Calm


Eyes: Yes: Conjunctiva Clear


HENT: Yes: Atraumatic


Neck: Yes: Supple


Cardiovascular: Yes: Regular Rate and Rhythm


Respiratory: Yes: Regular


Gastrointestinal: Yes: Normal Bowel Sounds, Soft.  No: Melena, Rectal Bleeding, 

Tenderness, Tenderness, Epigastrium, Tenderness, Rebound


Neurological: Yes: Alert, Oriented


Labs: 


 CBC, BMP





 01/12/18 06:35 





 01/12/18 06:35 





 INR, PTT











INR  0.96  (0.82-1.09)   01/12/18  06:35    








 Abnormal Lab Results











  01/12/18 01/12/18





  06:35 06:35


 


WBC  11.4 H 


 


MCHC  30.7 L 


 


RDW  17.3 H 


 


Anion Gap   6 L


 


BUN   20 H


 


Creatinine   0.3 L D














Problem List





- Problems


(1) Aspiration into airway


Code(s): T17.908A - UNSP FB IN RESP TRACT, PART UNSP CAUSING OTH INJURY, INIT   





(2) Inadequate oral nutritional intake


Code(s): R63.8 - OTHER SYMPTOMS AND SIGNS CONCERNING FOOD AND FLUID INTAKE   





Assessment/Plan








PEG today


Discussed with pt's nurse and resident


Msg to call back left with pt's HCP, Nicole @ [phone].

## 2018-01-12 NOTE — PN
Physical Exam: 


SUBJECTIVE: Leukocytosis trending down. Pt to have PEG today. No new complaints





OBJECTIVE:





 Vital Signs











 Period  Temp  Pulse  Resp  BP Sys/Penaloza  Pulse Ox


 


 Last 24 Hr  97.4 F-98.4 F  65-87  20-20  114-125/63-73  











GENERAL: NAD, awake, alert, laying in bed, thin-appearing.


HEENT: No JVD, sclera anicteric, EOMI, moist mucosa


LUNGS: Coarse breath sounds anteriorly No wheezes. no rales. No accessory 

muscle use. Currently on 4LNC.  


HEART: RRR, S1, S2 with 3/6 systolic murmur at LLSB auscultated


ABDOMEN: Soft, nontender, nondistended, normoactive bowel sounds, no guarding, 

no hepatomegaly


EXTREMITIES: 2+ DP pulses, warm, well-perfused, no edema b/l


NEUROLOGICAL: Normal speech, gait not observed.


SKIN: Warm, no rashes noted














 Laboratory Results - last 24 hr











  01/12/18 01/12/18 01/12/18





  06:35 06:35 06:35


 


WBC  11.4 H  


 


RBC  4.15  


 


Hgb  11.9  


 


Hct  38.9  


 


MCV  93.8  


 


MCH  28.8  


 


MCHC  30.7 L  


 


RDW  17.3 H  


 


Plt Count  237  


 


MPV  8.8  


 


PT with INR   10.80 


 


INR   0.96 


 


Sodium    140


 


Potassium    4.0


 


Chloride    102


 


Carbon Dioxide    32


 


Anion Gap    6 L


 


BUN    20 H


 


Creatinine    0.3 L D


 


Random Glucose    79  D


 


Calcium    8.6








Active Medications











Generic Name Dose Route Start Last Admin





  Trade Name Freq  PRN Reason Stop Dose Admin


 


Acetaminophen  1,000 mg  12/28/17 16:51  





  Ofirmev Injection -  IVPB   





  Q6H PRN   





  FEVER OR PAIN   


 


Albuterol Sulfate  1 amp  12/28/17 16:51  01/04/18 10:11





  Ventolin 0.083% Nebulizer Soln -  NEB   1 amp





  Q4H PRN   Administration





  SHORT OF BREATH/WHEEZING   


 


Albuterol/Ipratropium  1 amp  01/09/18 10:22  01/12/18 09:00





  Duoneb -  NEB   1 amp





  RQID [facility]   Administration


 


Amino Acids  30 ml  12/29/17 17:30  01/11/18 17:20





  Prosource No Carb Liquid Pkt  PO   30 ml





  BID@0800,1730 [facility]   Administration


 


Cefepime HCl  1 gm in 50 mls @ 100 mls/hr  01/10/18 10:00  01/11/18 21:29





  Maxipime 1 Gm Premix Ivpb  IVPB   100 mls/hr





  Q12H [facility]   Administration


 


Multivitamins/Minerals/Vitamin C  1 tab  12/29/17 10:00  01/11/18 10:57





  Tab-A-Vit -  PO   1 tab





  DAILY GABY   Administration


 


Pantoprazole Sodium  40 mg  01/11/18 13:15  01/11/18 14:10





  Protonix Packets For Oral Suspension -  PO   Not Given





  DAILY GABY   


 


Prednisone  40 mg  01/11/18 10:00  01/11/18 10:57





  Deltasone -  PO   40 mg





  DAILY GABY   Administration


 


Scopolamine HBr  1 patch  01/07/18 12:30  01/10/18 13:09





  Transderm-Scop -  TD   1 patch





  Q3D [facility]   Administration











ASSESSMENT/PLAN:


85yo M with h/o COPD (baseline 3LNC at night), HTN, OA who was found to acute 

hypoxic hypercapnic distress. 





1) Acute hypoxic hypercapnic respiratory distress


   --2/2 to PNA most likely with component of COPD


   --Change to Prednisone 40mg qDaily (day 2 of 3)


   --Maintain SpO2 >88-90%


   --Pulmonology on board


   --Duoneb QID gayb


   --Nebs PRN q4h





2) Sepsis 2/2 to PNA


   --Cefepime day 5


   --Suspected on-going aspiration due to no improvement in breath sounds


      --PEG tube to be placed today!


         --Medication use only; no food or fluids until approved by GI


   --Afebrile


   --Leukocytosis downtrending





3) HTN


   --Currently controlled


   --Continue BP monitoring





FEN:


   Fluids: None; suspected hypervolemia


   Electrolyte abnormalities: None


   Nutrition: Dysphagia puree with honey-thick liquids





PPX:


   DVT - Heparin SQ TID





Code Status: DNR/DNI, no pressors, no central lines


Dispo: D/C to [facility] over weekend highly likely


Case discussed with Dr. Leandro Davila, DO - Internal Medicine PGY-1





Visit type





- Emergency Visit


Emergency Visit: No





- New Patient


This patient is new to me today: No





- Critical Care


Critical Care patient: No

## 2018-01-12 NOTE — PN
Teaching Attending Note


Name of Resident: Earl Davila





ATTENDING PHYSICIAN STATEMENT





I saw and evaluated the patient.


I reviewed the resident's note and discussed the case with the resident.


I agree with the resident's findings and plan as documented.








SUBJECTIVE: Patient awake, alert and comfortable.








OBJECTIVE:


 Vital Signs











 Period  Temp  Pulse  Resp  BP Sys/Penaloza  Pulse Ox


 


 Last 24 Hr  97.4 F-98.4 F  65-91  18-20  117-143/63-80  90-99








HEART: S1S2, RRR


LUNGS: Scattered rhonchi


ABDOMEN: Soft, non-tender, non-distended, normal BS


EXTREMITIES: No edema





 Laboratory Results - last 24 hr











  01/12/18 01/12/18 01/12/18





  06:35 06:35 06:35


 


WBC  11.4 H  


 


RBC  4.15  


 


Hgb  11.9  


 


Hct  38.9  


 


MCV  93.8  


 


MCH  28.8  


 


MCHC  30.7 L  


 


RDW  17.3 H  


 


Plt Count  237  


 


MPV  8.8  


 


PT with INR   10.80 


 


INR   0.96 


 


Sodium    140


 


Potassium    4.0


 


Chloride    102


 


Carbon Dioxide    32


 


Anion Gap    6 L


 


BUN    20 H


 


Creatinine    0.3 L D


 


Random Glucose    79  D


 


Calcium    8.6








 Current Medications











Generic Name Dose Route Start Last Admin





  Trade Name Freq  PRN Reason Stop Dose Admin


 


Acetaminophen  1,000 mg  12/28/17 16:51  





  Ofirmev Injection -  IVPB   





  Q6H PRN   





  FEVER OR PAIN   


 


Acetaminophen/Codeine Phosphate  1 tab  01/12/18 14:58  





  Tylenol # 3 -  PO  01/13/18 14:57  





  Q4H PRN   





  PAIN   


 


Albuterol Sulfate  1 amp  12/28/17 16:51  01/04/18 10:11





  Ventolin 0.083% Nebulizer Soln -  NEB   1 amp





  Q4H PRN   Administration





  SHORT OF BREATH/WHEEZING   


 


Albuterol/Ipratropium  1 amp  01/09/18 10:22  01/12/18 11:31





  Duoneb -  NEB   1 amp





  RQID [facility]   Administration


 


Amino Acids  30 ml  12/29/17 17:30  01/11/18 17:20





  Prosource No Carb Liquid Pkt  PO   30 ml





  BID@0800,1730 [facility]   Administration


 


Cefepime HCl  1 gm in 50 mls @ 100 mls/hr  01/10/18 10:00  01/12/18 12:13





  Maxipime 1 Gm Premix Ivpb  IVPB   100 mls/hr





  Q12H [facility]   Administration


 


Sodium Chloride  1,000 mls @ 50 mls/hr  01/12/18 11:30  01/12/18 12:13





  Normal Saline -  IV  01/13/18 11:25  50 mls/hr





  ASDIR [facility]   Administration


 


Multivitamins/Minerals/Vitamin C  1 tab  12/29/17 10:00  01/11/18 10:57





  Tab-A-Vit -  PO   1 tab





  DAILY LIV   Administration


 


Pantoprazole Sodium  40 mg  01/11/18 13:15  01/11/18 14:10





  Protonix Packets For Oral Suspension -  PO   Not Given





  DAILY LIV   


 


Prednisone  40 mg  01/11/18 10:00  01/11/18 10:57





  Deltasone -  PO   40 mg





  DAILY LIV   Administration


 


Scopolamine HBr  1 patch  01/07/18 12:30  01/10/18 13:09





  Transderm-Scop -  TD   1 patch





  Q3D [facility]   Administration














ASSESSMENT AND PLAN:


This is an 86 year old man with history of HTN, COPD, osteoarthritis, dysphagia

, PUD who presented to the ER from [facility] with respiratory distress





1. Acute hypoxic and hypercapnic respiratory failure secondary to COPD and 

aspiration pneumonia


   - Continue Cefepime, Prednisone, DuoNeb


   - Continue oxygen to maintain saturation >90%


2. Sepsis secondary to aspiration pneumonia


   - Completed Zosyn, Vancomycin


   - Continue Cefepime


3. Dysphagia with aspiration


   - s/p PEG today


4. Hypophosphatemia


   - Resolved


5. Hypokalemia


   - Resolved


6. HTN


   - On no meds 


7. PUD


   - Continue Protonix


8. Disposition


   - Plan for discharge to [facility] once PEG can be used

## 2018-01-12 NOTE — PROC
Endoscopy Procedure


Endoscopy procedure completed. Please see scanned procedure report.





Please see PEG orders in [facility]


Do not use PEG for nutrition, or hydration until cleared by GI


OK to use peg for medications

## 2018-01-12 NOTE — PN
Progress Note (short form)





- Note


Progress Note: 





PULMONARY





RESTING COMFORTABLY/FAMILY PRESENT


AWAITING PEG





VSS/AFEBRILE





PALE/ANICTERIC


BIBASILAR DIMINISHED BREATH SOUNDS/SCATTERED RHONCHI


S1S2


BS+


HEEL OFF- NO EDEMA





LABS/IMAGES/MEDS/NOTES REVIEWED





Hypoxemic respiratory failure


Multilobar PNA/ aspiration 


Fluid overload 


Acute COPD exacerbation  


HTN





- peg 


-ABX per ID 


-BD TX 


- prednisone to taper


-heparin SubQ


-protonix for GI ppx 


-O2 to maintain saturation 


-DNR/DNI


-Aspiration precautions





BRIAN TRAN MD 














Problem List





- Problems


(1) COPD (chronic obstructive pulmonary disease)


Code(s): J44.9 - CHRONIC OBSTRUCTIVE PULMONARY DISEASE, UNSPECIFIED   


Qualifiers: 


   COPD type: COPD with acute lower respiratory infection   Qualified Code(s): 

J44.0 - Chronic obstructive pulmonary disease with acute lower respiratory 

infection   





(2) Pneumonia


Code(s): J18.9 - PNEUMONIA, UNSPECIFIED ORGANISM   


Qualifiers: 


   Pneumonia type: due to unspecified organism   Laterality: left   Lung 

location: lower lobe of lung   Qualified Code(s): J18.1 - Lobar pneumonia, 

unspecified organism   





(3) Anemia


Code(s): D64.9 - ANEMIA, UNSPECIFIED   





(4) Sepsis


Code(s): A41.9 - SEPSIS, UNSPECIFIED ORGANISM   


Qualifiers: 


   Sepsis type: sepsis due to unspecified organism   Qualified Code(s): A41.9 - 

Sepsis, unspecified organism   





(5) Hypertension


Code(s): I10 - ESSENTIAL (PRIMARY) HYPERTENSION   





(6) Chronic steroid use


Code(s): HOU7974 -

## 2018-01-13 RX ADMIN — IPRATROPIUM BROMIDE AND ALBUTEROL SULFATE SCH AMP: .5; 3 SOLUTION RESPIRATORY (INHALATION) at 08:35

## 2018-01-13 RX ADMIN — CEFEPIME HYDROCHLORIDE SCH MLS/HR: 1 INJECTION, SOLUTION INTRAVENOUS at 22:28

## 2018-01-13 RX ADMIN — IPRATROPIUM BROMIDE AND ALBUTEROL SULFATE SCH AMP: .5; 3 SOLUTION RESPIRATORY (INHALATION) at 22:00

## 2018-01-13 RX ADMIN — SCOPALAMINE SCH PATCH: 1 PATCH, EXTENDED RELEASE TRANSDERMAL at 13:48

## 2018-01-13 RX ADMIN — IPRATROPIUM BROMIDE AND ALBUTEROL SULFATE SCH AMP: .5; 3 SOLUTION RESPIRATORY (INHALATION) at 12:00

## 2018-01-13 RX ADMIN — Medication SCH ML: at 10:49

## 2018-01-13 RX ADMIN — IPRATROPIUM BROMIDE AND ALBUTEROL SULFATE SCH AMP: .5; 3 SOLUTION RESPIRATORY (INHALATION) at 16:30

## 2018-01-13 RX ADMIN — PANTOPRAZOLE SODIUM SCH MG: 40 GRANULE, DELAYED RELEASE ORAL at 10:49

## 2018-01-13 RX ADMIN — CEFEPIME HYDROCHLORIDE SCH MLS/HR: 1 INJECTION, SOLUTION INTRAVENOUS at 10:49

## 2018-01-13 RX ADMIN — MULTIVITAMIN TABLET SCH TAB: TABLET at 10:49

## 2018-01-13 RX ADMIN — Medication SCH ML: at 17:39

## 2018-01-13 NOTE — PN
Progress Note (short form)





- Note


Progress Note: 





PULMONARY





s/p PEG placement. States breathing slightly better. +nonproductive cough. No 

fevers or chills.





 Last Vital Signs











Temp Pulse Resp BP Pulse Ox


 


 97.6 F   91 H  20   117/81   92 L


 


 01/13/18 05:54  01/13/18 05:54  01/13/18 05:54  01/13/18 05:54  01/12/18 21:00











Gen:  less tachypneic with speaking


Heart: RRR


Lung: bilateral expiratory rhonchi but less


Abd: soft, nontender


Ext: no edema





 CBC, BMP





 01/12/18 06:35 





 01/12/18 06:35 





Active Medications





Acetaminophen (Ofirmev Injection -)  1,000 mg IVPB Q6H PRN


   PRN Reason: FEVER OR PAIN


Acetaminophen/Codeine Phosphate (Tylenol # 3 -)  1 tab PO Q4H PRN


   PRN Reason: PAIN


   Stop: 01/13/18 14:57


   Last Admin: 01/12/18 16:59 Dose:  1 tab


Albuterol Sulfate (Ventolin 0.083% Nebulizer Soln -)  1 amp NEB Q4H PRN


   PRN Reason: SHORT OF BREATH/WHEEZING


   Last Admin: 01/04/18 10:11 Dose:  1 amp


Albuterol/Ipratropium (Duoneb -)  1 amp NEB RQID [facility]


   Last Admin: 01/13/18 12:00 Dose:  1 amp


Amino Acids (Prosource No Carb Liquid Pkt)  30 ml PO BID@0800,1730 [facility]


   Last Admin: 01/13/18 10:49 Dose:  30 ml


Cefepime HCl (Maxipime 1 Gm Premix Ivpb)  1 gm in 50 mls @ 100 mls/hr IVPB Q12H 

[facility]


   Last Admin: 01/13/18 10:49 Dose:  100 mls/hr


Multivitamins/Minerals/Vitamin C (Tab-A-Vit -)  1 tab PO DAILY [facility]


   Last Admin: 01/13/18 10:49 Dose:  1 tab


Pantoprazole Sodium (Protonix Packets For Oral Suspension -)  40 mg PO DAILY [facility]


   Last Admin: 01/13/18 10:49 Dose:  40 mg


Prednisone (Deltasone -)  30 mg PO DAILY [facility]


   Last Admin: 01/13/18 10:49 Dose:  30 mg


Scopolamine HBr (Transderm-Scop -)  1 patch TD Q3D [facility]


   Last Admin: 01/10/18 13:09 Dose:  1 patch








A/P


Acute COPD/Bronchiectasis Exacerbation


Acute on Chronic Hypoxic Respiratory Failure


r/o Pneumonia


Emphysema


+Troponins likely Demand Ischemia


HTN





-  antibiotics per ID


-  slow prednisone taper, would taper by 10mg q5 days 


-  inhaled bronchodilators standing and PRN


-  O2 to keep SpO2 >90%


-  OOB to chair if possible


-  start enteral feeds


-  rehab/physical therapy


-  DVT prophylaxis

## 2018-01-13 NOTE — PN
Progress Note (short form)





- Note


Progress Note: 





no complaints. denies CP, SOB, fever, chills, N/V/C/D





 Current Medications











Generic Name Dose Route Start Last Admin





  Trade Name Freq  PRN Reason Stop Dose Admin


 


Acetaminophen  1,000 mg  12/28/17 16:51  





  Ofirmev Injection -  IVPB   





  Q6H PRN   





  FEVER OR PAIN   


 


Acetaminophen/Codeine Phosphate  1 tab  01/12/18 14:58  01/12/18 16:59





  Tylenol # 3 -  PO  01/13/18 14:57  1 tab





  Q4H PRN   Administration





  PAIN   


 


Albuterol Sulfate  1 amp  12/28/17 16:51  01/04/18 10:11





  Ventolin 0.083% Nebulizer Soln -  NEB   1 amp





  Q4H PRN   Administration





  SHORT OF BREATH/WHEEZING   


 


Albuterol/Ipratropium  1 amp  01/09/18 10:22  01/13/18 08:35





  Duoneb -  NEB   1 amp





  RQID [facility]   Administration


 


Amino Acids  30 ml  12/29/17 17:30  01/12/18 16:59





  Prosource No Carb Liquid Pkt  PO   30 ml





  BID@0800,1730 [facility]   Administration


 


Cefepime HCl  1 gm in 50 mls @ 100 mls/hr  01/10/18 10:00  01/12/18 22:12





  Maxipime 1 Gm Premix Ivpb  IVPB   100 mls/hr





  Q12H [facility]   Administration


 


Sodium Chloride  1,000 mls @ 50 mls/hr  01/12/18 11:30  01/12/18 12:13





  Normal Saline -  IV  01/13/18 11:25  50 mls/hr





  ASDIR [facility]   Administration


 


Multivitamins/Minerals/Vitamin C  1 tab  12/29/17 10:00  01/12/18 16:59





  Tab-A-Vit -  PO   1 tab





  DAILY LIV   Administration


 


Pantoprazole Sodium  40 mg  01/11/18 13:15  01/12/18 16:59





  Protonix Packets For Oral Suspension -  PO   40 mg





  DAILY LIV   Administration


 


Prednisone  40 mg  01/11/18 10:00  01/12/18 16:59





  Deltasone -  PO   40 mg





  DAILY LIV   Administration


 


Scopolamine HBr  1 patch  01/07/18 12:30  01/10/18 13:09





  Transderm-Scop -  TD   1 patch





  Q3D [facility]   Administration








 Last Vital Signs











Temp Pulse Resp BP Pulse Ox


 


 97.6 F   91 H  20   117/81   92 L


 


 01/13/18 05:54  01/13/18 05:54  01/13/18 05:54  01/13/18 05:54  01/12/18 21:00








General NAD


Lungs coarse breath sounds + crackles no wheezing


Abdomen soft NT/ND +abdominal binder. PEG under binder


Extremities no pedal edema


 





ASSESSMENT AND PLAN:


86 year old male with a PMHx of HTN, COPD (3L NC at night), Osteoarthritis, 

Dysphagia, PUD presented to the ER with acute hypoxic respiratory distress


1. Acute hypoxic hypercapnic respiratory distress- due to possible PNA vs acute 

on chronic COPD. +crackles. will d/c IVF. titrate down prednisone to 30mg. s/p 

PEG placement. cont aspiration precuations. NPO. GI and pulmonary on board. 

saturating 94% on 3.5L NC. Supplemental oxygen to maintain spO2 >88%. 


2. Sepsis due to aspiration PNA- afebrile. leukocytosis trending down. on 

cefipime day 6. after completing course of vanco/zosyn. Flu and legonella 

negative. ID on board


3. Dysphagia- on puree diet. s/p PEG 1/12. awaiting GI evaluation to start PEG 

feeds. dietary to recommend TF rate. 


4. Hypophosphatemia- resolved


5. hypokalemia-resolved


6. HTN- currently normotensive. not on medications at home. cont to monitor. 


7. PUD- no sings of bleeding. stress ulcer ppx. protonix po


8. DVT ppx- hep sq


9. Poor prognosis. palliative care on board. DNR/DNI, no central line or 

pressors. family desires pt to return to [facility]. not interested in Hospice at 

this time





Visit type





- Emergency Visit


Emergency Visit: Yes


ED Registration Date: 12/27/17


Care time: The patient presented to the Emergency Department on the above date 

and was hospitalized for further evaluation of their emergent condition.





- New Patient


This patient is new to me today: No





- Critical Care


Critical Care patient: No





- Discharge Referral


Referred to [facility] No

## 2018-01-13 NOTE — PN
Progress Note (short form)





- Note


Progress Note: 





Dr. Rangel covering for Dr. Jose who resumes care 1/15


Asked to evaluate PEG tube:


Placed yesterday 1/12


Abdomen non-distended, + normoactive BS.  PEG noted in LUQ with external bumper 

at the 2cm lizette at the abdominal wall.  some TTP at the peg site itself, 

rotating freely, flushing well.  No induration / erythema.  Some dried blood at 

the incision site was removed with 4 x 4 and betadine





OK to use PEG for feeds:


Keep head of bed elevated 35 degress at all times


Keep loose fitting abdominal binder in place to prevent tube dislodgement


Avoid excessive traction on PEG


No dressing between external bolster and abdominal wall


Flush tube with 30 cc water after feeds and meds


Bacitracin to PEG site BID for 5 days


Follow dietaician instructions regarding tube feed initiation and titration

## 2018-01-14 LAB
ALBUMIN SERPL-MCNC: 2.9 G/DL (ref 3.4–5)
ALP SERPL-CCNC: 106 U/L (ref 45–117)
ALT SERPL-CCNC: 42 U/L (ref 12–78)
ANION GAP SERPL CALC-SCNC: 7 MMOL/L (ref 8–16)
AST SERPL-CCNC: 21 U/L (ref 15–37)
BILIRUB SERPL-MCNC: 0.7 MG/DL (ref 0.2–1)
BUN SERPL-MCNC: 20 MG/DL (ref 7–18)
CALCIUM SERPL-MCNC: 8.6 MG/DL (ref 8.5–10.1)
CHLORIDE SERPL-SCNC: 99 MMOL/L (ref 98–107)
CO2 SERPL-SCNC: 30 MMOL/L (ref 21–32)
CREAT SERPL-MCNC: 0.4 MG/DL (ref 0.7–1.3)
DEPRECATED RDW RBC AUTO: 17.3 % (ref 11.9–15.9)
GLUCOSE SERPL-MCNC: 163 MG/DL (ref 74–106)
HCT VFR BLD CALC: 42.5 % (ref 35.4–49)
HGB BLD-MCNC: 13.1 GM/DL (ref 11.7–16.9)
MCH RBC QN AUTO: 28.5 PG (ref 25.7–33.7)
MCHC RBC AUTO-ENTMCNC: 30.8 G/DL (ref 32–35.9)
MCV RBC: 92.6 FL (ref 80–96)
PLATELET # BLD AUTO: 206 K/MM3 (ref 134–434)
PMV BLD: 8.7 FL (ref 7.5–11.1)
POTASSIUM SERPLBLD-SCNC: 3.7 MMOL/L (ref 3.5–5.1)
PROT SERPL-MCNC: 6.5 G/DL (ref 6.4–8.2)
RBC # BLD AUTO: 4.59 M/MM3 (ref 4–5.6)
SODIUM SERPL-SCNC: 136 MMOL/L (ref 136–145)
WBC # BLD AUTO: 18.1 K/MM3 (ref 4–10)

## 2018-01-14 RX ADMIN — PREDNISONE SCH MG: 10 TABLET ORAL at 10:19

## 2018-01-14 RX ADMIN — IPRATROPIUM BROMIDE AND ALBUTEROL SULFATE SCH AMP: .5; 3 SOLUTION RESPIRATORY (INHALATION) at 11:30

## 2018-01-14 RX ADMIN — Medication SCH ML: at 10:19

## 2018-01-14 RX ADMIN — IPRATROPIUM BROMIDE AND ALBUTEROL SULFATE SCH AMP: .5; 3 SOLUTION RESPIRATORY (INHALATION) at 16:22

## 2018-01-14 RX ADMIN — POLYETHYLENE GLYCOL 3350 SCH GM: 17 POWDER, FOR SOLUTION ORAL at 23:10

## 2018-01-14 RX ADMIN — PANTOPRAZOLE SODIUM SCH MG: 40 GRANULE, DELAYED RELEASE ORAL at 10:19

## 2018-01-14 RX ADMIN — CEFEPIME HYDROCHLORIDE SCH: 1 INJECTION, SOLUTION INTRAVENOUS at 15:50

## 2018-01-14 RX ADMIN — Medication SCH ML: at 17:32

## 2018-01-14 RX ADMIN — CEFEPIME HYDROCHLORIDE SCH MLS/HR: 1 INJECTION, SOLUTION INTRAVENOUS at 23:10

## 2018-01-14 RX ADMIN — IPRATROPIUM BROMIDE AND ALBUTEROL SULFATE SCH AMP: .5; 3 SOLUTION RESPIRATORY (INHALATION) at 20:43

## 2018-01-14 RX ADMIN — IPRATROPIUM BROMIDE AND ALBUTEROL SULFATE SCH AMP: .5; 3 SOLUTION RESPIRATORY (INHALATION) at 08:16

## 2018-01-14 NOTE — PN
Physical Exam: 


SUBJECTIVE: No acute events overnight. No new complaints. Pt has started tube 

feeds with Jevity over the weekend and has tolerated well.





OBJECTIVE:





 Vital Signs











 Period  Temp  Pulse  Resp  BP Sys/Penaloza  Pulse Ox


 


 Last 24 Hr  97.3 F-98.8 F  68-72  20-20  115-135/60-82  92-94











GENERAL: NAD, awake, alert, laying in bed, thin-appearing.


HEENT: No JVD, sclera anicteric, EOMI, moist mucosa


LUNGS: Coarse breath sounds anteriorly, slight rales noted. No wheezes. No 

accessory muscle use. Currently on 4LNC.  


HEART: RRR, S1, S2 with no murmur appreciated today


ABDOMEN: Soft, nontender, nondistended, normoactive bowel sounds, no guarding, 

no hepatomegaly. PEG noted under abdominal binder. site C/D/I


EXTREMITIES: 2+ DP pulses, warm, well-perfused, no edema b/l


NEUROLOGICAL: Normal speech, gait not observed.


SKIN: Warm, no rashes noted














Active Medications











Generic Name Dose Route Start Last Admin





  Trade Name Freq  PRN Reason Stop Dose Admin


 


Acetaminophen  1,000 mg  12/28/17 16:51  





  Ofirmev Injection -  IVPB   





  Q6H PRN   





  FEVER OR PAIN   


 


Albuterol Sulfate  1 amp  12/28/17 16:51  01/04/18 10:11





  Ventolin 0.083% Nebulizer Soln -  NEB   1 amp





  Q4H PRN   Administration





  SHORT OF BREATH/WHEEZING   


 


Albuterol/Ipratropium  1 amp  01/09/18 10:22  01/13/18 22:00





  Duoneb -  NEB   1 amp





  RQID [facility]   Administration


 


Amino Acids  30 ml  01/13/18 17:30  01/13/18 17:39





  Prosource No Carb Liquid Pkt  PEG   30 ml





  BID@0800,1730 [facility]   Administration


 


Cefepime HCl  1 gm in 50 mls @ 100 mls/hr  01/10/18 10:00  01/13/18 22:28





  Maxipime 1 Gm Premix Ivpb  IVPB   100 mls/hr





  Q12H [facility]   Administration


 


Pantoprazole Sodium  40 mg  01/13/18 16:15  





  Protonix Packets For Oral Suspension -  PEG   





  DAILY LIV   


 


Prednisone  30 mg  01/13/18 16:15  





  Deltasone -  PEG   





  DAILY LIV   


 


Scopolamine HBr  1 patch  01/07/18 12:30  01/13/18 13:48





  Transderm-Scop -  TD   1 patch





  Q3D [facility]   Administration











ASSESSMENT/PLAN:


87yo M with h/o COPD (baseline 3LNC at night), HTN, OA who was found to acute 

hypoxic hypercapnic distress. 


1) Acute hypoxic hypercapnic respiratory distress


   --2/2 to PNA most likely with component of COPD


   --Prednisone 30mg qDaily (day 1); continue to taper


   --One time dose Lasix 40mg IVP


   --Maintain SpO2 >88-90%


   --Pulmonology on board


   --Duoneb QID PRN


   --Nebs PRN q4h





2) Sepsis 2/2 to PNA


   --Cefepime day 7 today


   --Suspected on-going aspiration due to no improvement in breath sounds


      --s/p PEG tube 1/12; currently being used


   --Afebrile


   --Leukocytosis downtrending





3) HTN


   --Currently controlled


   --Continue BP monitoring





FEN:


   Fluids: None; free water with Jevity feeds


   Electrolyte abnormalities: None


   Nutrition: Jevity PEG feeds





PPX:


   DVT - Heparin SQ TID





Code Status: DNR/DNI, no pressors, no central lines


Dispo: Will want to go to [facility]; no interest in hospice


Case discussed with Dr. Johnny Davila, DO - Internal Medicine PGY-1








Visit type





- Emergency Visit


Emergency Visit: No





- New Patient


This patient is new to me today: No





- Critical Care


Critical Care patient: No

## 2018-01-14 NOTE — PN
Progress Note (short form)





- Note


Progress Note: 





Mr. Loomis complained of abdominal pain earlier


On exam earlier TTP suprapubic region


rectal exam was performed revealing copious amounts of soft light brown stool


 


Workman catheter was subsequently placed with improvement in suprapubic 

tenderness per nurse











suspected urinary retention contributing to pelvic pain


Continue to monitor


If pain persists, CT scan of the abdomen and pelvis


Bowel regimen: MiraLAX 17g BID via G-Tube





Dr. Jose resumes coverage 1/15

## 2018-01-14 NOTE — PN
Teaching Attending Note


Name of Resident: Earl Davila





ATTENDING PHYSICIAN STATEMENT





I saw and evaluated the patient.


I reviewed the resident's note and discussed the case with the resident.


I agree with the resident's findings and plan as documented.








SUBJECTIVE:upset that he is unable to eat. states breathing is ok. continues to 

have cough. denies CP, fever, chills, N/v/C/D








OBJECTIVE:


 Last Vital Signs











Temp Pulse Resp BP Pulse Ox


 


 97.3 F L  71   20   129/71   94 L


 


 01/14/18 06:03  01/14/18 06:03  01/14/18 06:03  01/14/18 06:03  01/13/18 21:00











General NAD


Lungs coarse breath sounds + crackles no wheezing


Abdomen soft NT/ND +abdominal binder. + PEG no surrounding erythema or drainage 

noted


Extremities no pedal edema


 





ASSESSMENT AND PLAN:


86 year old male with a PMHx of HTN, COPD (3L NC at night), Osteoarthritis, 

Dysphagia, PUD presented to the ER with acute hypoxic respiratory distress


1. Acute hypoxic hypercapnic respiratory distress- due to possible PNA vs acute 

on chronic COPD. continues to have crackles with IVF stopped yesterday. will 

give lasix 40mg IV x1. cont prednisone 30mg with slow taper (decrease by 10mg 

every 5days). s/p PEG placement 1/12. cont aspiration precautions. NPO until re-

evaluated by SLP. GI and pulmonary on board. saturating 88% on 3.5L NC. 

Supplemental oxygen to maintain spO2 >88%. 


2. Sepsis due to aspiration PNA- afebrile. leukocytosis trending down. on 

cefipime day 7. after completing course of vanco/zosyn. Flu and legonella 

negative. ID on board


3. Dysphagia- s/p PEG 1/12. tolerating TF. . dietary to recommend TF rate. 


4. Hypophosphatemia- resolved


5. hypokalemia-resolved


6. HTN- currently normotensive. not on medications at home. cont to monitor. 


7. PUD- no sings of bleeding. stress ulcer ppx. protonix po


8. DVT ppx- hep sq


9. Poor prognosis. palliative care on board. DNR/DNI, no central line or 

pressors. family desires pt to return to [facility]. not interested in Hospice at 

this time

## 2018-01-14 NOTE — PN
Progress Note (short form)





- Note


Progress Note: 





PULMONARY





Suprapubic distention today with more shortness of breath.





 Last Vital Signs











Temp Pulse Resp BP Pulse Ox


 


 97.3 F L  71   20   129/71   94 L


 


 01/14/18 06:03  01/14/18 06:03  01/14/18 06:03  01/14/18 06:03  01/13/18 21:00











Gen:  more tachypneic today


Heart: RRR


Lung: increased bilateral rhonchi 


Abd: soft, nontender


Ext: no edema





 CBC, BMP





 01/12/18 06:35 





 01/12/18 06:35 





Active Medications





Acetaminophen (Ofirmev Injection -)  1,000 mg IVPB Q6H PRN


   PRN Reason: FEVER OR PAIN


Albuterol Sulfate (Ventolin 0.083% Nebulizer Soln -)  1 amp NEB Q4H PRN


   PRN Reason: SHORT OF BREATH/WHEEZING


   Last Admin: 01/04/18 10:11 Dose:  1 amp


Albuterol/Ipratropium (Duoneb -)  1 amp NEB RQID [facility]


   Last Admin: 01/14/18 08:16 Dose:  1 amp


Amino Acids (Prosource No Carb Liquid Pkt)  30 ml PEG BID@0800,1730 [facility]


   Last Admin: 01/14/18 10:19 Dose:  30 ml


Cefepime HCl (Maxipime 1 Gm Premix Ivpb)  1 gm in 50 mls @ 100 mls/hr IVPB Q12H 

[facility]


   Last Admin: 01/13/18 22:28 Dose:  100 mls/hr


Pantoprazole Sodium (Protonix Packets For Oral Suspension -)  40 mg PEG DAILY 

[facility]


   Last Admin: 01/14/18 10:19 Dose:  40 mg


Prednisone (Deltasone -)  30 mg PEG DAILY [facility]


   Last Admin: 01/14/18 10:19 Dose:  30 mg


Scopolamine HBr (Transderm-Scop -)  1 patch TD Q3D [facility]


   Last Admin: 01/13/18 13:48 Dose:  1 patch











A/P


Acute COPD/Bronchiectasis Exacerbation


Acute on Chronic Hypoxic Respiratory Failure


r/o Pneumonia


Emphysema


+Troponins likely Demand Ischemia


HTN





-  place zhang catheter


-  antibiotics per ID


-  prednisone taper, but will give additional dose today


-  inhaled bronchodilators standing and PRN


-  O2 to keep SpO2 >90%


-  OOB to chair if possible


-  hold enteral feeds


-  rehab/physical therapy


-  DVT prophylaxis

## 2018-01-15 LAB
ANION GAP SERPL CALC-SCNC: 9 MMOL/L (ref 8–16)
BASOPHILS # BLD: 0.1 % (ref 0–2)
BUN SERPL-MCNC: 21 MG/DL (ref 7–18)
CALCIUM SERPL-MCNC: 9.3 MG/DL (ref 8.5–10.1)
CHLORIDE SERPL-SCNC: 96 MMOL/L (ref 98–107)
CO2 SERPL-SCNC: 34 MMOL/L (ref 21–32)
CREAT SERPL-MCNC: 0.3 MG/DL (ref 0.7–1.3)
DEPRECATED RDW RBC AUTO: 17.7 % (ref 11.9–15.9)
EOSINOPHIL # BLD: 0.1 % (ref 0–4.5)
GLUCOSE SERPL-MCNC: 103 MG/DL (ref 74–106)
HCT VFR BLD CALC: 43.1 % (ref 35.4–49)
HGB BLD-MCNC: 13.3 GM/DL (ref 11.7–16.9)
LYMPHOCYTES # BLD: 4 % (ref 8–40)
MAGNESIUM SERPL-MCNC: 2.3 MG/DL (ref 1.8–2.4)
MCH RBC QN AUTO: 28.7 PG (ref 25.7–33.7)
MCHC RBC AUTO-ENTMCNC: 30.9 G/DL (ref 32–35.9)
MCV RBC: 92.6 FL (ref 80–96)
MONOCYTES # BLD AUTO: 7.7 % (ref 3.8–10.2)
NEUTROPHILS # BLD: 88.1 % (ref 42.8–82.8)
PLATELET # BLD AUTO: 176 K/MM3 (ref 134–434)
PMV BLD: 8.9 FL (ref 7.5–11.1)
POTASSIUM SERPLBLD-SCNC: 3.6 MMOL/L (ref 3.5–5.1)
RBC # BLD AUTO: 4.65 M/MM3 (ref 4–5.6)
SODIUM SERPL-SCNC: 139 MMOL/L (ref 136–145)
WBC # BLD AUTO: 12.4 K/MM3 (ref 4–10)

## 2018-01-15 RX ADMIN — PREDNISONE SCH MG: 10 TABLET ORAL at 10:11

## 2018-01-15 RX ADMIN — CEFEPIME HYDROCHLORIDE SCH MLS/HR: 1 INJECTION, SOLUTION INTRAVENOUS at 11:14

## 2018-01-15 RX ADMIN — Medication SCH: at 10:10

## 2018-01-15 RX ADMIN — POLYETHYLENE GLYCOL 3350 SCH GM: 17 POWDER, FOR SOLUTION ORAL at 10:19

## 2018-01-15 RX ADMIN — IPRATROPIUM BROMIDE AND ALBUTEROL SULFATE SCH AMP: .5; 3 SOLUTION RESPIRATORY (INHALATION) at 08:25

## 2018-01-15 RX ADMIN — IPRATROPIUM BROMIDE AND ALBUTEROL SULFATE SCH AMP: .5; 3 SOLUTION RESPIRATORY (INHALATION) at 20:30

## 2018-01-15 RX ADMIN — POLYETHYLENE GLYCOL 3350 SCH GM: 17 POWDER, FOR SOLUTION ORAL at 21:49

## 2018-01-15 RX ADMIN — PANTOPRAZOLE SODIUM SCH MG: 40 GRANULE, DELAYED RELEASE ORAL at 10:11

## 2018-01-15 RX ADMIN — Medication SCH ML: at 17:33

## 2018-01-15 RX ADMIN — IPRATROPIUM BROMIDE AND ALBUTEROL SULFATE SCH AMP: .5; 3 SOLUTION RESPIRATORY (INHALATION) at 15:42

## 2018-01-15 RX ADMIN — IPRATROPIUM BROMIDE AND ALBUTEROL SULFATE SCH AMP: .5; 3 SOLUTION RESPIRATORY (INHALATION) at 11:19

## 2018-01-15 RX ADMIN — CEFEPIME HYDROCHLORIDE SCH MLS/HR: 1 INJECTION, SOLUTION INTRAVENOUS at 21:49

## 2018-01-15 NOTE — PN
Progress Note (short form)





- Note


Progress Note: 





c/o back pain. progressively worsening since being in the hospital. denies Cp, 

SOB, fever, chills, adbominal pain, N/v/C/D





 Current Medications











Generic Name Dose Route Start Last Admin





  Trade Name Freq  PRN Reason Stop Dose Admin


 


Acetaminophen  1,000 mg  12/28/17 16:51  01/15/18 10:11





  Ofirmev Injection -  IVPB   1,000 mg





  Q6H PRN   Administration





  FEVER OR PAIN   


 


Albuterol Sulfate  1 amp  12/28/17 16:51  01/04/18 10:11





  Ventolin 0.083% Nebulizer Soln -  NEB   1 amp





  Q4H PRN   Administration





  SHORT OF BREATH/WHEEZING   


 


Albuterol/Ipratropium  1 amp  01/09/18 10:22  01/15/18 08:25





  Duoneb -  NEB   1 amp





  RQID LIV   Administration


 


Amino Acids  30 ml  01/13/18 17:30  01/15/18 10:10





  Prosource No Carb Liquid Pkt  PEG   Not Given





  BID@0800,1730 LIV   


 


Cefepime HCl  1 gm in 50 mls @ 100 mls/hr  01/10/18 10:00  01/14/18 23:10





  Maxipime 1 Gm Premix Ivpb  IVPB   100 mls/hr





  Q12H LIV   Administration


 


Pantoprazole Sodium  40 mg  01/13/18 16:15  01/15/18 10:11





  Protonix Packets For Oral Suspension -  PEG   40 mg





  DAILY LIV   Administration


 


Polyethylene Glycol  17 gm  01/14/18 22:00  01/15/18 10:19





  Miralax (For Daily Use) -  NGT   17 gm





  BID LIV   Administration


 


Prednisone  30 mg  01/13/18 16:15  01/15/18 10:11





  Deltasone -  PEG   30 mg





  DAILY LIV   Administration


 


Scopolamine HBr  1 patch  01/07/18 12:30  01/13/18 13:48





  Transderm-Scop -  TD   1 patch





  Q3D LIV   Administration








 Last Vital Signs











Temp Pulse Resp BP Pulse Ox


 


 98.3 F   85   20   130/78   89 L


 


 01/15/18 06:51  01/15/18 06:51  01/15/18 06:51  01/15/18 06:51  01/14/18 21:00








General NAD


Lungs coarse breath sounds + crackles no wheezing


Abdomen soft NT/ND +abdominal binder. + PEG no surrounding erythema or drainage 

noted


Extremities no pedal edema


 


 CBCD











WBC  12.4 K/mm3 (4.0-10.0)  H D 01/15/18  06:20    


 


RBC  4.65 M/mm3 (4.00-5.60)   01/15/18  06:20    


 


Hgb  13.3 GM/dL (11.7-16.9)   01/15/18  06:20    


 


Hct  43.1 % (35.4-49)   01/15/18  06:20    


 


MCV  92.6 fl (80-96)   01/15/18  06:20    


 


MCHC  30.9 g/dl (32.0-35.9)  L  01/15/18  06:20    


 


RDW  17.7 % (11.9-15.9)  H  01/15/18  06:20    


 


Plt Count  176 K/MM3 (134-434)   01/15/18  06:20    


 


MPV  8.9 fl (7.5-11.1)   01/15/18  06:20    








 CMP











Sodium  139 mmol/L (136-145)   01/15/18  06:00    


 


Potassium  3.6 mmol/L (3.5-5.1)   01/15/18  06:00    


 


Chloride  96 mmol/L ()  L  01/15/18  06:00    


 


Carbon Dioxide  34 mmol/L (21-32)  H  01/15/18  06:00    


 


Anion Gap  9  (8-16)   01/15/18  06:00    


 


BUN  21 mg/dL (7-18)  H  01/15/18  06:00    


 


Creatinine  0.3 mg/dL (0.7-1.3)  L D 01/15/18  06:00    


 


Creat Clearance w eGFR  > 60  (>60)   01/14/18  12:35    


 


Calcium  9.3 mg/dL (8.5-10.1)   01/15/18  06:00    


 


Total Bilirubin  0.7 mg/dL (0.2-1.0)   01/14/18  12:35    


 


AST  21 U/L (15-37)  D 01/14/18  12:35    


 


ALT  42 U/L (12-78)  D 01/14/18  12:35    


 


Alkaline Phosphatase  106 U/L ()   01/14/18  12:35    


 


Total Protein  6.5 g/dl (6.4-8.2)   01/14/18  12:35    


 


Albumin  2.9 g/dl (3.4-5.0)  L D 01/14/18  12:35    











ASSESSMENT AND PLAN:


86 year old male with a PMHx of HTN, COPD (3L NC at night), Osteoarthritis, 

Dysphagia, PUD presented to the ER with acute hypoxic respiratory distress


1. Acute hypoxic hypercapnic respiratory distress- due to possible PNA vs acute 

on chronic COPD. continues to have crackles. will give lasix 40mg IV. monitor 

for improvement. saturating 91% on 3L NC.  cont prednisone 30mg with slow taper 

(decrease by 10mg every 5days). pulmonary on board. Supplemental oxygen to 

maintain spO2 >88%. 


2. Sepsis due to aspiration PNA- afebrile. slight bump in leukocytosis 

yesterday with concern continuing to aspirate. now trending down. on cefipime 

day 8. ID to determine abx duration. Flu and legonella negative. ID on board


3. Dysphagia- s/p PEG 1/12.  will attempt to put back on puree diet. see how 

tolerated


4. back pain- likely due to laying in bed. refused my exam or XR. "it will get 

better". tylenol as needed for pain


5. Hypophosphatemia- resolved


6. hypokalemia-resolved


7. HTN- currently normotensive. not on medications at home. cont to monitor. 


8. PUD- no sings of bleeding. stress ulcer ppx. protonix po


9. DVT ppx- hep sq


10. Poor prognosis. palliative care on board. DNR/DNI, no central line or 

pressors. family desires pt to return to [facility]. not interested in Hospice at 

this time. can liekly return tomorrow. if breathing status is improved. 








Visit type





- Emergency Visit


Emergency Visit: Yes


ED Registration Date: 12/27/17


Care time: The patient presented to the Emergency Department on the above date 

and was hospitalized for further evaluation of their emergent condition.





- New Patient


This patient is new to me today: No





- Critical Care


Critical Care patient: No





- Discharge Referral


Referred to [facility] No

## 2018-01-15 NOTE — PN
Progress Note (short form)





- Note


Progress Note: 


alert


moist cough unchanged


on antibiotics since 1/8


s/p PEG 1/12








 Vital Signs











 Period  Temp  Pulse  Resp  BP Sys/Penaloza  Pulse Ox


 


 Last 24 Hr  97.5 F-98.3 F    20-20  122-141/73-93  89








cor-rrr


lungs decreased bs at bases


abd soft,nt +GT


ext no edema





 





 CBC, BMP





 01/15/18 06:20 





 01/15/18 06:00 








 Microbiology





12/27/17 09:15   Blood - Peripheral Venous   Blood Culture - Final


                            NO GROWTH AFTER 5 DAYS INCUBATION


12/27/17 09:15   Blood - Peripheral Venous   Blood Culture - Final


                            NO GROWTH AFTER 5 DAYS INCUBATION


12/27/17 22:52   Urine - Urine Clean Catch   Urine Culture - Final


                            NO GROWTH OBTAINED


12/28/17 18:15   Urine For Antigen Detection   Legionella Antigen - Final


12/28/17 18:15   Urine For Antigen Detection   Streptococcus pneumoniae Antigen 

(M - Final


12/27/17 17:30   Nasopharyngeal Swab   Influenza Types A,B Antigen (JAISON) - Final


12/27/17 17:30   Nasopharyngeal Swab    - Final


cxray unchanged





a/p 


aspiration pneumonia- day #8 antibiotics, will d/c and observe


aspiration precautions


s/p peg placement


COPD


please call back if needed














Problem List





- Problems


(1) Pneumonia


Code(s): J18.9 - PNEUMONIA, UNSPECIFIED ORGANISM   


Qualifiers: 


   Pneumonia type: due to unspecified organism   Laterality: left   Lung 

location: lower lobe of lung   Qualified Code(s): J18.1 - Lobar pneumonia, 

unspecified organism   





(2) Respiratory failure


Code(s): J96.90 - RESPIRATORY FAILURE, UNSP, UNSP W HYPOXIA OR HYPERCAPNIA   





(3) COPD (chronic obstructive pulmonary disease)


Code(s): J44.9 - CHRONIC OBSTRUCTIVE PULMONARY DISEASE, UNSPECIFIED   


Qualifiers: 


   COPD type: COPD with acute lower respiratory infection   Qualified Code(s): 

J44.0 - Chronic obstructive pulmonary disease with acute lower respiratory 

infection

## 2018-01-15 NOTE — PN
Progress Note (short form)





- Note


Progress Note: 


Still with [place] / SOB, but better than yesterday. 


No CP. 


No acute events overnight. 








 Intake & Output











 01/12/18 01/13/18 01/14/18 01/15/18





 23:59 23:59 23:59 23:59


 


Intake Total 200 600 894 100


 


Output Total  200


 


Balance -400 -200 -756 -100


 


Weight 160 lb 1.6 oz 166 lb 165 lb 4 oz 161 lb








 Last Vital Signs











Temp Pulse Resp BP Pulse Ox


 


 98.3 F   85   20   130/78   89 L


 


 01/15/18 06:51  01/15/18 06:51  01/15/18 06:51  01/15/18 06:51  01/14/18 21:00








Active Medications





Acetaminophen (Ofirmev Injection -)  1,000 mg IVPB Q6H PRN


   PRN Reason: FEVER OR PAIN


   Last Admin: 01/15/18 10:11 Dose:  1,000 mg


Albuterol Sulfate (Ventolin 0.083% Nebulizer Soln -)  1 amp NEB Q4H PRN


   PRN Reason: SHORT OF BREATH/WHEEZING


   Last Admin: 01/04/18 10:11 Dose:  1 amp


Albuterol/Ipratropium (Duoneb -)  1 amp NEB RQID [facility]


   Last Admin: 01/15/18 11:19 Dose:  1 amp


Amino Acids (Prosource No Carb Liquid Pkt)  30 ml PEG BID@0800,1730 [facility]


   Last Admin: 01/15/18 10:10 Dose:  Not Given


Cefepime HCl (Maxipime 1 Gm Premix Ivpb)  1 gm in 50 mls @ 100 mls/hr IVPB Q12H 

[facility]


   Last Admin: 01/15/18 11:14 Dose:  100 mls/hr


Pantoprazole Sodium (Protonix Packets For Oral Suspension -)  40 mg PEG DAILY 

[facility]


   Last Admin: 01/15/18 10:11 Dose:  40 mg


Polyethylene Glycol (Miralax (For Daily Use) -)  17 gm NGT BID [facility]


   Last Admin: 01/15/18 10:19 Dose:  17 gm


Prednisone (Deltasone -)  30 mg PEG DAILY [facility]


   Last Admin: 01/15/18 10:11 Dose:  30 mg


Scopolamine HBr (Transderm-Scop -)  1 patch TD Q3D [facility]


   Last Admin: 01/13/18 13:48 Dose:  1 patch











Gen: Mildly tachypneic at rest 


Heart: RRR


Lung: bilateral rhonchi, no wheeze  


Abd: soft, nontender


Ext: no edema


 Laboratory Results - last 24 hr











  01/14/18 01/14/18 01/15/18





  12:35 12:35 06:00


 


WBC  18.1 H D  


 


RBC  4.59  


 


Hgb  13.1  D  


 


Hct  42.5  


 


MCV  92.6  


 


MCH  28.5  


 


MCHC  30.8 L  


 


RDW  17.3 H  


 


Plt Count  206  


 


MPV  8.7  


 


Neutrophils %   


 


Lymphocytes %   


 


Monocytes %   


 


Eosinophils %   


 


Basophils %   


 


Sodium   136  139


 


Potassium   3.7  3.6


 


Chloride   99  96 L


 


Carbon Dioxide   30  34 H


 


Anion Gap   7 L  9


 


BUN   20 H  21 H


 


Creatinine   0.4 L D  0.3 L D


 


Creat Clearance w eGFR   > 60 


 


Random Glucose   163 H D  103  D


 


Calcium   8.6  9.3


 


Magnesium    2.3


 


Total Bilirubin   0.7 


 


AST   21  D 


 


ALT   42  D 


 


Alkaline Phosphatase   106 


 


Total Protein   6.5 


 


Albumin   2.9 L D 














  01/15/18





  06:20


 


WBC  12.4 H D


 


RBC  4.65


 


Hgb  13.3


 


Hct  43.1


 


MCV  92.6


 


MCH  28.7


 


MCHC  30.9 L


 


RDW  17.7 H


 


Plt Count  176


 


MPV  8.9


 


Neutrophils %  88.1 H


 


Lymphocytes %  4.0 L D


 


Monocytes %  7.7


 


Eosinophils %  0.1  D


 


Basophils %  0.1


 


Sodium 


 


Potassium 


 


Chloride 


 


Carbon Dioxide 


 


Anion Gap 


 


BUN 


 


Creatinine 


 


Creat Clearance w eGFR 


 


Random Glucose 


 


Calcium 


 


Magnesium 


 


Total Bilirubin 


 


AST 


 


ALT 


 


Alkaline Phosphatase 


 


Total Protein 


 


Albumin 

















A/P


Acute COPD/Bronchiectasis Exacerbation


Acute on Chronic Hypoxic Respiratory Failure


r/o Pneumonia


Emphysema


+Troponins likely Demand Ischemia


HTN





-  antibiotics per ID


-  prednisone taper 


-  inhaled bronchodilators standing and PRN


-  O2 to keep SpO2 >90%


-  OOB to chair if possible


-  rehab/physical therapy


-  DVT prophylaxis


-  DNR/DNI 





Dr Miner

## 2018-01-15 NOTE — PN
Progress Note, Physician


History of Present Illness: 





No events. Awake and alert. Mild pain at PEG. Skin intact, no leaks, bleeding








- Current Medication List


Current Medications: 


Active Medications





Acetaminophen (Ofirmev Injection -)  1,000 mg IVPB Q6H PRN


   PRN Reason: FEVER OR PAIN


   Last Admin: 01/15/18 10:11 Dose:  1,000 mg


Albuterol Sulfate (Ventolin 0.083% Nebulizer Soln -)  1 amp NEB Q4H PRN


   PRN Reason: SHORT OF BREATH/WHEEZING


   Last Admin: 01/04/18 10:11 Dose:  1 amp


Albuterol/Ipratropium (Duoneb -)  1 amp NEB RQID [facility]


   Last Admin: 01/15/18 11:19 Dose:  1 amp


Amino Acids (Prosource No Carb Liquid Pkt)  30 ml PEG BID@0800,1730 [facility]


   Last Admin: 01/15/18 10:10 Dose:  Not Given


Cefepime HCl (Maxipime 1 Gm Premix Ivpb)  1 gm in 50 mls @ 100 mls/hr IVPB Q12H 

[facility]


   Last Admin: 01/15/18 11:14 Dose:  100 mls/hr


Pantoprazole Sodium (Protonix Packets For Oral Suspension -)  40 mg PEG DAILY 

[facility]


   Last Admin: 01/15/18 10:11 Dose:  40 mg


Polyethylene Glycol (Miralax (For Daily Use) -)  17 gm NGT BID [facility]


   Last Admin: 01/15/18 10:19 Dose:  17 gm


Prednisone (Deltasone -)  30 mg PEG DAILY [facility]


   Last Admin: 01/15/18 10:11 Dose:  30 mg


Scopolamine HBr (Transderm-Scop -)  1 patch TD Q3D [facility]


   Last Admin: 01/13/18 13:48 Dose:  1 patch











- Objective


Vital Signs: 


 Vital Signs











Temperature  98.3 F   01/15/18 06:51


 


Pulse Rate  85   01/15/18 06:51


 


Respiratory Rate  20   01/15/18 06:51


 


Blood Pressure  130/78   01/15/18 06:51


 


O2 Sat by Pulse Oximetry (%)  89 L  01/14/18 21:00











Constitutional: Yes: No Distress, Calm


Eyes: Yes: Conjunctiva Clear


HENT: Yes: Atraumatic


Gastrointestinal: Yes: Soft, Tenderness (at PEG, mild)


Neurological: Yes: Alert


Labs: 


 CBC, BMP





 01/15/18 06:20 





 01/15/18 06:00 





 INR, PTT











INR  0.96  (0.82-1.09)   01/12/18  06:35    








 Laboratory Results - last 24 hr











  01/15/18 01/15/18





  06:00 06:20


 


WBC   12.4 H D


 


RBC   4.65


 


Hgb   13.3


 


Hct   43.1


 


MCV   92.6


 


MCH   28.7


 


MCHC   30.9 L


 


RDW   17.7 H


 


Plt Count   176


 


MPV   8.9


 


Neutrophils %   88.1 H


 


Lymphocytes %   4.0 L D


 


Monocytes %   7.7


 


Eosinophils %   0.1  D


 


Basophils %   0.1


 


Sodium  139 


 


Potassium  3.6 


 


Chloride  96 L 


 


Carbon Dioxide  34 H 


 


Anion Gap  9 


 


BUN  21 H 


 


Creatinine  0.3 L D 


 


Random Glucose  103  D 


 


Calcium  9.3 


 


Magnesium  2.3 














Problem List





- Problems


(1) Aspiration into airway


Code(s): T17.908A - UNSP FB IN RESP TRACT, PART UNSP CAUSING OTH INJURY, INIT   





(2) Inadequate oral nutritional intake


Code(s): R63.8 - OTHER SYMPTOMS AND SIGNS CONCERNING FOOD AND FLUID INTAKE   





Assessment/Plan











Keep bumper at 4-5.


PEG care and aspiration precautions as per protocol


PO/PEG nutrition as tolerated. 


PEG hydration

## 2018-01-16 RX ADMIN — POLYETHYLENE GLYCOL 3350 SCH: 17 POWDER, FOR SOLUTION ORAL at 22:20

## 2018-01-16 RX ADMIN — Medication SCH: at 20:16

## 2018-01-16 RX ADMIN — IPRATROPIUM BROMIDE AND ALBUTEROL SULFATE SCH AMP: .5; 3 SOLUTION RESPIRATORY (INHALATION) at 12:50

## 2018-01-16 RX ADMIN — Medication SCH ML: at 10:00

## 2018-01-16 RX ADMIN — SCOPALAMINE SCH PATCH: 1 PATCH, EXTENDED RELEASE TRANSDERMAL at 10:00

## 2018-01-16 RX ADMIN — SCOPALAMINE SCH: 1 PATCH, EXTENDED RELEASE TRANSDERMAL at 20:16

## 2018-01-16 RX ADMIN — CEFEPIME HYDROCHLORIDE SCH MLS/HR: 1 INJECTION, SOLUTION INTRAVENOUS at 10:00

## 2018-01-16 RX ADMIN — IPRATROPIUM BROMIDE AND ALBUTEROL SULFATE SCH AMP: .5; 3 SOLUTION RESPIRATORY (INHALATION) at 20:00

## 2018-01-16 RX ADMIN — IPRATROPIUM BROMIDE AND ALBUTEROL SULFATE SCH AMP: .5; 3 SOLUTION RESPIRATORY (INHALATION) at 16:00

## 2018-01-16 RX ADMIN — IPRATROPIUM BROMIDE AND ALBUTEROL SULFATE SCH AMP: .5; 3 SOLUTION RESPIRATORY (INHALATION) at 10:00

## 2018-01-16 RX ADMIN — PANTOPRAZOLE SODIUM SCH MG: 40 GRANULE, DELAYED RELEASE ORAL at 10:00

## 2018-01-16 RX ADMIN — IPRATROPIUM BROMIDE AND ALBUTEROL SULFATE SCH AMP: .5; 3 SOLUTION RESPIRATORY (INHALATION) at 08:42

## 2018-01-16 RX ADMIN — PREDNISONE SCH MG: 10 TABLET ORAL at 10:00

## 2018-01-16 RX ADMIN — POLYETHYLENE GLYCOL 3350 SCH: 17 POWDER, FOR SOLUTION ORAL at 10:00

## 2018-01-16 NOTE — PN
Physical Exam: 


SUBJECTIVE: Patient seen and examined. Says he feels better today except for 

RLQ abdominal pain that started a few days ago. He says his last bowel movement 

was 3 days ago. Denies worsening SOB, dizziness, chest pain, nausea and 

vomiting. 








OBJECTIVE:





 Vital Signs











 Period  Temp  Pulse  Resp  BP Sys/Penaloza  Pulse Ox


 


 Last 24 Hr  97.4 F-98.1 F    18-20  119-141/66-87  90











GENERAL: Laying in bed, in no acute distress


HEAD: Normal with no signs of trauma.


EYES: EOMI, PERRLA


ENT: Dry mucous membranes


LUNGS: course breath sounds throughout, mild use of accessory muscle


HEART: regular rate and rhythm, no murmurs appreciated


ABDOMEN: RLQ tenderness to palpation, +BS, Peg tube: no drainage, erythema.


EXTREMITIES: 2+ pulses, warm, well-perfused, no edema. 


SKIN: Warm, dry, normal turgor, no rashes or lesions noted

















Active Medications











Generic Name Dose Route Start Last Admin





  Trade Name Freq  PRN Reason Stop Dose Admin


 


Acetaminophen  1,000 mg  12/28/17 16:51  01/15/18 10:11





  Ofirmev Injection -  IVPB   1,000 mg





  Q6H PRN   Administration





  FEVER OR PAIN   


 


Albuterol Sulfate  1 amp  12/28/17 16:51  01/04/18 10:11





  Ventolin 0.083% Nebulizer Soln -  NEB   1 amp





  Q4H PRN   Administration





  SHORT OF BREATH/WHEEZING   


 


Albuterol/Ipratropium  1 amp  01/09/18 10:22  01/16/18 16:00





  Duoneb -  NEB   1 amp





  RQID LIV   Administration


 


Amino Acids  30 ml  01/13/18 17:30  01/16/18 10:00





  Prosource No Carb Liquid Pkt  PEG   30 ml





  BID@0800,1730 LIV   Administration


 


Pantoprazole Sodium  40 mg  01/13/18 16:15  01/16/18 10:00





  Protonix Packets For Oral Suspension -  PEG   40 mg





  DAILY LIV   Administration


 


Polyethylene Glycol  17 gm  01/14/18 22:00  01/16/18 10:00





  Miralax (For Daily Use) -  NGT   Not Given





  BID LIV   


 


Prednisone  30 mg  01/13/18 16:15  01/16/18 10:00





  Deltasone -  PEG   30 mg





  DAILY LIV   Administration


 


Scopolamine HBr  1 patch  01/07/18 12:30  01/16/18 10:00





  Transderm-Scop -  TD   1 patch





  Q3D LIV   Administration











ASSESSMENT/PLAN:








87yo M with h/o COPD (baseline 3LNC at night), HTN, OA who was found to acute 

hypoxic hypercapnic distress. 





#Acute hypoxic hypercapnic respiratory distress


-2/2 to PNA most likely with component of COPD


-Prednisone 30mg qDaily (day 3); continue to taper


-Maintain SpO2 >88-90%


-Pulmonology on board


-Duoneb QID PRN


-Nebs PRN q4h





#Sepsis 2/2 to PNA


-Cefepime day 8 today


-Suspected on-going aspiration due to no improvement in breath sounds


-s/p PEG tube 1/12; currently being used


-Aspiration precautions


-Afebrile


-Leukocytosis downtrending





#UTI


-Day 2 Abx: Ceftriaxone


-pending urine cultures





#Abdominal Pain


-resolved





#HTN


-Currently controlled


-Continue BP monitoring





#FEN:


Fluids: None; free water with Jevity feeds


Electrolyte abnormalities: None


Nutrition: 


 Dysphagia puree diet with NO PO fluids


initiate nocturnal feedings with TF Jevity 1.5 @ 40 ml/10 hr advance by 10 ml q 

day to goal of 60 ml/10 hrs (8 pm to 6 am) 


  (900 kcal/38 gm Prot/456 ml free water in 600 ml volume)








#PPX:


DVT - Heparin SQ TID








Dispo:





Will discharge once moved from IV ABx to PO. 


Poor prognosis





Visit type





- Emergency Visit


Emergency Visit: Yes


ED Registration Date: 12/27/17


Care time: The patient presented to the Emergency Department on the above date 

and was hospitalized for further evaluation of their emergent condition.





- New Patient


This patient is new to me today: Yes


Date on this admission: 01/16/18





- Critical Care


Critical Care patient: No

## 2018-01-16 NOTE — PN
Progress Note (short form)





- Note


Progress Note: 





PULMONAR





VSS/AFEBRILE





PALE/ANICTERIC


BIBASILAR DIMINISHED BREATH SOUNDS/SCATTERED RHONCHI


S1S2


BS+/PEG


HEEL OFF- NO EDEMA





LABS/IMAGES/MEDS/NOTES REVIEWED





Hypoxemic respiratory failure


Multilobar PNA/ aspiration 


Fluid overload 


Acute COPD exacerbation  


HTN





- peg in place


-ABX  discontinued as per ID 


-BD TX 


- prednisone to taper


-heparin SubQ


-protonix for GI ppx 


-O2 to maintain saturation 


-DNR/DNI


-Aspiration precautions


 





BRIAN TRAN MD 














Problem List





- Problems


(1) COPD (chronic obstructive pulmonary disease)


Code(s): J44.9 - CHRONIC OBSTRUCTIVE PULMONARY DISEASE, UNSPECIFIED   


Qualifiers: 


   COPD type: COPD with acute lower respiratory infection   Qualified Code(s): 

J44.0 - Chronic obstructive pulmonary disease with acute lower respiratory 

infection   





(2) Pneumonia


Code(s): J18.9 - PNEUMONIA, UNSPECIFIED ORGANISM   


Qualifiers: 


   Pneumonia type: due to unspecified organism   Laterality: left   Lung 

location: lower lobe of lung   Qualified Code(s): J18.1 - Lobar pneumonia, 

unspecified organism   





(3) Anemia


Code(s): D64.9 - ANEMIA, UNSPECIFIED   





(4) Sepsis


Code(s): A41.9 - SEPSIS, UNSPECIFIED ORGANISM   


Qualifiers: 


   Sepsis type: sepsis due to unspecified organism   Qualified Code(s): A41.9 - 

Sepsis, unspecified organism   





(5) Hypertension


Code(s): I10 - ESSENTIAL (PRIMARY) HYPERTENSION   





(6) Chronic steroid use


Code(s): [id number] -

## 2018-01-16 NOTE — PN
Teaching Attending Note


Name of Resident: Radha Ryder





ATTENDING PHYSICIAN STATEMENT


Time of evaluation: 10:10 AM


I saw and evaluated the patient.


I reviewed the resident's note and discussed the case with the resident.


I agree with the resident's findings and plan as documented.








SUBJECTIVE:


Patient seen and examined. reports some abdominal and back pain, says is '

chronic'. no urinary or bowel symptoms. Denies any new dyspnea, otherwise no 

new complaints. 





OBJECTIVE:


 Vital Signs











 Period  Temp  Pulse  Resp  BP Sys/Penaloza  Pulse Ox


 


 Last 24 Hr  97.4 F-98.1 F    18-20  119-136/66-87  90








 Intake & Output











 01/13/18 01/14/18 01/15/18 01/16/18





 23:59 23:59 23:59 23:59


 


Intake Total 600 894 150 150


 


Output Total 800 1650 1400 100


 


Balance -200 -756 -1250 50


 


Weight 166 lb 165 lb 4 oz 161 lb 








General: lying in bed, mild use of acessory muscles of respiration


Chest: right sided rhonchi, right basilar rales


Abdomen: soft, tenderness in RLQ/LLQ, no voluntary or involuntary guarding or 

rigidity, positive bowel sounds, no suprapubic tenderness


Extremities: no edema





 Home Medication List











 Medication  Instructions  Recorded  Confirmed  Type


 


Acetaminophen [Tylenol] 2 tab PO Q6H PRN 12/27/17 12/27/17 History


 


Albuterol 2.5/Ipratropium 0.5 1 amp NEB TID 12/27/17 12/27/17 History





[Duoneb -]    


 


Budesonide [Pulmicort 0.5 mg 1 neb PO BID 12/27/17 12/27/17 History





Nebulizer -]    


 


Ipratropium 0.02% Nebulizer 1 amp NEB Q6H PRN 12/27/17 12/27/17 History





[Atrovent 0.02% Nebulizer -]    


 


Menthol/Phenol [Cepastat Lozenge -] 1 each MM QID PRN 12/27/17 12/27/17 History


 


Metoclopramide HCl 10 mg PO TID 12/27/17 12/27/17 History


 


Prednisone 10 mg PO BID 12/27/17 12/27/17 History


 


Tramadol HCl [Ultram] 2 mg PO DAILY 12/27/17 12/27/17 History








 Active Medications











Generic Name Dose Route Start Last Admin





  Trade Name Freq  PRN Reason Stop Dose Admin


 


Acetaminophen  1,000 mg  12/28/17 16:51  01/15/18 10:11





  Ofirmev Injection -  IVPB   1,000 mg





  Q6H PRN   Administration





  FEVER OR PAIN   


 


Albuterol Sulfate  1 amp  12/28/17 16:51  01/04/18 10:11





  Ventolin 0.083% Nebulizer Soln -  NEB   1 amp





  Q4H PRN   Administration





  SHORT OF BREATH/WHEEZING   


 


Albuterol/Ipratropium  1 amp  01/09/18 10:22  01/16/18 12:50





  Duoneb -  NEB   1 amp





  RQID LIV   Administration


 


Amino Acids  30 ml  01/13/18 17:30  01/16/18 10:00





  Prosource No Carb Liquid Pkt  PEG   30 ml





  BID@0800,1730 LIV   Administration


 


Pantoprazole Sodium  40 mg  01/13/18 16:15  01/16/18 10:00





  Protonix Packets For Oral Suspension -  PEG   40 mg





  DAILY LIV   Administration


 


Polyethylene Glycol  17 gm  01/14/18 22:00  01/16/18 10:00





  Miralax (For Daily Use) -  NGT   Not Given





  BID LIV   


 


Prednisone  30 mg  01/13/18 16:15  01/16/18 10:00





  Deltasone -  PEG   30 mg





  DAILY LIV   Administration


 


Scopolamine HBr  1 patch  01/07/18 12:30  01/16/18 10:00





  Transderm-Scop -  TD   1 patch





  Q3D LIV   Administration











 Microbiology





12/27/17 09:15   Blood - Peripheral Venous   Blood Culture - Final


                            NO GROWTH AFTER 5 DAYS INCUBATION


12/27/17 09:15   Blood - Peripheral Venous   Blood Culture - Final


                            NO GROWTH AFTER 5 DAYS INCUBATION


12/27/17 22:52   Urine - Urine Clean Catch   Urine Culture - Final


                            NO GROWTH OBTAINED


12/28/17 18:15   Urine For Antigen Detection   Legionella Antigen - Final


12/28/17 18:15   Urine For Antigen Detection   Streptococcus pneumoniae Antigen 

(M - Final


12/27/17 17:30   Nasopharyngeal Swab   Influenza Types A,B Antigen (JAISON) - Final


12/27/17 17:30   Nasopharyngeal Swab    - Final











ASSESSMENT AND PLAN:


86 year old male with a PMHx of HTN, COPD (3L NC at night), Osteoarthritis, 

Dysphagia, PUD presented to the ER with acute hypoxic respiratory distress





-acute hypoxic/hypercapneic respiratory distress, suspect aspiration PNA, acute 

on chronic COPD with mild diastolic HF.


-Sepsis due to aspiration PNA


-Dysphagia


-leucocytosis, suspect ongoing aspriation


-Back pain


-Abdominal pain


-hypokalemia


-Hypophosphatemia


-HTN


-PUD





plan;


s/p 8 days of cefepime, off antibiotics. WBC improved. Suspect ongoing 

aspiration. 


Aspiration precautions (patient no compliant with HOB elevation). 


Check CT A/p given a vague abdominal symptoms. 


Speech/swallow/nutrition input noted. Start nocturnal tube feeds pending CT A/P

, calorie count. s/p PEG 1/12.


Hold off additional lasix as looks clinically dry, respiratory symptoms 

predominantly suspect from ongoing aspiration. 


Slow prednisone taper. 


replete lytes prn. 


GI/DVTPPX


Poor prognosis, DNR/DNI, no central line or pressors. Per prior discussion, not 

interested in hospice at this time, want patient to return to [facility], [facility], 

patient with ongoing respiratory symptoms, with minimal activity or 

conversation. 


Dispo planning on hold pending abdominal w/u as above and as respiratory 

symptoms stable.

## 2018-01-17 LAB
ALBUMIN SERPL-MCNC: 2.6 G/DL (ref 3.4–5)
ALP SERPL-CCNC: 93 U/L (ref 45–117)
ALT SERPL-CCNC: 27 U/L (ref 12–78)
ANION GAP SERPL CALC-SCNC: 6 MMOL/L (ref 8–16)
AST SERPL-CCNC: 14 U/L (ref 15–37)
BILIRUB SERPL-MCNC: 0.4 MG/DL (ref 0.2–1)
BUN SERPL-MCNC: 23 MG/DL (ref 7–18)
CALCIUM SERPL-MCNC: 9 MG/DL (ref 8.5–10.1)
CHLORIDE SERPL-SCNC: 99 MMOL/L (ref 98–107)
CO2 SERPL-SCNC: 37 MMOL/L (ref 21–32)
CREAT SERPL-MCNC: 0.4 MG/DL (ref 0.7–1.3)
DEPRECATED RDW RBC AUTO: 17.3 % (ref 11.9–15.9)
GLUCOSE SERPL-MCNC: 134 MG/DL (ref 74–106)
HCT VFR BLD CALC: 41.7 % (ref 35.4–49)
HGB BLD-MCNC: 12.9 GM/DL (ref 11.7–16.9)
MCH RBC QN AUTO: 28.7 PG (ref 25.7–33.7)
MCHC RBC AUTO-ENTMCNC: 31 G/DL (ref 32–35.9)
MCV RBC: 92.7 FL (ref 80–96)
PLATELET # BLD AUTO: 133 K/MM3 (ref 134–434)
PMV BLD: 9.1 FL (ref 7.5–11.1)
POTASSIUM SERPLBLD-SCNC: 3.7 MMOL/L (ref 3.5–5.1)
PROT SERPL-MCNC: 6.1 G/DL (ref 6.4–8.2)
RBC # BLD AUTO: 4.5 M/MM3 (ref 4–5.6)
SODIUM SERPL-SCNC: 142 MMOL/L (ref 136–145)
WBC # BLD AUTO: 11 K/MM3 (ref 4–10)

## 2018-01-17 RX ADMIN — Medication SCH ML: at 18:15

## 2018-01-17 RX ADMIN — HEPARIN SODIUM SCH UNIT: 5000 INJECTION, SOLUTION INTRAVENOUS; SUBCUTANEOUS at 22:06

## 2018-01-17 RX ADMIN — PREDNISONE SCH MG: 10 TABLET ORAL at 10:35

## 2018-01-17 RX ADMIN — PANTOPRAZOLE SODIUM SCH MG: 40 GRANULE, DELAYED RELEASE ORAL at 10:35

## 2018-01-17 RX ADMIN — IPRATROPIUM BROMIDE AND ALBUTEROL SULFATE SCH AMP: .5; 3 SOLUTION RESPIRATORY (INHALATION) at 08:55

## 2018-01-17 RX ADMIN — IPRATROPIUM BROMIDE AND ALBUTEROL SULFATE SCH AMP: .5; 3 SOLUTION RESPIRATORY (INHALATION) at 21:45

## 2018-01-17 RX ADMIN — IPRATROPIUM BROMIDE AND ALBUTEROL SULFATE SCH AMP: .5; 3 SOLUTION RESPIRATORY (INHALATION) at 11:48

## 2018-01-17 RX ADMIN — Medication SCH ML: at 10:35

## 2018-01-17 RX ADMIN — IPRATROPIUM BROMIDE AND ALBUTEROL SULFATE SCH AMP: .5; 3 SOLUTION RESPIRATORY (INHALATION) at 16:47

## 2018-01-17 RX ADMIN — POLYETHYLENE GLYCOL 3350 SCH GM: 17 POWDER, FOR SOLUTION ORAL at 22:06

## 2018-01-17 RX ADMIN — POLYETHYLENE GLYCOL 3350 SCH GM: 17 POWDER, FOR SOLUTION ORAL at 10:35

## 2018-01-17 NOTE — PN
Teaching Attending Note


Name of Resident: Radha Ryder





ATTENDING PHYSICIAN STATEMENT


Time of evaluation: 11:20 AM


I saw and evaluated the patient.


I reviewed the resident's note and discussed the case with the resident.


I agree with the resident's findings and plan as documented.








SUBJECTIVE:


Patient seen and examined. No complaints. Denies any new dyspnea. 





OBJECTIVE:


 Vital Signs











 Period  Temp  Pulse  Resp  BP Sys/Penaloza  Pulse Ox


 


 Last 24 Hr  97.9 F-98.3 F    20-20  116-136/65-91  85-94








 Intake & Output











 01/14/18 01/15/18 01/16/18 01/17/18





 23:59 23:59 23:59 23:59


 


Intake Total 894 150 150 715


 


Output Total 1650 1400 300 


 


Balance -756 -1250 -150 715


 


Weight 165 lb 4 oz 161 lb  








General: lying in bed, audible gurgling, tachypneic, mild use of accessory 

muscles of respiration


CVS:S1s2 regular


Chest: bilateral coarse rhonchi, few scattered rales


Abdomen: soft, NT throughout, positive bowel sounds


extremities: no edema





 Home Medication List











 Medication  Instructions  Recorded  Confirmed  Type


 


Acetaminophen [Tylenol] 2 tab PO Q6H PRN 12/27/17 12/27/17 History


 


Albuterol 2.5/Ipratropium 0.5 1 amp NEB TID 12/27/17 12/27/17 History





[Duoneb -]    


 


Budesonide [Pulmicort 0.5 mg 1 neb PO BID 12/27/17 12/27/17 History





Nebulizer -]    


 


Ipratropium 0.02% Nebulizer 1 amp NEB Q6H PRN 12/27/17 12/27/17 History





[Atrovent 0.02% Nebulizer -]    


 


Menthol/Phenol [Cepastat Lozenge -] 1 each MM QID PRN 12/27/17 12/27/17 History


 


Metoclopramide HCl 10 mg PO TID 12/27/17 12/27/17 History


 


Prednisone 10 mg PO BID 12/27/17 12/27/17 History


 


Tramadol HCl [Ultram] 2 mg PO DAILY 12/27/17 12/27/17 History








 Active Medications











Generic Name Dose Route Start Last Admin





  Trade Name Freq  PRN Reason Stop Dose Admin


 


Acetaminophen  1,000 mg  12/28/17 16:51  01/15/18 10:11





  Ofirmev Injection -  IVPB   1,000 mg





  Q6H PRN   Administration





  FEVER OR PAIN   


 


Albuterol Sulfate  1 amp  12/28/17 16:51  01/04/18 10:11





  Ventolin 0.083% Nebulizer Soln -  NEB   1 amp





  Q4H PRN   Administration





  SHORT OF BREATH/WHEEZING   


 


Albuterol/Ipratropium  1 amp  01/09/18 10:22  01/17/18 16:47





  Duoneb -  NEB   1 amp





  RQID LIV   Administration


 


Amino Acids  30 ml  01/13/18 17:30  01/17/18 10:35





  Prosource No Carb Liquid Pkt  PEG   30 ml





  BID@0800,1730 LIV   Administration


 


Pantoprazole Sodium  40 mg  01/13/18 16:15  01/17/18 10:35





  Protonix Packets For Oral Suspension -  PEG   40 mg





  DAILY LIV   Administration


 


Polyethylene Glycol  17 gm  01/14/18 22:00  01/17/18 10:35





  Miralax (For Daily Use) -  NGT   17 gm





  BID LIV   Administration


 


Prednisone  20 mg  01/18/18 10:00  





  Deltasone -  PEG   





  DAILY LIV   


 


Scopolamine HBr  1 patch  01/07/18 12:30  01/16/18 20:16





  Transderm-Scop -  TD   Not Given





  Q3D LIV   








 Laboratory Results - last 24 hr











  01/17/18 01/17/18





  06:00 06:00


 


WBC  11.0 H 


 


RBC  4.50 


 


Hgb  12.9 


 


Hct  41.7 


 


MCV  92.7 


 


MCH  28.7 


 


MCHC  31.0 L 


 


RDW  17.3 H 


 


Plt Count  133 L D 


 


MPV  9.1 


 


Sodium   142


 


Potassium   3.7


 


Chloride   99


 


Carbon Dioxide   37 H


 


Anion Gap   6 L


 


BUN   23 H


 


Creatinine   0.4 L D


 


Creat Clearance w eGFR   > 60


 


Random Glucose   134 H D


 


Calcium   9.0


 


Total Bilirubin   0.4  D


 


AST   14 L D


 


ALT   27  D


 


Alkaline Phosphatase   93


 


Total Protein   6.1 L


 


Albumin   2.6 L








 Microbiology





12/27/17 09:15   Blood - Peripheral Venous   Blood Culture - Final


                            NO GROWTH AFTER 5 DAYS INCUBATION


12/27/17 09:15   Blood - Peripheral Venous   Blood Culture - Final


                            NO GROWTH AFTER 5 DAYS INCUBATION


12/27/17 22:52   Urine - Urine Clean Catch   Urine Culture - Final


                            NO GROWTH OBTAINED


12/28/17 18:15   Urine For Antigen Detection   Legionella Antigen - Final


12/28/17 18:15   Urine For Antigen Detection   Streptococcus pneumoniae Antigen 

(M - Final


12/27/17 17:30   Nasopharyngeal Swab   Influenza Types A,B Antigen (JAISON) - Final


12/27/17 17:30   Nasopharyngeal Swab    - Final











ASSESSMENT AND PLAN:


86 year old male with a PMHx of HTN, COPD (3L NC at night), Osteoarthritis, 

Dysphagia, PUD presented to the ER with acute hypoxic respiratory distress





-acute hypoxic/hypercapneic respiratory distress, suspect aspiration PNA, acute 

on chronic COPD with mild diastolic HF.


-Sepsis due to aspiration PNA


-Dysphagia


-leucocytosis, suspect ongoing aspriation


-Back pain


-Abdominal pain


-hypokalemia


-Hypophosphatemia


-HTN


-PUD





plan;


s/p 8 days of cefepime, off antibiotics. WBC improved. Suspect ongoing 

aspiration. 


Aspiration precautions (patient no compliant with HOB elevation). 


Abdominal symptoms resolved after BM. No new concerns.


Patient with worsening rhonchi and gurgling today, 


had received trial with tube feeds over the weekend, held given increased 

aspiration concerns. 


Will need to hold tube feeds for now. 


Dysphagia pureed diet. 


Reconsult palliative care to address goals of care as patient wtih ongoing 

aspiration and tenuous respiratory status. 


Oxygen saturations 80s today that improved later in low 90s. 


Hold off additional lasix as looks clinically dry, respiratory symptoms 

predominantly suspect from ongoing aspiration. 


Slow prednisone taper. 


replete lytes prn. 


GI/DVTPPX


Poor prognosis, DNR/DNI, no central line or pressors. Discussed with palliative 

care, will re-address goals of care with HCP as patient with ongoing aspiration

, tenuous respiratory status and poor oxygenation inspite of multiple runs of 

antibiotics, steroids and diuresis. 


Dispo planning pending above.

## 2018-01-17 NOTE — PN
Progress Note, SLP





- Note


Progress Note: 


 Selected Entries











  01/16/18 01/16/18 01/16/18





  06:00 10:00 14:17


 


Breakfast   25%


 


Lunch   25%


 


Supper   


 


Temperature 98.1 F 97.7 F 97.7 F














  01/16/18 01/16/18 01/16/18





  20:02 20:05 22:27


 


Breakfast   


 


Lunch   


 


Supper  25% 


 


Temperature 98 F  98 F














  01/17/18 01/17/18





  06:00 11:44


 


Breakfast  0


 


Lunch  


 


Supper  


 


Temperature 98.3 F 








 Laboratory Tests











  01/15/18 01/17/18





  06:20 06:00


 


WBC  12.4 H D  11.0 H














Pt now on pureed diet PO. Pt is frequently reclining. He refuses to main HOB 

elevated and lowers the HOB due to pain. With HOB reclined, risk of aspiration 

will be increased. 





Pain mgmt in order to maintain HOB elevation?





Supervision with meals. Assist pt, reminding him to swallow HARD with each 

bite.Monitor pulmonary status.





PEG for hydration. Supplemental nutrition via PEG may be beneficial.

## 2018-01-17 NOTE — PN
Physical Exam: 


SUBJECTIVE: Patient seen and examined. No acute events overnight. Offers no new 

complaints. 











OBJECTIVE:





 Vital Signs











 Period  Temp  Pulse  Resp  BP Sys/Penaloza  Pulse Ox


 


 Last 24 Hr  97.9 F-98.3 F    20-20  116-136/65-91  85-94











GENERAL: Laying in bed, in no acute distress


HEAD: Normal with no signs of trauma.


EYES: EOMI, PERRLA


ENT: Dry mucous membranes


LUNGS: course breath sounds throughout (worse than yesterday), mild use of 

accessory muscle


HEART: regular rate and rhythm, no murmurs appreciated


ABDOMEN: RLQ tenderness to palpation, +BS, Peg tube: no drainage, erythema.


EXTREMITIES: 2+ pulses, warm, well-perfused, no edema. 


SKIN: Warm, dry, normal turgor, no rashes or lesions noted














 Laboratory Results - last 24 hr











  01/17/18 01/17/18





  06:00 06:00


 


WBC  11.0 H 


 


RBC  4.50 


 


Hgb  12.9 


 


Hct  41.7 


 


MCV  92.7 


 


MCH  28.7 


 


MCHC  31.0 L 


 


RDW  17.3 H 


 


Plt Count  133 L D 


 


MPV  9.1 


 


Sodium   142


 


Potassium   3.7


 


Chloride   99


 


Carbon Dioxide   37 H


 


Anion Gap   6 L


 


BUN   23 H


 


Creatinine   0.4 L D


 


Creat Clearance w eGFR   > 60


 


Random Glucose   134 H D


 


Calcium   9.0


 


Total Bilirubin   0.4  D


 


AST   14 L D


 


ALT   27  D


 


Alkaline Phosphatase   93


 


Total Protein   6.1 L


 


Albumin   2.6 L








Active Medications











Generic Name Dose Route Start Last Admin





  Trade Name Freq  PRN Reason Stop Dose Admin


 


Acetaminophen  1,000 mg  12/28/17 16:51  01/15/18 10:11





  Ofirmev Injection -  IVPB   1,000 mg





  Q6H PRN   Administration





  FEVER OR PAIN   


 


Albuterol Sulfate  1 amp  12/28/17 16:51  01/04/18 10:11





  Ventolin 0.083% Nebulizer Soln -  NEB   1 amp





  Q4H PRN   Administration





  SHORT OF BREATH/WHEEZING   


 


Albuterol/Ipratropium  1 amp  01/09/18 10:22  01/17/18 16:47





  Duoneb -  NEB   1 amp





  RQID LIV   Administration


 


Amino Acids  30 ml  01/13/18 17:30  01/17/18 10:35





  Prosource No Carb Liquid Pkt  PEG   30 ml





  BID@0800,1730 LIV   Administration


 


Pantoprazole Sodium  40 mg  01/13/18 16:15  01/17/18 10:35





  Protonix Packets For Oral Suspension -  PEG   40 mg





  DAILY LIV   Administration


 


Polyethylene Glycol  17 gm  01/14/18 22:00  01/17/18 10:35





  Miralax (For Daily Use) -  NGT   17 gm





  BID LIV   Administration


 


Prednisone  20 mg  01/18/18 10:00  





  Deltasone -  PEG   





  DAILY LIV   


 


Scopolamine HBr  1 patch  01/07/18 12:30  01/16/18 20:16





  Transderm-Scop -  TD   Not Given





  Q3D LIV   











ASSESSMENT/PLAN:








85yo M with h/o COPD (baseline 3LNC at night), HTN, OA who was found to acute 

hypoxic hypercapnic distress. 





#Acute hypoxic hypercapnic respiratory distress


-2/2 to PNA most likely with component of COPD


-Prednisone 20mg qDaily; continue to taper


-Maintain SpO2 >88-90%


-Pulmonology on board


-Duoneb QID PRN


-Nebs PRN q4h





#Sepsis 2/2 to PNA


-Abx completed


-Suspected on-going aspiration due to no improvement in breath sounds


-worsening breath sounds today. It is documented patient drank juice. 


-Will stop tube feeds, not tolerating.


-s/p PEG tube 1/12; currently being used


-Aspiration precautions


-Afebrile


-Leukocytosis downtrending








#Abdominal Pain


-resolved





#HTN


-Currently controlled


-Continue BP monitoring





#FEN:


Fluids: None


Electrolyte abnormalities: None


Nutrition: 


 Dysphagia puree diet with NO PO fluids





#PPx:


Heparin SQ TID





Dispo:





Poor prognosis. DNR/DNI, no central line or pressors. 


Meeting set up with healthcare proxy for tomorrow to discuss goals of care. 





Visit type





- Emergency Visit


Emergency Visit: Yes


ED Registration Date: 12/27/17


Care time: The patient presented to the Emergency Department on the above date 

and was hospitalized for further evaluation of their emergent condition.





- New Patient


This patient is new to me today: No





- Critical Care


Critical Care patient: No





- Discharge Referral


Referred to [facility] No

## 2018-01-17 NOTE — PN
Progress Note (short form)





- Note


Progress Note: 





PULMONAR





VSS/AFEBRILE





APPEARS IMPROVED





PALE/ANICTERIC


BIBASILAR DIMINISHED BREATH SOUNDS/SCATTERED RHONCHI


S1S2


BS+/PEG


HEEL OFF- NO EDEMA





LABS/IMAGES/MEDS/NOTES REVIEWED





Hypoxemic respiratory failure resolved


Multilobar PNA/ aspiration 


Fluid overload 


Acute COPD exacerbation  


HTN





- peg in place/tolerating oral feedings


-ABX  discontinued as per ID 


-BD TX 


-prednisone to taper


-heparin SubQ


-protonix for GI ppx 


-O2 to maintain saturation 


-DNR/DNI


-Aspiration precautions


 





BRIAN TRAN MD 














Problem List





- Problems


(1) COPD (chronic obstructive pulmonary disease)


Code(s): J44.9 - CHRONIC OBSTRUCTIVE PULMONARY DISEASE, UNSPECIFIED   


Qualifiers: 


   COPD type: COPD with acute lower respiratory infection   Qualified Code(s): 

J44.0 - Chronic obstructive pulmonary disease with acute lower respiratory 

infection   





(2) Pneumonia


Code(s): J18.9 - PNEUMONIA, UNSPECIFIED ORGANISM   


Qualifiers: 


   Pneumonia type: due to unspecified organism   Laterality: left   Lung 

location: lower lobe of lung   Qualified Code(s): J18.1 - Lobar pneumonia, 

unspecified organism   





(3) Anemia


Code(s): D64.9 - ANEMIA, UNSPECIFIED   





(4) Sepsis


Code(s): A41.9 - SEPSIS, UNSPECIFIED ORGANISM   


Qualifiers: 


   Sepsis type: sepsis due to unspecified organism   Qualified Code(s): A41.9 - 

Sepsis, unspecified organism   





(5) Hypertension


Code(s): I10 - ESSENTIAL (PRIMARY) HYPERTENSION   





(6) Chronic steroid use


Code(s): MAH2458 -

## 2018-01-18 LAB
ANION GAP SERPL CALC-SCNC: 9 MMOL/L (ref 8–16)
BUN SERPL-MCNC: 20 MG/DL (ref 7–18)
CALCIUM SERPL-MCNC: 8.9 MG/DL (ref 8.5–10.1)
CHLORIDE SERPL-SCNC: 99 MMOL/L (ref 98–107)
CO2 SERPL-SCNC: 33 MMOL/L (ref 21–32)
CREAT SERPL-MCNC: 0.3 MG/DL (ref 0.7–1.3)
DEPRECATED RDW RBC AUTO: 17.4 % (ref 11.9–15.9)
GLUCOSE SERPL-MCNC: 94 MG/DL (ref 74–106)
HCT VFR BLD CALC: 41 % (ref 35.4–49)
HGB BLD-MCNC: 12.7 GM/DL (ref 11.7–16.9)
MCH RBC QN AUTO: 28.6 PG (ref 25.7–33.7)
MCHC RBC AUTO-ENTMCNC: 30.9 G/DL (ref 32–35.9)
MCV RBC: 92.6 FL (ref 80–96)
PLATELET # BLD AUTO: 124 K/MM3 (ref 134–434)
PMV BLD: 8.7 FL (ref 7.5–11.1)
POTASSIUM SERPLBLD-SCNC: 3.8 MMOL/L (ref 3.5–5.1)
RBC # BLD AUTO: 4.43 M/MM3 (ref 4–5.6)
SODIUM SERPL-SCNC: 141 MMOL/L (ref 136–145)
WBC # BLD AUTO: 9.2 K/MM3 (ref 4–10)

## 2018-01-18 RX ADMIN — ALBUTEROL SULFATE PRN AMP: 2.5 SOLUTION RESPIRATORY (INHALATION) at 03:01

## 2018-01-18 RX ADMIN — PREDNISONE SCH MG: 20 TABLET ORAL at 10:23

## 2018-01-18 RX ADMIN — HEPARIN SODIUM SCH UNIT: 5000 INJECTION, SOLUTION INTRAVENOUS; SUBCUTANEOUS at 22:24

## 2018-01-18 RX ADMIN — IPRATROPIUM BROMIDE AND ALBUTEROL SULFATE SCH AMP: .5; 3 SOLUTION RESPIRATORY (INHALATION) at 08:35

## 2018-01-18 RX ADMIN — HEPARIN SODIUM SCH UNIT: 5000 INJECTION, SOLUTION INTRAVENOUS; SUBCUTANEOUS at 14:01

## 2018-01-18 RX ADMIN — POLYETHYLENE GLYCOL 3350 SCH GM: 17 POWDER, FOR SOLUTION ORAL at 10:23

## 2018-01-18 RX ADMIN — PANTOPRAZOLE SODIUM SCH MG: 40 GRANULE, DELAYED RELEASE ORAL at 10:23

## 2018-01-18 RX ADMIN — POLYETHYLENE GLYCOL 3350 SCH: 17 POWDER, FOR SOLUTION ORAL at 22:04

## 2018-01-18 RX ADMIN — IPRATROPIUM BROMIDE AND ALBUTEROL SULFATE SCH AMP: .5; 3 SOLUTION RESPIRATORY (INHALATION) at 16:41

## 2018-01-18 RX ADMIN — Medication SCH ML: at 17:01

## 2018-01-18 RX ADMIN — IPRATROPIUM BROMIDE AND ALBUTEROL SULFATE SCH AMP: .5; 3 SOLUTION RESPIRATORY (INHALATION) at 12:07

## 2018-01-18 RX ADMIN — HEPARIN SODIUM SCH UNIT: 5000 INJECTION, SOLUTION INTRAVENOUS; SUBCUTANEOUS at 05:52

## 2018-01-18 RX ADMIN — IPRATROPIUM BROMIDE AND ALBUTEROL SULFATE SCH AMP: .5; 3 SOLUTION RESPIRATORY (INHALATION) at 20:00

## 2018-01-18 RX ADMIN — Medication SCH ML: at 08:52

## 2018-01-18 NOTE — PN
Physical Exam: 


SUBJECTIVE: Patient seen and examined. No acute events overnight. Patient 

offers no new complaints. Denies chest pain, dizziness, diarrhea, and abdominal 

pain. 








OBJECTIVE:





 Vital Signs











 Period  Temp  Pulse  Resp  BP Sys/Penaloza  Pulse Ox


 


 Last 24 Hr  97.2 F-97.9 F  74-92  20-22  115-142/69-87  90-96











GENERAL: Laying in bed, in no acute distress


HEAD: Normal with no signs of trauma.


EYES: EOMI, PERRLA


ENT: Dry mucous membranes


LUNGS: course breath sounds throughout, use of accessory muscle


HEART: regular rate and rhythm, no murmurs appreciated


ABDOMEN: +BS, NT, ND, Peg tube: no drainage, erythema.


EXTREMITIES: 2+ pulses, warm, well-perfused, no edema. 


SKIN: Warm, dry, normal turgor, no rashes or lesions noted

















 Laboratory Results - last 24 hr











  01/18/18 01/18/18





  06:30 06:30


 


WBC  9.2 


 


RBC  4.43 


 


Hgb  12.7 


 


Hct  41.0 


 


MCV  92.6 


 


MCH  28.6 


 


MCHC  30.9 L 


 


RDW  17.4 H 


 


Plt Count  124 L 


 


MPV  8.7 


 


Sodium   141


 


Potassium   3.8


 


Chloride   99


 


Carbon Dioxide   33 H


 


Anion Gap   9


 


BUN   20 H


 


Creatinine   0.3 L D


 


Random Glucose   94  D


 


Calcium   8.9








Active Medications











Generic Name Dose Route Start Last Admin





  Trade Name [place]ced[place]  PRN Reason Stop Dose Admin


 


Acetaminophen  1,000 mg  12/28/17 16:51  01/15/18 10:11





  Ofirmev Injection -  IVPB   1,000 mg





  Q6H PRN   Administration





  FEVER OR PAIN   


 


Albuterol Sulfate  1 amp  12/28/17 16:51  01/18/18 03:01





  Ventolin 0.083% Nebulizer Soln -  NEB   1 amp





  Q4H PRN   Administration





  SHORT OF BREATH/WHEEZING   


 


Albuterol/Ipratropium  1 amp  01/09/18 10:22  01/18/18 16:41





  Duoneb -  NEB   1 amp





  RQID LIV   Administration


 


Amino Acids  30 ml  01/13/18 17:30  01/18/18 17:01





  Prosource No Carb Liquid Pkt  PEG   30 ml





  BID@0800,1730 LIV   Administration


 


Heparin Sodium (Porcine)  5,000 unit  01/17/18 22:00  01/18/18 14:01





  Heparin -  SQ   5,000 unit





  TID LIV   Administration


 


Pantoprazole Sodium  40 mg  01/13/18 16:15  01/18/18 10:23





  Protonix Packets For Oral Suspension -  PEG   40 mg





  DAILY LIV   Administration


 


Polyethylene Glycol  17 gm  01/14/18 22:00  01/18/18 10:23





  Miralax (For Daily Use) -  NGT   17 gm





  BID LIV   Administration


 


Prednisone  20 mg  01/18/18 10:00  01/18/18 10:23





  Deltasone -  PEG   20 mg





  DAILY LIV   Administration


 


Scopolamine HBr  1 patch  01/07/18 12:30  01/16/18 20:16





  Transderm-Scop -  TD   Not Given





  Q3D LIV   











ASSESSMENT/PLAN:











ASSESSMENT/PLAN:








87yo M with h/o COPD (baseline 3LNC at night), HTN, OA who was found to acute 

hypoxic hypercapnic distress. 





#Acute hypoxic hypercapnic respiratory distress


-2/2 to PNA most likely with component of COPD


-off antibiotics s/p 8 days cefepime


-Prednisone 20mg; continue to taper


-2 trials with nocturnal tube feeds. Patient noted with increased respiratory 

symptoms. 


-Maintain SpO2 >88-90%


-O2 3L NC


-Pulmonology on board


-Duoneb QID PRN


-Nebs PRN q4h


-Palliative care consulted. 





#Sepsis 2/2 to PNA


-Suspected on-going aspiration due to no improvement in breath sounds


-s/p PEG tube 1/12; 


-stopped tube feeds


-Aspiration precautions


-Afebrile


-Leukocytosis downtrending





#Abdominal Pain


-resolved after BM





#HTN


-Currently controlled


-Continue BP monitoring





#FEN:


No fluids


Will replete if needed


Dysphagia puree diet 





#PPX:


DVT - Heparin SQ TID





Long discussion today with Health care proxy. Discussed hospital course with no 

improvement in symptoms with steroids, antibiotic trials, on going aspiration 

and also failed tube feeds.  Explained to HCP prognosis was poor. Patient is DNR

/DNI. HCP and patien both agreed for no agressive and comfort care. They are 

agreenable to [facility] for hospice and will consider [place]. 


Will FU with Palliative for possible DC tomorrow. 














   





Visit type





- Emergency Visit


Emergency Visit: Yes


ED Registration Date: 12/27/17


Care time: The patient presented to the Emergency Department on the above date 

and was hospitalized for further evaluation of their emergent condition.





- New Patient


This patient is new to me today: No





- Critical Care


Critical Care patient: No

## 2018-01-18 NOTE — PN
Teaching Attending Note


Name of Resident: Radha Ryder





ATTENDING PHYSICIAN STATEMENT


Time of evaluation: 9:40 AM and again 2;30 PM


I saw and evaluated the patient.


I reviewed the resident's note and discussed the case with the resident.


I agree with the resident's findings and plan as documented.








SUBJECTIVE:


Patient seen and examined. Breathing overall unchanged, no new complaints. 





OBJECTIVE:


 Vital Signs











 Period  Temp  Pulse  Resp  BP Sys/Penaloza  Pulse Ox


 


 Last 24 Hr  97.2 F-97.9 F  74-92  20-22  115-142/69-87  90-96








 Intake & Output











 01/15/18 01/16/18 01/17/18 01/18/18





 23:59 23:59 23:59 23:59


 


Intake Total 150 150 715 200


 


Output Total 1400 300 200 100


 


Balance -1250 -150 515 100


 


Weight 161 lb   159 lb 9.6 oz








general: lying in bed, tachypneic, some use of accessory muscles of respiration


Chest: bilateral scattered rhonchi R>L, decreased air entry all over


Abdomen: soft, NT, ND, positive bowel sounds, PEG in place


Extremities: no edema





 Home Medication List











 Medication  Instructions  Recorded  Confirmed  Type


 


Acetaminophen [Tylenol] 2 tab PO Q6H PRN 12/27/17 12/27/17 History


 


Albuterol 2.5/Ipratropium 0.5 1 amp NEB TID 12/27/17 12/27/17 History





[Duoneb -]    


 


Budesonide [Pulmicort 0.5 mg 1 neb PO BID 12/27/17 12/27/17 History





Nebulizer -]    


 


Ipratropium 0.02% Nebulizer 1 amp NEB Q6H PRN 12/27/17 12/27/17 History





[Atrovent 0.02% Nebulizer -]    


 


Menthol/Phenol [Cepastat Lozenge -] 1 each MM QID PRN 12/27/17 12/27/17 History


 


Metoclopramide HCl 10 mg PO TID 12/27/17 12/27/17 History


 


Prednisone 10 mg PO BID 12/27/17 12/27/17 History


 


Tramadol HCl [Ultram] 2 mg PO DAILY 12/27/17 12/27/17 History








 Active Medications











Generic Name Dose Route Start Last Admin





  Trade Name Freq  PRN Reason Stop Dose Admin


 


Acetaminophen  1,000 mg  12/28/17 16:51  01/15/18 10:11





  Ofirmev Injection -  IVPB   1,000 mg





  Q6H PRN   Administration





  FEVER OR PAIN   


 


Albuterol Sulfate  1 amp  12/28/17 16:51  01/18/18 03:01





  Ventolin 0.083% Nebulizer Soln -  NEB   1 amp





  Q4H PRN   Administration





  SHORT OF BREATH/WHEEZING   


 


Albuterol/Ipratropium  1 amp  01/09/18 10:22  01/18/18 16:41





  Duoneb -  NEB   1 amp





  RQID [facility]   Administration


 


Amino Acids  30 ml  01/13/18 17:30  01/18/18 08:52





  Prosource No Carb Liquid Pkt  PEG   30 ml





  BID@0800,1730 [facility]   Administration


 


Heparin Sodium (Porcine)  5,000 unit  01/17/18 22:00  01/18/18 14:01





  Heparin -  SQ   5,000 unit





  TID [facility]   Administration


 


Pantoprazole Sodium  40 mg  01/13/18 16:15  01/18/18 10:23





  Protonix Packets For Oral Suspension -  PEG   40 mg





  DAILY [facility]   Administration


 


Polyethylene Glycol  17 gm  01/14/18 22:00  01/18/18 10:23





  Miralax (For Daily Use) -  NGT   17 gm





  BID [facility]   [facility]ration


 


Prednisone  20 mg  01/18/18 10:00  01/18/18 10:23





  Deltasone -  PEG   20 mg





  DAILY [facility]   Administration


 


Scopolamine HBr  1 patch  01/07/18 12:30  01/16/18 20:16





  Transderm-Scop -  TD   Not Given





  [facility]D [facility]   








 Laboratory Results - last 24 hr











  01/18/18 01/18/18





  06:30 06:30


 


WBC  9.2 


 


RBC  4.43 


 


Hgb  12.7 


 


Hct  41.0 


 


MCV  92.6 


 


MCH  28.6 


 


MCHC  30.9 L 


 


RDW  17.4 H 


 


Plt Count  124 L 


 


MPV  8.7 


 


Sodium   141


 


Potassium   3.8


 


Chloride   99


 


Carbon Dioxide   33 H


 


Anion Gap   9


 


BUN   20 H


 


Creatinine   0.3 L D


 


Random Glucose   94  D


 


Calcium   8.9








 Microbiology





12/27/17 09:15   Blood - Peripheral Venous   Blood Culture - Final


                            NO GROWTH AFTER 5 DAYS INCUBATION


12/27/17 09:15   Blood - Peripheral Venous   Blood Culture - Final


                            NO GROWTH AFTER 5 DAYS INCUBATION


12/27/17 22:52   Urine - Urine Clean Catch   Urine Culture - Final


                            NO GROWTH OBTAINED


12/28/17 18:15   Urine For Antigen Detection   Legionella Antigen - Final


12/28/17 18:15   Urine For Antigen Detection   Streptococcus pneumoniae Antigen 

(M - Final


12/27/17 17:30   Nasopharyngeal Swab   Influenza Types A,B Antigen (JAISON) - Final


12/27/17 17:30   Nasopharyngeal Swab    - Final











ASSESSMENT AND PLAN:


86 year old male with a PMHx of HTN, COPD (3L NC at night), Osteoarthritis, 

Dysphagia, PUD presented to the ER with acute hypoxic respiratory distress





-acute hypoxic/hypercapneic respiratory distress, suspect aspiration PNA, acute 

on chronic COPD with mild diastolic HF.


-Sepsis due to aspiration PNA


-Dysphagia


-leucocytosis, suspect ongoing aspriation


-Back pain


-Abdominal pain


-hypokalemia


-Hypophosphatemia


-HTN


-PUD





plan;


s/p 8 days of cefepime, off antibiotics. WBC improved. Suspect ongoing 

aspiration. 


Aspiration precautions (patient no compliant with HOB elevation). 


Abdominal symptoms resolved after BM. No new concerns.


Trial with nocturnal tube feeds x 2, patient noted with increased gurgling and 

respiratory symptoms. 


Dysphagia pureed diet. 


Palliative care consulted. 


detailed meeting with HCP today. Explained about recurrent trials with broad 

spectrum antibiotics, steroids, diuresis, and ongoing aspiration with or 

without feeds with poor functional status, continued tenuous respiratory status 

with respiratory distress at rest and poor oral intake. 


Discussed overall poor prognosis. HCP confirms DNR/DNI, no aggressive measures. 

HCP wants patient to be comfortable and does not want to be brought back to the 

hospital. Agrees with focusing on comfort measures and DNH. Patient agreable 

with the plan. 


Agreeable to [facility] with hospice if able. Will consider [place] as indicated. 


Will follow up with palliative care and social work for possible d/c to [facility] 

with hospice in AM.  


Total time spent including patient visit and exam, detailed discussion about 

goals of care and co-ordination of care 45 min.

## 2018-01-18 NOTE — PN
Progress Note, Physician


History of Present Illness: 





No events. Awake and alert. Mild pain at PEG. Skin intact, minimal leak, no 

bleeding.








- Current Medication List


Current Medications: 


Active Medications





Acetaminophen (Ofirmev Injection -)  1,000 mg IVPB Q6H PRN


   PRN Reason: FEVER OR PAIN


   Last Admin: 01/15/18 10:11 Dose:  1,000 mg


Albuterol Sulfate (Ventolin 0.083% Nebulizer Soln -)  1 amp NEB Q4H PRN


   PRN Reason: SHORT OF BREATH/WHEEZING


   Last Admin: 01/18/18 03:01 Dose:  1 amp


Albuterol/Ipratropium (Duoneb -)  1 amp NEB RQID [facility]


   Last Admin: 01/18/18 12:07 Dose:  1 amp


Amino Acids (Prosource No Carb Liquid Pkt)  30 ml PEG BID@0800,1730 [facility]


   Last Admin: 01/18/18 08:52 Dose:  30 ml


Heparin Sodium (Porcine) (Heparin -)  5,000 unit SQ TID [facility]


   Last Admin: 01/18/18 14:01 Dose:  5,000 unit


Pantoprazole Sodium (Protonix Packets For Oral Suspension -)  40 mg PEG DAILY 

[facility]


   Last Admin: 01/18/18 10:23 Dose:  40 mg


Polyethylene Glycol (Miralax (For Daily Use) -)  17 gm NGT BID [facility]


   Last Admin: 01/18/18 10:23 Dose:  17 gm


Prednisone (Deltasone -)  20 mg PEG DAILY [facility]


   Last Admin: 01/18/18 10:23 Dose:  20 mg


Scopolamine HBr (Transderm-Scop -)  1 patch TD Q3D [facility]


   Last Admin: 01/16/18 20:16 Dose:  Not Given











- Objective


Vital Signs: 


 Vital Signs











Temperature  97.8 F   01/18/18 15:20


 


Pulse Rate  87   01/18/18 15:20


 


Respiratory Rate  20   01/18/18 15:20


 


Blood Pressure  127/69   01/18/18 15:20


 


O2 Sat by Pulse Oximetry (%)  96   01/18/18 09:00











Gastrointestinal: Yes: Other (G-tube bumper was to tight. Readjusted to 4 from 

3.5.)


Neurological: Yes: Alert, Oriented


Labs: 


 CBC, BMP





 01/18/18 06:30 





 01/18/18 06:30 





 INR, PTT











INR  0.96  (0.82-1.09)   01/12/18  06:35    








 CBCD











WBC  9.2 K/mm3 (4.0-10.0)   01/18/18  06:30    


 


RBC  4.43 M/mm3 (4.00-5.60)   01/18/18  06:30    


 


Hgb  12.7 GM/dL (11.7-16.9)   01/18/18  06:30    


 


Hct  41.0 % (35.4-49)   01/18/18  06:30    


 


MCV  92.6 fl (80-96)   01/18/18  06:30    


 


MCHC  30.9 g/dl (32.0-35.9)  L  01/18/18  06:30    


 


RDW  17.4 % (11.9-15.9)  H  01/18/18  06:30    


 


Plt Count  124 K/MM3 (134-434)  L  01/18/18  06:30    


 


MPV  8.7 fl (7.5-11.1)   01/18/18  06:30    








 CMP











Sodium  141 mmol/L (136-145)   01/18/18  06:30    


 


Potassium  3.8 mmol/L (3.5-5.1)   01/18/18  06:30    


 


Chloride  99 mmol/L ()   01/18/18  06:30    


 


Carbon Dioxide  33 mmol/L (21-32)  H  01/18/18  06:30    


 


Anion Gap  9  (8-16)   01/18/18  06:30    


 


BUN  20 mg/dL (7-18)  H  01/18/18  06:30    


 


Creatinine  0.3 mg/dL (0.7-1.3)  L D 01/18/18  06:30    


 


Creat Clearance w eGFR  > 60  (>60)   01/17/18  06:00    


 


Calcium  8.9 mg/dL (8.5-10.1)   01/18/18  06:30    


 


Total Bilirubin  0.4 mg/dL (0.2-1.0)  D 01/17/18  06:00    


 


AST  14 U/L (15-37)  L D 01/17/18  06:00    


 


ALT  27 U/L (12-78)  D 01/17/18  06:00    


 


Alkaline Phosphatase  93 U/L ()   01/17/18  06:00    


 


Total Protein  6.1 g/dl (6.4-8.2)  L  01/17/18  06:00    


 


Albumin  2.6 g/dl (3.4-5.0)  L  01/17/18  06:00    














Problem List





- Problems


(1) Aspiration into airway


Code(s): T17.908A - UNSP FB IN RESP TRACT, PART UNSP CAUSING OTH INJURY, INIT   





(2) Inadequate oral nutritional intake


Code(s): R63.8 - OTHER SYMPTOMS AND SIGNS CONCERNING FOOD AND FLUID INTAKE   





Assessment/Plan











Keep external bumper at about 4. It should be not too tight, or too lose


PEG care and aspiration precautions as per protocol


PO/PEG nutrition as tolerated. 


PEG hydration

## 2018-01-18 NOTE — PN
Progress Note (short form)





- Note


Progress Note: 


Resting in NAD.  Breathing non-labored.  


No acute events overnight. 








 


 Intake & Output











 01/15/18 01/16/18 01/17/18 01/18/18





 23:59 23:59 23:59 23:59


 


Intake Total 150 150 715 200


 


Output Total 1400 300 200 


 


Balance -1250 -150 515 200


 


Weight 161 lb   159 lb 9.6 oz








 Last Vital Signs











Temp Pulse Resp BP Pulse Ox


 


 97.8 F   79   20   138/70   90 L


 


 01/18/18 09:09  01/18/18 09:09  01/18/18 09:09  01/18/18 09:09  01/17/18 21:00








Active Medications





Acetaminophen (Ofirmev Injection -)  1,000 mg IVPB Q6H PRN


   PRN Reason: FEVER OR PAIN


   Last Admin: 01/15/18 10:11 Dose:  1,000 mg


Albuterol Sulfate (Ventolin 0.083% Nebulizer Soln -)  1 amp NEB Q4H PRN


   PRN Reason: SHORT OF BREATH/WHEEZING


   Last Admin: 01/18/18 03:01 Dose:  1 amp


Albuterol/Ipratropium (Duoneb -)  1 amp NEB RQID [facility] to [facility]


   Last Admin: 01/17/18 21:45 Dose:  1 amp


Amino Acids (Prosource No Carb Liquid Pkt)  30 ml PEG BID@0800,1730 [facility]


   Last Admin: 01/18/18 08:52 Dose:  30 ml


Heparin Sodium (Porcine) (Heparin -)  5,000 unit SQ TID [facility]


   Last Admin: 01/18/18 05:52 Dose:  5,000 unit


Pantoprazole Sodium (Protonix Packets For Oral Suspension -)  40 mg PEG DAILY 

[facility]


   Last Admin: 01/17/18 10:35 Dose:  40 mg


Polyethylene Glycol (Miralax (For Daily Use) -)  17 gm NGT BID [facility]


   Last Admin: 01/17/18 22:06 Dose:  17 gm


Prednisone (Deltasone -)  20 mg PEG DAILY [facility]


Scopolamine HBr (Transderm-Scop -)  1 patch TD Q3D [facility]


   Last Admin: 01/16/18 20:16 Dose:  Not Given








Gen: NAD  


Heart: RRR


Lung: bilateral rhonchi, no wheeze  


Abd: soft, nontender


Ext: no edema


 


 Laboratory Results - last 24 hr











  01/18/18 01/18/18





  06:30 06:30


 


WBC  9.2 


 


RBC  4.43 


 


Hgb  12.7 


 


Hct  41.0 


 


MCV  92.6 


 


MCH  28.6 


 


MCHC  30.9 L 


 


RDW  17.4 H 


 


Plt Count  124 L 


 


MPV  8.7 


 


Sodium   141


 


Potassium   3.8


 


Chloride   99


 


Carbon Dioxide   33 H


 


Anion Gap   9


 


BUN   20 H


 


Creatinine   0.3 L D


 


Random Glucose   94  D


 


Calcium   8.9











A/P


Acute COPD/Bronchiectasis Exacerbation


Acute on Chronic Hypoxic Respiratory Failure


r/o Pneumonia


Emphysema


+Troponins likely Demand Ischemia


HTN





-  prednisone taper 


-  inhaled bronchodilators standing and PRN


-  O2 to keep SpO2 >90%


-  OOB to chair if possible


-  rehab/physical therapy


-  DVT prophylaxis


-  DNR/DNI 


-  D/C planning 





Dr Miner

## 2018-01-18 NOTE — PN
Progress Note, SLP





- Note


Progress Note: 





 Selected Entries











  01/17/18 01/17/18 01/17/18





  06:00 10:00 15:35


 


Lunch   50%


 


Supper   


 


Temperature 98.3 F 98.3 F 97.9 F














  01/17/18 01/17/18 01/17/18





  20:15 20:17 22:00


 


Lunch   


 


Supper  25% 


 


Temperature 97.9 F  97.5 F L














  01/18/18 01/18/18 01/18/18





  05:37 05:51 09:09


 


Lunch   


 


Supper   


 


Temperature 97.6 F 97.2 F L 97.8 F








Pureed diet by mouth. Receiving hydration via PEG.


Plan is to return [facility].

## 2018-01-19 LAB
ANION GAP SERPL CALC-SCNC: 6 MMOL/L (ref 8–16)
BUN SERPL-MCNC: 19 MG/DL (ref 7–18)
CALCIUM SERPL-MCNC: 8.5 MG/DL (ref 8.5–10.1)
CHLORIDE SERPL-SCNC: 99 MMOL/L (ref 98–107)
CO2 SERPL-SCNC: 34 MMOL/L (ref 21–32)
CREAT SERPL-MCNC: 0.3 MG/DL (ref 0.7–1.3)
DEPRECATED RDW RBC AUTO: 17.5 % (ref 11.9–15.9)
GLUCOSE SERPL-MCNC: 99 MG/DL (ref 74–106)
HCT VFR BLD CALC: 40.2 % (ref 35.4–49)
HGB BLD-MCNC: 12.3 GM/DL (ref 11.7–16.9)
MCH RBC QN AUTO: 28.6 PG (ref 25.7–33.7)
MCHC RBC AUTO-ENTMCNC: 30.7 G/DL (ref 32–35.9)
MCV RBC: 93.2 FL (ref 80–96)
PLATELET # BLD AUTO: 126 K/MM3 (ref 134–434)
PMV BLD: 9.1 FL (ref 7.5–11.1)
POTASSIUM SERPLBLD-SCNC: 3.7 MMOL/L (ref 3.5–5.1)
RBC # BLD AUTO: 4.31 M/MM3 (ref 4–5.6)
SODIUM SERPL-SCNC: 139 MMOL/L (ref 136–145)
WBC # BLD AUTO: 9 K/MM3 (ref 4–10)

## 2018-01-19 RX ADMIN — IPRATROPIUM BROMIDE AND ALBUTEROL SULFATE SCH AMP: .5; 3 SOLUTION RESPIRATORY (INHALATION) at 20:45

## 2018-01-19 RX ADMIN — Medication SCH ML: at 09:23

## 2018-01-19 RX ADMIN — POLYETHYLENE GLYCOL 3350 SCH GM: 17 POWDER, FOR SOLUTION ORAL at 09:24

## 2018-01-19 RX ADMIN — ALBUTEROL SULFATE PRN AMP: 2.5 SOLUTION RESPIRATORY (INHALATION) at 15:28

## 2018-01-19 RX ADMIN — ACETAMINOPHEN PRN MG: 325 TABLET ORAL at 20:06

## 2018-01-19 RX ADMIN — HEPARIN SODIUM SCH UNIT: 5000 INJECTION, SOLUTION INTRAVENOUS; SUBCUTANEOUS at 13:08

## 2018-01-19 RX ADMIN — PANTOPRAZOLE SODIUM SCH MG: 40 GRANULE, DELAYED RELEASE ORAL at 09:22

## 2018-01-19 RX ADMIN — IPRATROPIUM BROMIDE AND ALBUTEROL SULFATE SCH AMP: .5; 3 SOLUTION RESPIRATORY (INHALATION) at 16:12

## 2018-01-19 RX ADMIN — Medication SCH ML: at 17:24

## 2018-01-19 RX ADMIN — SCOPALAMINE SCH PATCH: 1 PATCH, EXTENDED RELEASE TRANSDERMAL at 13:08

## 2018-01-19 RX ADMIN — POLYETHYLENE GLYCOL 3350 SCH GM: 17 POWDER, FOR SOLUTION ORAL at 22:34

## 2018-01-19 RX ADMIN — IPRATROPIUM BROMIDE AND ALBUTEROL SULFATE SCH AMP: .5; 3 SOLUTION RESPIRATORY (INHALATION) at 08:55

## 2018-01-19 RX ADMIN — HEPARIN SODIUM SCH UNIT: 5000 INJECTION, SOLUTION INTRAVENOUS; SUBCUTANEOUS at 06:18

## 2018-01-19 RX ADMIN — IPRATROPIUM BROMIDE AND ALBUTEROL SULFATE SCH AMP: .5; 3 SOLUTION RESPIRATORY (INHALATION) at 12:19

## 2018-01-19 RX ADMIN — HEPARIN SODIUM SCH UNIT: 5000 INJECTION, SOLUTION INTRAVENOUS; SUBCUTANEOUS at 22:33

## 2018-01-19 RX ADMIN — PREDNISONE SCH MG: 20 TABLET ORAL at 09:23

## 2018-01-19 NOTE — PN
Teaching Attending Note


Name of Resident: Radha Ryder





ATTENDING PHYSICIAN STATEMENT


time of evaluation: 1:20 PM


I saw and evaluated the patient.


I reviewed the resident's note and discussed the case with the resident.


I agree with the resident's findings and plan as documented.








SUBJECTIVE:


Patient seen and examined. breathing overall unchanged, no new complaints. 





OBJECTIVE:


 Vital Signs











 Period  Temp  Pulse  Resp  BP Sys/Penaloza  Pulse Ox


 


 Last 24 Hr  97.4 F-98.0 F  66-77  18-20  114-129/68-74  96-98








 Intake & Output











 01/16/18 01/17/18 01/18/18 01/19/18





 23:59 23:59 23:59 23:59


 


Intake Total 150 715 300 500


 


Output Total 300 200 350 650


 


Balance -150 515 -50 -150


 


Weight   159 lb 9.6 oz 








General: lying in bed, mild tachypnea, some use of accessory muscles of 

respiration


Chest: coarse bilateral rhonchi





 Home Medication List











 Medication  Instructions  Recorded  Confirmed  Type


 


Acetaminophen [Tylenol] 2 tab PO Q6H PRN 12/27/17 12/27/17 History


 


Albuterol 2.5/Ipratropium 0.5 1 amp NEB TID 12/27/17 12/27/17 History





[Duoneb -]    


 


Budesonide [Pulmicort 0.5 mg 1 neb PO BID 12/27/17 12/27/17 History





Nebulizer -]    


 


Ipratropium 0.02% Nebulizer 1 amp NEB Q6H PRN 12/27/17 12/27/17 History





[Atrovent 0.02% Nebulizer -]    


 


Menthol/Phenol [Cepastat Lozenge -] 1 each MM QID PRN 12/27/17 12/27/17 History


 


Metoclopramide HCl 10 mg PO TID 12/27/17 12/27/17 History


 


Tramadol HCl [Ultram] 2 mg PO DAILY 12/27/17 12/27/17 History








 Active Medications











Generic Name Dose Route Start Last Admin





  Trade Name Freq  PRN Reason Stop Dose Admin


 


Acetaminophen  1,000 mg  12/28/17 16:51  01/15/18 10:11





  Ofirmev Injection -  IVPB   1,000 mg





  Q6H PRN   Administration





  FEVER OR PAIN   


 


Albuterol Sulfate  1 amp  12/28/17 16:51  01/19/18 15:28





  Ventolin 0.083% Nebulizer Soln -  NEB   1 amp





  Q4H PRN   Administration





  SHORT OF BREATH/WHEEZING   


 


Albuterol/Ipratropium  1 amp  01/09/18 10:22  01/19/18 16:12





  Duoneb -  NEB   1 amp





  RQID [facility]   Administration


 


Amino Acids  30 ml  01/13/18 17:30  01/19/18 17:24





  Prosource No Carb Liquid Pkt  PEG   30 ml





  BID@0800,1730 [facility]   Administration


 


Heparin Sodium (Porcine)  5,000 unit  01/17/18 22:00  01/19/18 13:08





  Heparin -  SQ   5,000 unit





  TID [facility]   Administration


 


Pantoprazole Sodium  40 mg  01/13/18 16:15  01/19/18 09:22





  Protonix Packets For Oral Suspension -  PEG   40 mg





  DAILY [facility]   Administration


 


Polyethylene Glycol  17 gm  01/14/18 22:00  01/19/18 09:24





  Miralax (For Daily Use) -  NGT   17 gm





  BID [facility]   Administration


 


Prednisone  20 mg  01/18/18 10:00  01/19/18 09:23





  Deltasone -  PEG   20 mg





  DAILY [facility]   Administration


 


Scopolamine HBr  1 patch  01/07/18 12:30  01/19/18 13:08





  Transderm-Scop -  TD   1 patch





  Q3D [facility]   Administration








 Laboratory Results - last 24 hr











  01/19/18 01/19/18





  06:30 06:30


 


WBC  9.0 


 


RBC  4.31 


 


Hgb  12.3 


 


Hct  40.2 


 


MCV  93.2 


 


MCH  28.6 


 


MCHC  30.7 L 


 


RDW  17.5 H 


 


Plt Count  126 L 


 


MPV  9.1 


 


Sodium   139


 


Potassium   3.7


 


Chloride   99


 


Carbon Dioxide   34 H


 


Anion Gap   6 L


 


BUN   19 H


 


Creatinine   0.3 L


 


Random Glucose   99


 


Calcium   8.5








 Microbiology





12/27/17 09:15   Blood - Peripheral Venous   Blood Culture - Final


                            NO GROWTH AFTER 5 DAYS INCUBATION


12/27/17 09:15   Blood - Peripheral Venous   Blood Culture - Final


                            NO GROWTH AFTER 5 DAYS INCUBATION


12/27/17 22:52   Urine - Urine Clean Catch   Urine Culture - Final


                            NO GROWTH OBTAINED


12/28/17 18:15   Urine For Antigen Detection   Legionella Antigen - Final


12/28/17 18:15   Urine For Antigen Detection   Streptococcus pneumoniae Antigen 

(M - Final


12/27/17 17:30   Nasopharyngeal Swab   Influenza Types A,B Antigen (JAISON) - Final


12/27/17 17:30   Nasopharyngeal Swab    - Final











ASSESSMENT AND PLAN:


86 year old male with a PMHx of HTN, COPD (3L NC at night), Osteoarthritis, 

Dysphagia, PUD presented to the ER with acute hypoxic respiratory distress





-acute hypoxic/hypercapneic respiratory distress, suspect aspiration PNA, acute 

on chronic COPD with mild diastolic HF.


-Sepsis due to aspiration PNA


-Dysphagia


-leucocytosis, suspect ongoing aspriation


-Back pain


-Abdominal pain


-hypokalemia


-Hypophosphatemia


-HTN


-PUD





plan;


s/p 8 days of cefepime, off antibiotics. WBC improved. Suspect ongoing 

aspiration. 


Aspiration precautions (patient no compliant with HOB elevation). 


Abdominal symptoms resolved after BM. No new concerns.


Trial with nocturnal tube feeds x 2, patient noted with increased gurgling and 

respiratory symptoms. 


Dysphagia pureed diet. 


Palliative care consulted. 


86 year old male with a PMHx of HTN, COPD (3L NC at night), Osteoarthritis, 

Dysphagia, PUD presented to the ER with acute hypoxic respiratory distress





-acute hypoxic/hypercapneic respiratory distress, suspect aspiration PNA, acute 

on chronic COPD with mild diastolic HF.


-Sepsis due to aspiration PNA


-Dysphagia


-leucocytosis, suspect ongoing aspriation


-Back pain


-Abdominal pain


-hypokalemia


-Hypophosphatemia


-HTN


-PUD





plan;


s/p 8 days of cefepime, off antibiotics. WBC improved. Suspect ongoing 

aspiration. 


Aspiration precautions (patient no compliant with HOB elevation). 


Abdominal symptoms resolved after BM. No new concerns.


Trial with nocturnal tube feeds x 2, patient noted with increased gurgling and 

respiratory symptoms. 


Dysphagia pureed diet. 


Palliative care consulted. 


Discussed with HCP, goals to focus on comfort and DNH. 


[facility] unable to take patient back. 


Application sent to [facility]. ANticipate d/c if patient patient accepted to 

[facility] and arrangements made.

## 2018-01-19 NOTE — PN
Progress Note (short form)





- Note


Progress Note: 





PULMONAR





VSS/AFEBRILE





CHRONICALLY ILL IN APPEARANCE





PALE/ANICTERIC


BIBASILAR DIMINISHED BREATH SOUNDS/SCATTERED RHONCHI


S1S2


BS+/PEG


HEEL OFF- NO EDEMA





LABS/IMAGES/MEDS/NOTES REVIEWED





Hypoxemic respiratory failure resolved


Multilobar PNA/ aspiration 


Fluid overload 


Acute COPD exacerbation  


HTN





- peg in place/


-ABX  discontinued as per ID 


-BD TX 


-prednisone to taper


-heparin SubQ


-protonix for GI ppx 


-O2 to maintain saturation 


-DNR/DNI


-Aspiration precautions


-Agree with attending regarding need for comfort measures and hospice evaluation





-Please call PRN  





Thank you,


 


BRIAN TRAN MD 














Problem List





- Problems


(1) COPD (chronic obstructive pulmonary disease)


Code(s): J44.9 - CHRONIC OBSTRUCTIVE PULMONARY DISEASE, UNSPECIFIED   


Qualifiers: 


   COPD type: COPD with acute lower respiratory infection   Qualified Code(s): 

J44.0 - Chronic obstructive pulmonary disease with acute lower respiratory 

infection   





(2) Pneumonia


Code(s): J18.9 - PNEUMONIA, UNSPECIFIED ORGANISM   


Qualifiers: 


   Pneumonia type: due to unspecified organism   Laterality: left   Lung 

location: lower lobe of lung   Qualified Code(s): J18.1 - Lobar pneumonia, 

unspecified organism   





(3) Anemia


Code(s): D64.9 - ANEMIA, UNSPECIFIED   





(4) Sepsis


Code(s): A41.9 - SEPSIS, UNSPECIFIED ORGANISM   


Qualifiers: 


   Sepsis type: sepsis due to unspecified organism   Qualified Code(s): A41.9 - 

Sepsis, unspecified organism   





(5) Hypertension


Code(s): I10 - ESSENTIAL (PRIMARY) HYPERTENSION   





(6) Chronic steroid use


Code(s): EQJ5188 -

## 2018-01-19 NOTE — PN
Progress Note, Physician


History of Present Illness: 





No events. Awake and alert. Skin intact, no leak, no bleeding








- Current Medication List


Current Medications: 


Active Medications





Acetaminophen (Ofirmev Injection -)  1,000 mg IVPB Q6H PRN


   PRN Reason: FEVER OR PAIN


   Last Admin: 01/15/18 10:11 Dose:  1,000 mg


Albuterol Sulfate (Ventolin 0.083% Nebulizer Soln -)  1 amp NEB Q4H PRN


   PRN Reason: SHORT OF BREATH/WHEEZING


   Last Admin: 01/18/18 03:01 Dose:  1 amp


Albuterol/Ipratropium (Duoneb -)  1 amp NEB RQID [facility]


   Last Admin: 01/19/18 12:19 Dose:  1 amp


Amino Acids (Prosource No Carb Liquid Pkt)  30 ml PEG BID@0800,1730 [facility]


   Last Admin: 01/19/18 09:23 Dose:  30 ml


Heparin Sodium (Porcine) (Heparin -)  5,000 unit SQ TID [facility]


   Last Admin: 01/19/18 13:08 Dose:  5,000 unit


Pantoprazole Sodium (Protonix Packets For Oral Suspension -)  40 mg PEG DAILY 

[facility]


   Last Admin: 01/19/18 09:22 Dose:  40 mg


Polyethylene Glycol (Miralax (For Daily Use) -)  17 gm NGT BID [facility]


   Last Admin: 01/19/18 09:24 Dose:  17 gm


Prednisone (Deltasone -)  20 mg PEG DAILY [facility]


   Last Admin: 01/19/18 09:23 Dose:  20 mg


Scopolamine HBr (Transderm-Scop -)  1 patch TD Q3D [facility]


   Last Admin: 01/19/18 13:08 Dose:  1 patch











- Objective


Vital Signs: 


 Vital Signs











Temperature  97.7 F   01/19/18 14:40


 


Pulse Rate  76   01/19/18 14:40


 


Respiratory Rate  20   01/19/18 14:40


 


Blood Pressure  129/74   01/19/18 14:40


 


O2 Sat by Pulse Oximetry (%)  98   01/19/18 09:00











Neurological: Yes: Alert, Oriented


Labs: 


 CBC, BMP





 01/19/18 06:30 





 01/19/18 06:30 





 INR, PTT











INR  0.96  (0.82-1.09)   01/12/18  06:35    








 Laboratory Results - last 24 hr











  01/19/18 01/19/18





  06:30 06:30


 


WBC  9.0 


 


RBC  4.31 


 


Hgb  12.3 


 


Hct  40.2 


 


MCV  93.2 


 


MCH  28.6 


 


MCHC  30.7 L 


 


RDW  17.5 H 


 


Plt Count  126 L 


 


MPV  9.1 


 


Sodium   139


 


Potassium   3.7


 


Chloride   99


 


Carbon Dioxide   34 H


 


Anion Gap   6 L


 


BUN   19 H


 


Creatinine   0.3 L


 


Random Glucose   99


 


Calcium   8.5














Problem List





- Problems


(1) Aspiration into airway


Code(s): T17.908A - UNSP FB IN RESP TRACT, PART UNSP CAUSING OTH INJURY, INIT   





(2) Inadequate oral nutritional intake


Code(s): R63.8 - OTHER SYMPTOMS AND SIGNS CONCERNING FOOD AND FLUID INTAKE   





Assessment/Plan











Keep external bumper at about 4. It should be not too tight, or too lose


PEG care and aspiration precautions as per protocol


PO/PEG nutrition as tolerated. 


PEG hydration

## 2018-01-19 NOTE — DS
Physical Exam: 


SUBJECTIVE: Patient seen and examined








OBJECTIVE: Patient seen and examined.


Events overnight:


Patient not tolerating peg tube flushes. Became more congested. 


When stopped, patient improved as per nurse.





 Patient offers no new complaints. Denies chest pain, dizziness, diarrhea, and 

abdominal pain. 





 Vital Signs











 Period  Temp  Pulse  Resp  BP Sys/Penaloza  Pulse Ox


 


 Last 24 Hr  97.4 F-98.2 F  66-77  18-20  113-129/68-74  96-98








PHYSICAL EXAM





GENERAL: Laying in bed, in no acute distress


HEAD: Normal with no signs of trauma.


EYES: EOMI, PERRLA


ENT: Dry mucous membranes


LUNGS: course breath sounds throughout, use of accessory muscle


HEART: regular rate and rhythm, no murmurs appreciated


ABDOMEN: +BS, NT, ND, Peg tube: no drainage, erythema.


EXTREMITIES: 2+ pulses, warm, well-perfused, no edema. 


SKIN: Warm, dry, normal turgor, no rashes or lesions noted








LABS


 Laboratory Results - last 24 hr











  01/19/18 01/19/18





  06:30 06:30


 


WBC  9.0 


 


RBC  4.31 


 


Hgb  12.3 


 


Hct  40.2 


 


MCV  93.2 


 


MCH  28.6 


 


MCHC  30.7 L 


 


RDW  17.5 H 


 


Plt Count  126 L 


 


MPV  9.1 


 


Sodium   139


 


Potassium   3.7


 


Chloride   99


 


Carbon Dioxide   34 H


 


Anion Gap   6 L


 


BUN   19 H


 


Creatinine   0.3 L


 


Random Glucose   99


 


Calcium   8.5











HOSPITAL COURSE:





Date of Admission:12/27/17








87yo M with h/o COPD (baseline 3LNC at night), HTN, OA who was found to acute 

hypoxic hypercapnic distress secondary to PNA and and most likely with 

component of COPD with Sepsis and ongoing aspiration. Patient was started on 

recurrent trials of  broad spectrum antibiotics including Cefepime and steroids 

without improvement of function status. He also had ongoing aspiration with or 

without feeds status without improvement. Long discussion with Health care 

proxy. Discussed hospital course with no improvement in symptoms with steroids, 

antibiotic trials, on going aspiration and also failed tube feeds.  Explained 

to HCP prognosis was poor. Patient is DNR/DNI. HCP and patient both agreed for 

no agressive and comfort care. They are agreeable for hospice care. 




















Date of Discharge: 01/19/18














Discharge Summary


Reason For Visit: SEPSIS,COPD,PNEUMONIA


Current Active Problems





Anemia (Acute)


Aspiration into airway (Acute)


COPD (chronic obstructive pulmonary disease) (Acute)


Chronic steroid use (Acute)


Hypertension (Acute)


Inadequate oral nutritional intake (Acute)


Pneumonia (Acute)


Respiratory failure (Acute)


Sepsis (Acute)








Condition: Poor





- Instructions


Diet, Activity, Other Instructions: 


You will be sent to [facility] for Hospice care. 


Referrals: 


Kostas Reeves MD [Primary Care Provider] - 


Disposition: HOME





- Home Medications


Comprehensive Discharge Medication List: 


Ambulatory Orders





Acetaminophen [Tylenol] 2 tab PO Q6H PRN 12/27/17 


Albuterol 2.5/Ipratropium 0.5 [Duoneb -] 1 amp NEB TID 12/27/17 


Budesonide [Pulmicort 0.5 mg Nebulizer -] 1 neb PO BID 12/27/17 


Ipratropium 0.02% Nebulizer [Atrovent 0.02% Nebulizer -] 1 amp NEB Q6H PRN 12/27 /17 


Menthol/Phenol [Cepastat Lozenge -] 1 each MM QID PRN 12/27/17 


Metoclopramide HCl 10 mg PO TID 12/27/17 


Tramadol HCl [Ultram] 2 mg PO DAILY 12/27/17 


Amino Acids/Protein Hydrolys [Prosource No Carb Liquid Pkt] 30 ml PEG BID@0800,

1730  packet 01/18/18 


Pantoprazole Suspension [Protonix Packets For Oral Suspension -] 40 mg PEG 

DAILY  packet 01/18/18 


Polyethylene Glycol 3350 [Miralax 119 gm Btl -] 17 gm NGT BID  bottle 01/18/18 


Scopolamine Hydrobromide [Transderm-Scop -] 1 patch TD Q3D  patch.td72 01/18/18 











- Discharge Referral


Referred to [facility] No

## 2018-01-20 RX ADMIN — POLYETHYLENE GLYCOL 3350 SCH GM: 17 POWDER, FOR SOLUTION ORAL at 09:56

## 2018-01-20 RX ADMIN — PREDNISONE SCH MG: 20 TABLET ORAL at 09:56

## 2018-01-20 RX ADMIN — HEPARIN SODIUM SCH UNIT: 5000 INJECTION, SOLUTION INTRAVENOUS; SUBCUTANEOUS at 14:06

## 2018-01-20 RX ADMIN — IPRATROPIUM BROMIDE AND ALBUTEROL SULFATE SCH AMP: .5; 3 SOLUTION RESPIRATORY (INHALATION) at 08:15

## 2018-01-20 RX ADMIN — IPRATROPIUM BROMIDE AND ALBUTEROL SULFATE SCH AMP: .5; 3 SOLUTION RESPIRATORY (INHALATION) at 20:59

## 2018-01-20 RX ADMIN — POLYETHYLENE GLYCOL 3350 SCH GM: 17 POWDER, FOR SOLUTION ORAL at 22:03

## 2018-01-20 RX ADMIN — HEPARIN SODIUM SCH UNIT: 5000 INJECTION, SOLUTION INTRAVENOUS; SUBCUTANEOUS at 22:02

## 2018-01-20 RX ADMIN — IPRATROPIUM BROMIDE AND ALBUTEROL SULFATE SCH AMP: .5; 3 SOLUTION RESPIRATORY (INHALATION) at 11:20

## 2018-01-20 RX ADMIN — IPRATROPIUM BROMIDE AND ALBUTEROL SULFATE SCH AMP: .5; 3 SOLUTION RESPIRATORY (INHALATION) at 16:15

## 2018-01-20 RX ADMIN — HEPARIN SODIUM SCH UNIT: 5000 INJECTION, SOLUTION INTRAVENOUS; SUBCUTANEOUS at 05:37

## 2018-01-20 RX ADMIN — PANTOPRAZOLE SODIUM SCH MG: 40 GRANULE, DELAYED RELEASE ORAL at 09:56

## 2018-01-20 RX ADMIN — Medication SCH ML: at 16:58

## 2018-01-20 RX ADMIN — ACETAMINOPHEN PRN MG: 325 TABLET ORAL at 14:06

## 2018-01-20 RX ADMIN — Medication SCH ML: at 09:56

## 2018-01-20 NOTE — DS
Physical Exam: 


SUBJECTIVE: Patient seen and examined. 


Overnight events: patient not able to tolerate hourly Peg Tube flushes as per 

nurse. She says patient gets more congested and becomes dyspneic. 


Patient offers no new complaints. Denies chest pain, abdominal pain, dizziness, 

nausea, vomiting, diarrhea. 








OBJECTIVE:





 Vital Signs











 Period  Temp  Pulse  Resp  BP Sys/Penaloza  Pulse Ox


 


 Last 24 Hr  97.4 F-98.5 F    18-20  113-127/71-76  92-94








PHYSICAL EXAM





GENERAL: On 2 L NC, Laying in bed, in no acute distress


HEAD: Normal with no signs of trauma.


EYES: EOMI, PERRLA


ENT: Dry mucous membranes


LUNGS: course breath sounds throughout > on right side, use of accessory muscle


HEART: regular rate and rhythm, no murmurs appreciated


ABDOMEN: +BS, NT, ND, Peg tube: no drainage, erythema.


EXTREMITIES: 2+ pulses, warm, well-perfused, no edema. 


SKIN: Warm, dry, normal turgor, no rashes or lesions noted








LABS





HOSPITAL COURSE:





Date of Admission:12/27/17








85yo M with h/o COPD (baseline 3LNC at night), HTN, OA who was found to acute 

hypoxic hypercapnic distress secondary to PNA and and most likely with 

component of COPD with Sepsis and ongoing aspiration. Patient was started on 

recurrent trials of  broad spectrum antibiotics including Cefepime and steroids 

without improvement of functional status. He also had ongoing aspiration with 

or without feeds status without improvement. Long discussion with Health care 

proxy. Discussed hospital course with no improvement in symptoms with steroids, 

antibiotic trials, on going aspiration and also failed tube feeds.  Explained 

to HCP prognosis was poor. Patient is DNR/DNI. HCP and patient both agreed for 

no agressive and comfort care. They are agreeable for hospice care. 



































Date of Discharge: 01/20/18











Minutes to complete discharge: 35





Discharge Summary


Reason For Visit: SEPSIS,COPD,PNEUMONIA


Current Active Problems





Anemia (Acute)


Aspiration into airway (Acute)


COPD (chronic obstructive pulmonary disease) (Acute)


Chronic steroid use (Acute)


Hypertension (Acute)


Inadequate oral nutritional intake (Acute)


Pneumonia (Acute)


Respiratory failure (Acute)


Sepsis (Acute)








Condition: Poor





- Instructions


Diet, Activity, Other Instructions: 


You will be sent to [facility] for Hospice care. 


Referrals: 


Kostas Reeves MD [Primary Care Provider] - 


Disposition: HOME





- Home Medications


Comprehensive Discharge Medication List: 


Ambulatory Orders





Acetaminophen [Tylenol] 2 tab PO Q6H PRN 12/27/17 


Albuterol 2.5/Ipratropium 0.5 [Duoneb -] 1 amp NEB TID 12/27/17 


Budesonide [Pulmicort 0.5 mg Nebulizer -] 1 neb PO BID 12/27/17 


Ipratropium 0.02% Nebulizer [Atrovent 0.02% Nebulizer -] 1 amp NEB Q6H PRN 12/27 /17 


Menthol/Phenol [Cepastat Lozenge -] 1 each MM QID PRN 12/27/17 


Metoclopramide HCl 10 mg PO TID 12/27/17 


Tramadol HCl [Ultram] 2 mg PO DAILY 12/27/17 


Amino Acids/Protein Hydrolys [Prosource No Carb Liquid Pkt] 30 ml PEG BID@0800,

1730  packet 01/18/18 


Pantoprazole Suspension [Protonix Packets For Oral Suspension -] 40 mg PEG 

DAILY  packet 01/18/18 


Polyethylene Glycol 3350 [Miralax 119 gm Btl -] 17 gm NGT BID  bottle 01/18/18 


Scopolamine Hydrobromide [Transderm-Scop -] 1 patch TD Q3D  patch.td72 01/18/18 











- Discharge Referral


Referred to [facility] No

## 2018-01-20 NOTE — PN
Progress Note (short form)





- Note


Progress Note: 


Breathing non-labored on NC O2.   


No acute events overnight. 








 


 


 Intake & Output











 01/17/18 01/18/18 01/19/18 01/20/18





 23:59 23:59 23:59 23:59


 


Intake Total 715 300 700 100


 


Output Total 200 350 650 400


 


Balance 515 -50 50 -300


 


Weight  159 lb 9.6 oz  








 Last Vital Signs











Temp Pulse Resp BP Pulse Ox


 


 97.9 F   96 H  20   122/76   94 L


 


 01/20/18 10:00  01/20/18 10:00  01/20/18 10:00  01/20/18 10:00  01/19/18 21:00








Active Medications





Acetaminophen (Ofirmev Injection -)  1,000 mg IVPB Q6H PRN


   PRN Reason: FEVER OR PAIN


   Last Admin: 01/15/18 10:11 Dose:  1,000 mg


Acetaminophen (Tylenol -)  650 mg PO Q6H PRN


   PRN Reason: PAIN


   Last Admin: 01/19/18 20:06 Dose:  650 mg


Albuterol Sulfate (Ventolin 0.083% Nebulizer Soln -)  1 amp NEB Q4H PRN


   PRN Reason: SHORT OF BREATH/WHEEZING


   Last Admin: 01/19/18 15:28 Dose:  1 amp


Albuterol/Ipratropium (Duoneb -)  1 amp NEB RQID [facility]


   Last Admin: 01/20/18 08:15 Dose:  1 amp


Amino Acids (Prosource No Carb Liquid Pkt)  30 ml PEG BID@0800,1730 [facility]


   Last Admin: 01/20/18 09:56 Dose:  30 ml


Heparin Sodium (Porcine) (Heparin -)  5,000 unit SQ TID [facility]


   Last Admin: 01/20/18 05:37 Dose:  5,000 unit


Pantoprazole Sodium (Protonix Packets For Oral Suspension -)  40 mg PEG DAILY 

[facility]


   Last Admin: 01/20/18 09:56 Dose:  40 mg


Polyethylene Glycol (Miralax (For Daily Use) -)  17 gm NGT BID [facility]


   Last Admin: 01/20/18 09:56 Dose:  17 gm


Prednisone (Deltasone -)  20 mg PEG DAILY [facility]


   Last Admin: 01/20/18 09:56 Dose:  20 mg


Scopolamine HBr (Transderm-Scop -)  1 patch TD Q3D [facility]


   Last Admin: 01/19/18 13:08 Dose:  1 patch











Gen: NAD  


Heart: RRR


Lung: bilateral rhonchi, no wheeze  


Abd: soft, nontender


Ext: no edema


 





A/P


Acute COPD/Bronchiectasis Exacerbation


Acute on Chronic Hypoxic Respiratory Failure


r/o Pneumonia


Emphysema


+Troponins likely Demand Ischemia


HTN





-  prednisone taper 


-  inhaled bronchodilators standing and PRN


-  O2 to keep SpO2 >90%


-  OOB to chair if possible


-  rehab/physical therapy


-  DVT prophylaxis


-  DNR/DNI 


-  D/C planning 





Dr Miner

## 2018-01-20 NOTE — PN
Physical Exam: 


SUBJECTIVE:  Patient seen and examined.


Events overnight:


Patient not tolerating peg tube flushes. Became more congested. 


When stopped, patient improved as per nurse.





 Patient offers no new complaints. Denies chest pain, dizziness, diarrhea, and 

abdominal pain. 





OBJECTIVE:





 Vital Signs











 Period  Temp  Pulse  Resp  BP Sys/Penaloza  Pulse Ox


 


 Last 24 Hr  97.4 F-98.5 F    18-20  113-127/71-76  94








GENERAL: Laying in bed, in no acute distress


HEAD: Normal with no signs of trauma.


EYES: EOMI, PERRLA


ENT: Dry mucous membranes


LUNGS: course breath sounds throughout, use of accessory muscle


HEART: regular rate and rhythm, no murmurs appreciated


ABDOMEN: +BS, NT, ND, Peg tube: no drainage, erythema.


EXTREMITIES: 2+ pulses, warm, well-perfused, no edema. 


SKIN: Warm, dry, normal turgor, no rashes or lesions noted

















Active Medications











Generic Name Dose Route Start Last Admin





  Trade Name Freq  PRN Reason Stop Dose Admin


 


Acetaminophen  1,000 mg  12/28/17 16:51  01/15/18 10:11





  Ofirmev Injection -  IVPB   1,000 mg





  Q6H PRN   Administration





  FEVER OR PAIN   


 


Acetaminophen  650 mg  01/19/18 19:50  01/20/18 14:06





  Tylenol -  PO   650 mg





  Q6H PRN   Administration





  PAIN   


 


Albuterol Sulfate  1 amp  12/28/17 16:51  01/19/18 15:28





  Ventolin 0.083% Nebulizer Soln -  NEB   1 amp





  Q4H PRN   Administration





  SHORT OF BREATH/WHEEZING   


 


Albuterol/Ipratropium  1 amp  01/09/18 10:22  01/20/18 08:15





  Duoneb -  NEB   1 amp





  RQID LIV   Administration


 


Amino Acids  30 ml  01/13/18 17:30  01/20/18 09:56





  Prosource No Carb Liquid Pkt  PEG   30 ml





  BID@0800,1730 LIV   Administration


 


Heparin Sodium (Porcine)  5,000 unit  01/17/18 22:00  01/20/18 14:06





  Heparin -  SQ   5,000 unit





  TID LIV   Administration


 


Pantoprazole Sodium  40 mg  01/13/18 16:15  01/20/18 09:56





  Protonix Packets For Oral Suspension -  PEG   40 mg





  DAILY LIV   Administration


 


Polyethylene Glycol  17 gm  01/14/18 22:00  01/20/18 09:56





  Miralax (For Daily Use) -  NGT   17 gm





  BID LIV   Administration


 


Prednisone  20 mg  01/18/18 10:00  01/20/18 09:56





  Deltasone -  PEG   20 mg





  DAILY LIV   Administration


 


Scopolamine HBr  1 patch  01/07/18 12:30  01/19/18 13:08





  Transderm-Scop -  TD   1 patch





  Q3D LIV   Administration


 


Silver Sulfadiazine  1 applic  01/21/18 10:00  





  Silvadene -  TP   





  DAILY [facility]   











ASSESSMENT/PLAN:








85yo M with h/o COPD (baseline 3LNC at night), HTN, OA who was found to acute 

hypoxic hypercapnic distress. 





#Acute hypoxic hypercapnic respiratory distress


-2/2 to PNA most likely with component of COPD


-Patient clinically declining


-off antibiotics s/p 8 days cefepime


-Prednisone 20mg; continue to taper


-2 trials with nocturnal tube feeds. Patient noted with increased respiratory 

symptoms. 


-Maintain SpO2 >88-90%


-O2 3L NC


-Pulmonology on board


-Duoneb QID PRN


-Nebs PRN q4h


-Palliative care consulted. 





#Sepsis 2/2 to PNA


-Suspected on-going aspiration due to no improvement in breath sounds


-s/p PEG tube 1/12; 


-stopped tube feeds, not tolerating


-Peg tube flushes


-Aspiration precautions


-Afebrile


-Leukocytosis resolved





#Abdominal Pain


-resolved after BM





#HTN


-Currently controlled


-Continue BP monitoring





#FEN:


No fluids


Will replete if needed


Dysphagia puree diet 





#PPX:


DVT - Heparin SQ TID








Dispo:





Health care proxy and patient in agreement with hospice care. Will transfer to 

[facility] pending bed availability. 











Visit type





- Emergency Visit


Emergency Visit: Yes


ED Registration Date: 12/27/17


Care time: The patient presented to the Emergency Department on the above date 

and was hospitalized for further evaluation of their emergent condition.





- New Patient


This patient is new to me today: No





- Critical Care


Critical Care patient: No

## 2018-01-20 NOTE — PN
Teaching Attending Note


Name of Resident: Radha Ryder





ATTENDING PHYSICIAN STATEMENT


Time of evaluation: 8:45 AM


I saw and evaluated the patient.


I reviewed the resident's note and discussed the case with the resident.


I agree with the resident's findings and plan as documented.








SUBJECTIVE:


Patient seen and examined. breathing unchanged, no new complaints. 





OBJECTIVE:


 Vital Signs











 Period  Temp  Pulse  Resp  BP Sys/Penaloza  Pulse Ox


 


 Last 24 Hr  97.4 F-98.5 F    18-20  113-127/71-76  92-94








 Intake & Output











 01/17/18 01/18/18 01/19/18 01/20/18





 23:59 23:59 23:59 23:59


 


Intake Total 715 300 700 100


 


Output Total 200 350 650 700


 


Balance 515 -50 50 -600


 


Weight  159 lb 9.6 oz  








General: mild tachypnea,unchanged


CVS;S1s2 regular


Chest: coarse rhonchi


Abdomen: soft, NT, PEG in place





 Home Medication List











 Medication  Instructions  Recorded  Confirmed  Type


 


Acetaminophen [Tylenol] 2 tab PO Q6H PRN 12/27/17 12/27/17 History


 


Albuterol 2.5/Ipratropium 0.5 1 amp NEB TID 12/27/17 12/27/17 History





[Duoneb -]    


 


Budesonide [Pulmicort 0.5 mg 1 neb PO BID 12/27/17 12/27/17 History





Nebulizer -]    


 


Ipratropium 0.02% Nebulizer 1 amp NEB Q6H PRN 12/27/17 12/27/17 History





[Atrovent 0.02% Nebulizer -]    


 


Menthol/Phenol [Cepastat Lozenge -] 1 each MM QID PRN 12/27/17 12/27/17 History


 


Metoclopramide HCl 10 mg PO TID 12/27/17 12/27/17 History


 


Tramadol HCl [Ultram] 2 mg PO DAILY 12/27/17 12/27/17 History








 Active Medications











Generic Name Dose Route Start Last Admin





  Trade Name Freq  PRN Reason Stop Dose Admin


 


Acetaminophen  1,000 mg  12/28/17 16:51  01/15/18 10:11





  Ofirmev Injection -  IVPB   1,000 mg





  Q6H PRN   Administration





  FEVER OR PAIN   


 


Acetaminophen  650 mg  01/19/18 19:50  01/20/18 14:06





  Tylenol -  PO   650 mg





  Q6H PRN   Administration





  PAIN   


 


Albuterol Sulfate  1 amp  12/28/17 16:51  01/19/18 15:28





  Ventolin 0.083% Nebulizer Soln -  NEB   1 amp





  Q4H PRN   Administration





  SHORT OF BREATH/WHEEZING   


 


Albuterol/Ipratropium  1 amp  01/09/18 10:22  01/20/18 08:15





  Duoneb -  NEB   1 amp





  RQID LIV   Administration


 


Amino Acids  30 ml  01/13/18 17:30  01/20/18 09:56





  Prosource No Carb Liquid Pkt  PEG   30 ml





  BID@0800,1730 LIV   Administration


 


Heparin Sodium (Porcine)  5,000 unit  01/17/18 22:00  01/20/18 14:06





  Heparin -  SQ   5,000 unit





  TID LIV   Administration


 


Pantoprazole Sodium  40 mg  01/13/18 16:15  01/20/18 09:56





  Protonix Packets For Oral Suspension -  PEG   40 mg





  DAILY LIV   Administration


 


Polyethylene Glycol  17 gm  01/14/18 22:00  01/20/18 09:56





  Miralax (For Daily Use) -  NGT   17 gm





  BID LIV   Administration


 


Prednisone  20 mg  01/18/18 10:00  01/20/18 09:56





  Deltasone -  PEG   20 mg





  DAILY LIV   Administration


 


Scopolamine HBr  1 patch  01/07/18 12:30  01/19/18 13:08





  Transderm-Scop -  TD   1 patch





  Q3D LIV   Administration


 


Silver Sulfadiazine  1 applic  01/21/18 10:00  





  Silvadene -  TP   





  DAILY [facility]   














ASSESSMENT AND PLAN:


86 year old male with a PMHx of HTN, COPD (3L NC at night), Osteoarthritis, 

Dysphagia, PUD presented to the ER with acute hypoxic respiratory distress





-acute hypoxic/hypercapneic respiratory distress, suspect aspiration PNA, acute 

on chronic COPD with mild diastolic HF.


-Sepsis due to aspiration PNA


-Dysphagia


-leucocytosis, suspect ongoing aspriation


-Back pain


-Abdominal pain


-hypokalemia


-Hypophosphatemia


-HTN


-PUD





plan;


s/p 8 days of cefepime, off antibiotics. WBC improved. Suspect ongoing 

aspiration. 


Aspiration precautions (patient no compliant with HOB elevation). 


Abdominal symptoms resolved after BM. No new concerns.


Trial with nocturnal tube feeds x 2, patient noted with increased gurgling and 

respiratory symptoms. 


Dysphagia pureed diet. 


Palliative care consulted. 


Discussed with HCP, goals to focus on comfort and DNH. 


[facility] unable to take patient back. 


Application sent to [facility]. ANticipate d/c if patient patient accepted to 

[facility] and arrangements made.

## 2018-01-21 RX ADMIN — Medication SCH ML: at 10:22

## 2018-01-21 RX ADMIN — HEPARIN SODIUM SCH UNIT: 5000 INJECTION, SOLUTION INTRAVENOUS; SUBCUTANEOUS at 22:30

## 2018-01-21 RX ADMIN — LIDOCAINE SCH PATCH: 50 PATCH TOPICAL at 15:16

## 2018-01-21 RX ADMIN — PREDNISONE SCH MG: 20 TABLET ORAL at 10:22

## 2018-01-21 RX ADMIN — IPRATROPIUM BROMIDE AND ALBUTEROL SULFATE SCH AMP: .5; 3 SOLUTION RESPIRATORY (INHALATION) at 20:50

## 2018-01-21 RX ADMIN — SILVER SULFADIAZINE SCH APPLIC: 10 CREAM TOPICAL at 15:16

## 2018-01-21 RX ADMIN — IPRATROPIUM BROMIDE AND ALBUTEROL SULFATE SCH AMP: .5; 3 SOLUTION RESPIRATORY (INHALATION) at 16:00

## 2018-01-21 RX ADMIN — POLYETHYLENE GLYCOL 3350 SCH GM: 17 POWDER, FOR SOLUTION ORAL at 10:22

## 2018-01-21 RX ADMIN — POLYETHYLENE GLYCOL 3350 SCH GM: 17 POWDER, FOR SOLUTION ORAL at 22:30

## 2018-01-21 RX ADMIN — HEPARIN SODIUM SCH UNIT: 5000 INJECTION, SOLUTION INTRAVENOUS; SUBCUTANEOUS at 06:12

## 2018-01-21 RX ADMIN — PANTOPRAZOLE SODIUM SCH MG: 40 GRANULE, DELAYED RELEASE ORAL at 10:22

## 2018-01-21 RX ADMIN — IPRATROPIUM BROMIDE AND ALBUTEROL SULFATE SCH AMP: .5; 3 SOLUTION RESPIRATORY (INHALATION) at 11:10

## 2018-01-21 RX ADMIN — IPRATROPIUM BROMIDE AND ALBUTEROL SULFATE SCH AMP: .5; 3 SOLUTION RESPIRATORY (INHALATION) at 07:30

## 2018-01-21 RX ADMIN — Medication SCH ML: at 18:10

## 2018-01-21 RX ADMIN — HEPARIN SODIUM SCH UNIT: 5000 INJECTION, SOLUTION INTRAVENOUS; SUBCUTANEOUS at 15:16

## 2018-01-21 NOTE — PN
Teaching Attending Note


Name of Resident: .





ATTENDING PHYSICIAN STATEMENT


Time of evaluation: 8:50 AM





SUBJECTIVE:


Patient seen and examined. breathing overall unchanged, denies back pain 

currently. 





OBJECTIVE:


 Vital Signs











 Period  Temp  Pulse  Resp  BP Sys/Penaloza  Pulse Ox


 


 Last 24 Hr  97.8 F-98.5 F    18-18  120-137/70-76  92








 Intake & Output











 01/18/18 01/19/18 01/20/18 01/21/18





 23:59 23:59 23:59 23:59


 


Intake Total 300 700 190 90


 


Output Total 350 650 900 200


 


Balance -50 50 -710 -110


 


Weight 159 lb 9.6 oz   161 lb








general: lying in bed, mild tachypnea noted


Chest: coarse rhonchi, 


Abdomen: soft, PEG in place, NT, nD


extremities: no edema





 Home Medication List











 Medication  Instructions  Recorded  Confirmed  Type


 


Acetaminophen [Tylenol] 2 tab PO Q6H PRN 12/27/17 12/27/17 History


 


Albuterol 2.5/Ipratropium 0.5 1 amp NEB TID 12/27/17 12/27/17 History





[Duoneb -]    


 


Budesonide [Pulmicort 0.5 mg 1 neb PO BID 12/27/17 12/27/17 History





Nebulizer -]    


 


Ipratropium 0.02% Nebulizer 1 amp NEB Q6H PRN 12/27/17 12/27/17 History





[Atrovent 0.02% Nebulizer -]    


 


Menthol/Phenol [Cepastat Lozenge -] 1 each MM QID PRN 12/27/17 12/27/17 History


 


Metoclopramide HCl 10 mg PO TID 12/27/17 12/27/17 History


 


Tramadol HCl [Ultram] 2 mg PO DAILY 12/27/17 12/27/17 History








 Active Medications











Generic Name Dose Route Start Last Admin





  Trade Name Freq  PRN Reason Stop Dose Admin


 


Acetaminophen  1,000 mg  12/28/17 16:51  01/15/18 10:11





  Ofirmev Injection -  IVPB   1,000 mg





  Q6H PRN   Administration





  FEVER OR PAIN   


 


Acetaminophen  650 mg  01/20/18 20:10  





  Tylenol -  PO   





  Q4H PRN   





  MODERATE PAIN   


 


Albuterol Sulfate  1 amp  12/28/17 16:51  01/19/18 15:28





  Ventolin 0.083% Nebulizer Soln -  NEB   1 amp





  Q4H PRN   Administration





  SHORT OF BREATH/WHEEZING   


 


Albuterol/Ipratropium  1 amp  01/09/18 10:22  01/21/18 11:10





  Duoneb -  NEB   1 amp





  RQID LIV   Administration


 


Amino Acids  30 ml  01/13/18 17:30  01/21/18 10:22





  Prosource No Carb Liquid Pkt  PEG   30 ml





  BID@0800,1730 LIV   Administration


 


Heparin Sodium (Porcine)  5,000 unit  01/17/18 22:00  01/21/18 06:12





  Heparin -  SQ   5,000 unit





  TID LIV   Administration


 


Pantoprazole Sodium  40 mg  01/13/18 16:15  01/21/18 10:22





  Protonix Packets For Oral Suspension -  PEG   40 mg





  DAILY LIV   Administration


 


Polyethylene Glycol  17 gm  01/14/18 22:00  01/21/18 10:22





  Miralax (For Daily Use) -  NGT   17 gm





  BID LIV   Administration


 


Prednisone  20 mg  01/18/18 10:00  01/21/18 10:22





  Deltasone -  PEG   20 mg





  DAILY LIV   Administration


 


Scopolamine HBr  1 patch  01/07/18 12:30  01/19/18 13:08





  Transderm-Scop -  TD   1 patch





  Q3D LIV   Administration


 


Silver Sulfadiazine  1 applic  01/21/18 10:00  





  Silvadene -  TP   





  DAILY [facility]   








 Microbiology





12/27/17 09:15   Blood - Peripheral Venous   Blood Culture - Final


                            NO GROWTH AFTER 5 DAYS INCUBATION


12/27/17 09:15   Blood - Peripheral Venous   Blood Culture - Final


                            NO GROWTH AFTER 5 DAYS INCUBATION


12/27/17 22:52   Urine - Urine Clean Catch   Urine Culture - Final


                            NO GROWTH OBTAINED


12/28/17 18:15   Urine For Antigen Detection   Legionella Antigen - Final


12/28/17 18:15   Urine For Antigen Detection   Streptococcus pneumoniae Antigen 

(M - Final


12/27/17 17:30   Nasopharyngeal Swab   Influenza Types A,B Antigen ([facility]) - Final


12/27/17 17:30   Nasopharyngeal Swab    - Final











ASSESSMENT AND PLAN:


86 year old male with a PMHx of HTN, COPD (3L NC at night), Osteoarthritis, 

Dysphagia, PUD presented to the ER with acute hypoxic respiratory distress





-acute hypoxic/hypercapneic respiratory distress, suspect aspiration PNA, acute 

on chronic COPD with mild diastolic HF.


-Sepsis due to aspiration PNA


-Dysphagia


-leucocytosis, suspect ongoing aspriation


-Chronic Back pain


-Abdominal pain


-hypokalemia


-Hypophosphatemia


-HTN


-PUD





plan;


s/p 8 days of cefepime, off antibiotics. WBC improved. Suspect ongoing 

aspiration. 


Aspiration precautions (patient no compliant with HOB elevation). 


Abdominal symptoms resolved after BM. No new concerns.


Tylenol prn for back pain, add lidocaine patch. 


Trial with nocturnal tube feeds x 2, patient noted with increased gurgling and 

respiratory symptoms. 


Dysphagia pureed diet. 


Palliative care consulted. 


Discussed with HCP, goals to focus on comfort and DNH. 


[facility] unable to take patient back. 


Application sent to [facility]. Anticipate d/c if patient patient accepted to 

[place] and arrangements made.

## 2018-01-21 NOTE — PN
Progress Note (short form)





- Note


Progress Note: 


Breathing non-labored on NC O2.   


No acute events overnight. 








 


 


 Intake & Output











 01/18/18 01/19/18 01/20/18 01/21/18





 23:59 23:59 23:59 23:59


 


Intake Total 300 700 190 90


 


Output Total 350 650 900 200


 


Balance -50 50 -710 -110


 


Weight 159 lb 9.6 oz   161 lb








 Last Vital Signs











Temp Pulse Resp BP Pulse Ox


 


 97.8 F   87   18   123/70   92 L


 


 01/21/18 09:15  01/21/18 09:15  01/21/18 09:15  01/21/18 09:15  01/20/18 21:00








Active Medications





Acetaminophen (Ofirmev Injection -)  1,000 mg IVPB Q6H PRN


   PRN Reason: FEVER OR PAIN


   Last Admin: 01/15/18 10:11 Dose:  1,000 mg


Acetaminophen (Tylenol -)  650 mg PO Q4H PRN


   PRN Reason: MODERATE PAIN


Albuterol Sulfate (Ventolin 0.083% Nebulizer Soln -)  1 amp NEB Q4H PRN


   PRN Reason: SHORT OF BREATH/WHEEZING


   Last Admin: 01/19/18 15:28 Dose:  1 amp


Albuterol/Ipratropium (Duoneb -)  1 amp NEB RQID [facility]


   Last Admin: 01/21/18 07:30 Dose:  1 amp


Amino Acids (Prosource No Carb Liquid Pkt)  30 ml PEG BID@0800,1730 [facility]


   Last Admin: 01/21/18 10:22 Dose:  30 ml


Heparin Sodium (Porcine) (Heparin -)  5,000 unit SQ TID [facility]


   Last Admin: 01/21/18 06:12 Dose:  5,000 unit


Pantoprazole Sodium (Protonix Packets For Oral Suspension -)  40 mg PEG DAILY 

[facility]


   Last Admin: 01/21/18 10:22 Dose:  40 mg


Polyethylene Glycol (Miralax (For Daily Use) -)  17 gm NGT BID [facility]


   Last Admin: 01/21/18 10:22 Dose:  17 gm


Prednisone (Deltasone -)  20 mg PEG DAILY [facility]


   Last Admin: 01/21/18 10:22 Dose:  20 mg


Scopolamine HBr (Transderm-Scop -)  1 patch TD Q3D [facility]


   Last Admin: 01/19/18 13:08 Dose:  1 patch


Silver Sulfadiazine (Silvadene -)  1 applic TP DAILY [facility]














Gen: NAD  


Heart: RRR


Lung: bilateral rhonchi, no wheeze  


Abd: soft, nontender


Ext: no edema


 





A/P


Acute COPD/Bronchiectasis Exacerbation


Acute on Chronic Hypoxic Respiratory Failure


r/o Pneumonia


Emphysema


+Troponins likely Demand Ischemia


HTN





-  prednisone taper 


-  inhaled bronchodilators standing and PRN


-  O2 to keep SpO2 >90%


-  OOB to chair if possible


-  rehab/physical therapy


-  DVT prophylaxis


-  DNR/DNI 


-  D/C planning 





Dr Miner

## 2018-01-22 VITALS — DIASTOLIC BLOOD PRESSURE: 81 MMHG | SYSTOLIC BLOOD PRESSURE: 137 MMHG | HEART RATE: 81 BPM | TEMPERATURE: 97.4 F

## 2018-01-22 RX ADMIN — PANTOPRAZOLE SODIUM SCH MG: 40 GRANULE, DELAYED RELEASE ORAL at 11:40

## 2018-01-22 RX ADMIN — LIDOCAINE SCH PATCH: 50 PATCH TOPICAL at 11:40

## 2018-01-22 RX ADMIN — Medication SCH ML: at 08:28

## 2018-01-22 RX ADMIN — SILVER SULFADIAZINE SCH APPLIC: 10 CREAM TOPICAL at 11:41

## 2018-01-22 RX ADMIN — IPRATROPIUM BROMIDE AND ALBUTEROL SULFATE SCH AMP: .5; 3 SOLUTION RESPIRATORY (INHALATION) at 07:30

## 2018-01-22 RX ADMIN — SCOPALAMINE SCH PATCH: 1 PATCH, EXTENDED RELEASE TRANSDERMAL at 12:10

## 2018-01-22 RX ADMIN — POLYETHYLENE GLYCOL 3350 SCH GM: 17 POWDER, FOR SOLUTION ORAL at 11:40

## 2018-01-22 RX ADMIN — PREDNISONE SCH MG: 20 TABLET ORAL at 11:40

## 2018-01-22 RX ADMIN — HEPARIN SODIUM SCH UNIT: 5000 INJECTION, SOLUTION INTRAVENOUS; SUBCUTANEOUS at 05:30

## 2018-01-22 NOTE — DS
Physical Exam: 


SUBJECTIVE:  Patient seen and examined. 


Overnight events:  Patient more congested and has worsening dyspnea, as per 

Nurse.


Patient offers no new complaints. Denies chest pain, abdominal pain, dizziness, 

nausea, vomiting, diarrhea. 








OBJECTIVE:





 Vital Signs











 Period  Temp  Pulse  Resp  BP Sys/Penaloza  Pulse Ox


 


 Last 24 Hr  97.4 F-98.2 F  70-81  18-20  121-137/66-81  91-92








PHYSICAL EXAM





GENERAL: On 5 L NC, Laying in bed, in no acute distress


HEAD: Normal with no signs of trauma.


EYES: EOMI, PERRLA


ENT: Dry mucous membranes


LUNGS: course breath sounds throughout (worse today)> on right side, use of 

accessory muscle


HEART: regular rate and rhythm, no murmurs appreciated


ABDOMEN: +BS, NT, ND, Peg tube: no drainage, erythema.


EXTREMITIES: 2+ pulses, warm, well-perfused, no edema. 


SKIN: Warm, dry, normal turgor, no rashes or lesions noted








LABS





HOSPITAL COURSE:





Date of Admission:12/27/17





87yo M with h/o COPD , HTN, OA who was found to acute hypoxic hypercapnic 

distress secondary to PNA and and most likely with component of COPD with 

Sepsis and ongoing aspiration. Patient was started on recurrent trials of  

broad spectrum antibiotics including Cefepime and steroids without improvement 

of functional status. He also had ongoing aspiration with or without feeds 

status without improvement. Long discussion with Health care proxy. Discussed 

hospital course with no improvement in symptoms with steroids, antibiotic trials

, on going aspiration and also failed tube feeds.  Explained to HCP prognosis 

was poor. Patient is DNR/DNI. HCP and patient both agreed for no agressive and 

comfort care. They are agreeable for hospice care. 





























Date of Discharge: 01/22/18














Discharge Summary


Reason For Visit: SEPSIS,COPD,PNEUMONIA


Condition: Poor





- Instructions


Diet, Activity, Other Instructions: 


You will be sent to [facility] for Hospice care. 


Disposition: TRANSFER ACUTE CARE/OTHER HOSP





- Home Medications


Comprehensive Discharge Medication List: 


Ambulatory Orders





Acetaminophen [Tylenol] 2 tab PO Q6H PRN 12/27/17 


Albuterol 2.5/Ipratropium 0.5 [Duoneb -] 1 amp NEB TID 12/27/17 


Budesonide [Pulmicort 0.5 mg Nebulizer -] 1 neb PO BID 12/27/17 


Ipratropium 0.02% Nebulizer [Atrovent 0.02% Nebulizer -] 1 amp NEB Q6H PRN 12/27 /17 


Menthol/Phenol [Cepastat Lozenge -] 1 each MM QID PRN 12/27/17 


Metoclopramide HCl 10 mg PO TID 12/27/17 


Tramadol HCl [Ultram] 2 mg PO DAILY 12/27/17 


Amino Acids/Protein Hydrolys [Prosource No Carb Liquid Pkt] 30 ml PEG BID@0800,

1730  packet 01/18/18 


Pantoprazole Suspension [Protonix Packets For Oral Suspension -] 40 mg PEG 

DAILY  packet 01/18/18 


Polyethylene Glycol 3350 [Miralax 119 gm Btl -] 17 gm NGT BID  bottle 01/18/18 


Scopolamine Hydrobromide [Transderm-Scop -] 1 patch TD Q3D  patch.td72 01/18/18 


Lidocaine 5% Patch [Lidoderm -] 1 patch TP DAILY  patch 01/22/18 


Lidocaine Patch Removal [Lidoderm Patch Removal] 1 each MC DAILY@2200  each 01/ 22/18 


Prednisone [Deltasone -] 10 mg PEG DAILY 3 Days #3 tablet 01/22/18 











- Discharge Referral


Referred to [facility] No

## 2018-01-22 NOTE — PN
Teaching Attending Note


Name of Resident: Radha Ryder





ATTENDING PHYSICIAN STATEMENT


Time of evaluation: 


I saw and evaluated the patient.


I reviewed the resident's note and discussed the case with the resident.


I agree with the resident's findings and plan as documented.








SUBJECTIVE:


Patient seen and examined. Overall breathing the same. Chronic back pain but 

currently denies. 





OBJECTIVE:


 Vital Signs











 Period  Temp  Pulse  Resp  BP Sys/Penaloza  Pulse Ox


 


 Last 24 Hr  97.6 F-98.2 F  70-87  18-20  121-123/66-79  91-93








 Intake & Output











 01/19/18 01/20/18 01/21/18 01/22/18





 23:59 23:59 23:59 23:59


 


Intake Total 700 190 150 120


 


Output Total  200


 


Balance 50 -710 -850 -80


 


Weight   161 lb 162 lb








general: lying in bed, mild tachypnea, 


Chest: bilateral coarse rales, R>L 


abdomen: soft, pEG in place, NT





 Home Medication List











 Medication  Instructions  Recorded  Confirmed  Type


 


Acetaminophen [Tylenol] 2 tab PO Q6H PRN 12/27/17 12/27/17 History


 


Albuterol 2.5/Ipratropium 0.5 1 amp NEB TID 12/27/17 12/27/17 History





[Duoneb -]    


 


Budesonide [Pulmicort 0.5 mg 1 neb PO BID 12/27/17 12/27/17 History





Nebulizer -]    


 


Ipratropium 0.02% Nebulizer 1 amp NEB Q6H PRN 12/27/17 12/27/17 History





[Atrovent 0.02% Nebulizer -]    


 


Menthol/Phenol [Cepastat Lozenge -] 1 each MM QID PRN 12/27/17 12/27/17 History


 


Metoclopramide HCl 10 mg PO TID 12/27/17 12/27/17 History


 


Tramadol HCl [Ultram] 2 mg PO DAILY 12/27/17 12/27/17 History








 Microbiology





12/27/17 09:15   Blood - Peripheral Venous   Blood Culture - Final


                            NO GROWTH AFTER 5 DAYS INCUBATION


12/27/17 09:15   Blood - Peripheral Venous   Blood Culture - Final


                            NO GROWTH AFTER 5 DAYS INCUBATION


12/27/17 22:52   Urine - Urine Clean Catch   Urine Culture - Final


                            NO GROWTH OBTAINED


12/28/17 18:15   Urine For Antigen Detection   Legionella Antigen - Final


12/28/17 18:15   Urine For Antigen Detection   Streptococcus pneumoniae Antigen 

(M - Final


12/27/17 17:30   Nasopharyngeal Swab   Influenza Types A,B Antigen (JAISON) - Final


12/27/17 17:30   Nasopharyngeal Swab    - Final











ASSESSMENT AND PLAN:


86 year old male with a PMHx of HTN, COPD (3L NC at night), Osteoarthritis, 

Dysphagia, PUD presented to the ER with acute hypoxic respiratory distress





-acute hypoxic/hypercapneic respiratory distress, suspect aspiration PNA, acute 

on chronic COPD with mild diastolic HF.


-Sepsis due to aspiration PNA


-Dysphagia


-leucocytosis, suspect ongoing aspriation


-Chronic Back pain


-Abdominal pain


-hypokalemia


-Hypophosphatemia


-HTN


-PUD





plan;


s/p 8 days of cefepime, off antibiotics. WBC improved. Suspect ongoing 

aspiration. 


Aspiration precautions (patient no compliant with HOB elevation). 


Abdominal symptoms resolved after BM. No new concerns.


Tylenol prn for back pain, add lidocaine patch. 


Trial with nocturnal tube feeds x 2, patient noted with increased gurgling and 

respiratory symptoms. 


Dysphagia pureed diet. 


Palliative care consulted. 


Discussed with HCP, goals to focus on comfort and DNH. 


[facility] unable to take patient back. 


Bed available at [place], d/c today.

## 2018-01-22 NOTE — PN
Progress Note (short form)





- Note


Progress Note: 





PULMONARY





States breathing is relatively comfortable today. +occasional cough, wheezing.





 Last Vital Signs











Temp Pulse Resp BP Pulse Ox


 


 97.4 F L  81   18   137/81   92 L


 


 01/22/18 09:00  01/22/18 09:00  01/22/18 09:00  01/22/18 09:00  01/22/18 09:00











Gen:  tachypneic with minimal movement


Heart: RRR


Lung: bilateral rhonchi 


Abd: soft, nontender


Ext: no edema





 CBC, BMP





 01/19/18 06:30 





 01/19/18 06:30 





Active Medications





Acetaminophen (Ofirmev Injection -)  1,000 mg IVPB Q6H PRN


   PRN Reason: FEVER OR PAIN


   Last Admin: 01/15/18 10:11 Dose:  1,000 mg


Acetaminophen (Tylenol -)  650 mg PO Q4H PRN


   PRN Reason: MODERATE PAIN


   Last Admin: 01/21/18 22:30 Dose:  650 mg


Albuterol Sulfate (Ventolin 0.083% Nebulizer Soln -)  1 amp NEB Q4H PRN


   PRN Reason: SHORT OF BREATH/WHEEZING


   Last Admin: 01/19/18 15:28 Dose:  1 amp


Albuterol/Ipratropium (Duoneb -)  1 amp NEB RQID [facility]


   Last Admin: 01/22/18 07:30 Dose:  1 amp


Amino Acids (Prosource No Carb Liquid Pkt)  30 ml PEG BID@0800,1730 [facility]


   Last Admin: 01/22/18 08:28 Dose:  30 ml


Heparin Sodium (Porcine) (Heparin -)  5,000 unit SQ TID [facility]


   Last Admin: 01/22/18 05:30 Dose:  5,000 unit


Lidocaine (Lidoderm Patch -)  1 patch TP DAILY [facility]


   Last Admin: 01/21/18 15:16 Dose:  1 patch


Miscellaneous (Lidoderm Patch Removal)  1 each MC DAILY@2200 [facility]


   Last Admin: 01/21/18 22:30 Dose:  1 each


Pantoprazole Sodium (Protonix Packets For Oral Suspension -)  40 mg PEG DAILY 

[facility]


   Last Admin: 01/21/18 10:22 Dose:  40 mg


Polyethylene Glycol (Miralax (For Daily Use) -)  17 gm NGT BID [facility]


   Last Admin: 01/21/18 22:30 Dose:  17 gm


Prednisone (Deltasone -)  20 mg PEG DAILY [facility]


   Last Admin: 01/21/18 10:22 Dose:  20 mg


Scopolamine HBr (Transderm-Scop -)  1 patch TD Q3D [facility]


   Last Admin: 01/19/18 13:08 Dose:  1 patch


Silver Sulfadiazine (Silvadene -)  1 applic TP DAILY [facility]


   Last Admin: 01/21/18 15:16 Dose:  1 applic








A/P


Acute COPD/Bronchiectasis Exacerbation


Acute on Chronic Hypoxic Respiratory Failure


r/o Pneumonia


Emphysema


+Troponins likely Demand Ischemia


HTN





-  continue prednisone at current dose 


-  inhaled bronchodilators standing and PRN


-  O2 to keep SpO2 >90%


-  OOB to chair if possible


-  enteral feeds


-  rehab/physical therapy


-  DVT prophylaxis


-  for [place] placement

## 2024-12-02 NOTE — PN
Progress Note, SLP





- Note


Progress Note: 





Pt now on pureed diet PO. He should not be receiving PO liquids, and per 

nursing he is not. He is not receiving hydration or nutrition though PEG, with 

concern of aspiration, per nursing. Pt seen reclining. He refuses to main HOB 

elevated and lowers the HOB. With HOB rclined, risk of aspiration will be 

increased. Reviewed with Nursing and GI.


 Selected Entries











  01/14/18 01/14/18 01/14/18





  06:03 10:00 15:30


 


Temperature 97.3 F L 98.2 F 98.2 F














  01/14/18 01/14/18 01/15/18





  18:06 22:00 02:03


 


Temperature 97.7 F 97.5 F L 97.5 F L














  01/15/18





  06:51


 


Temperature 98.3 F








 Laboratory Tests











  01/12/18 01/14/18 01/15/18





  06:35 12:35 06:20


 


WBC  11.4 H  18.1 H D  12.4 H D











Consider slow rate of hydration/possibly some supplemental nutrition 

nocturnally via PEG, with HOB elevated, as tolerated.


Puree/no liquids PO. Supervision with CNA reminding pt of multiple swallows per 

bite to clear pharynx. No